# Patient Record
Sex: FEMALE | Race: WHITE | NOT HISPANIC OR LATINO | URBAN - NONMETROPOLITAN AREA
[De-identification: names, ages, dates, MRNs, and addresses within clinical notes are randomized per-mention and may not be internally consistent; named-entity substitution may affect disease eponyms.]

---
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
## 2019-01-07 ENCOUNTER — LAB REQUISITION (OUTPATIENT)
Dept: LAB | Facility: HOSPITAL | Age: 19
End: 2019-01-07

## 2019-01-07 DIAGNOSIS — G40.89 OTHER SEIZURES (HCC): ICD-10-CM

## 2019-01-07 LAB — PHENYTOIN SERPL-MCNC: 16.4 MCG/ML (ref 10–20)

## 2019-01-07 PROCEDURE — 80185 ASSAY OF PHENYTOIN TOTAL: CPT | Performed by: FAMILY MEDICINE

## 2019-02-26 ENCOUNTER — APPOINTMENT (OUTPATIENT)
Dept: CT IMAGING | Facility: HOSPITAL | Age: 19
End: 2019-02-26

## 2019-02-26 ENCOUNTER — APPOINTMENT (OUTPATIENT)
Dept: GENERAL RADIOLOGY | Facility: HOSPITAL | Age: 19
End: 2019-02-26

## 2019-02-26 ENCOUNTER — HOSPITAL ENCOUNTER (INPATIENT)
Facility: HOSPITAL | Age: 19
LOS: 9 days | Discharge: ANOTHER HOSPITAL | End: 2019-03-07
Attending: FAMILY MEDICINE | Admitting: INTERNAL MEDICINE

## 2019-02-26 DIAGNOSIS — J96.01 ACUTE RESPIRATORY FAILURE WITH HYPOXIA (HCC): Primary | ICD-10-CM

## 2019-02-26 DIAGNOSIS — N30.00 ACUTE CYSTITIS WITHOUT HEMATURIA: ICD-10-CM

## 2019-02-26 DIAGNOSIS — J18.9 PNEUMONIA OF BOTH LUNGS DUE TO INFECTIOUS ORGANISM, UNSPECIFIED PART OF LUNG: ICD-10-CM

## 2019-02-26 DIAGNOSIS — A41.9 SEPSIS, DUE TO UNSPECIFIED ORGANISM: ICD-10-CM

## 2019-02-26 DIAGNOSIS — I95.9 HYPOTENSION, UNSPECIFIED HYPOTENSION TYPE: ICD-10-CM

## 2019-02-26 LAB
A-A DO2: >300 MMHG (ref 0–300)
A-A DO2: >300 MMHG (ref 0–300)
ALBUMIN SERPL-MCNC: 3.5 G/DL (ref 3.5–5)
ALBUMIN/GLOB SERPL: 2.3 G/DL (ref 1.5–2.5)
ALP SERPL-CCNC: 196 U/L (ref 35–104)
ALT SERPL W P-5'-P-CCNC: 4 U/L (ref 10–36)
ANION GAP SERPL CALCULATED.3IONS-SCNC: 14.4 MMOL/L (ref 3.6–11.2)
ARTERIAL PATENCY WRIST A: ABNORMAL
ARTERIAL PATENCY WRIST A: ABNORMAL
AST SERPL-CCNC: 88 U/L (ref 10–30)
ATMOSPHERIC PRESS: 735 MMHG
ATMOSPHERIC PRESS: 737 MMHG
BACTERIA UR QL AUTO: ABNORMAL /HPF
BASE EXCESS BLDA CALC-SCNC: -2.2 MMOL/L
BASE EXCESS BLDA CALC-SCNC: -6 MMOL/L
BASOPHILS # BLD AUTO: 0.04 10*3/MM3 (ref 0–0.3)
BASOPHILS NFR BLD AUTO: 0.3 % (ref 0–2)
BDY SITE: ABNORMAL
BDY SITE: ABNORMAL
BILIRUB SERPL-MCNC: 0.1 MG/DL (ref 0.2–1.8)
BILIRUB UR QL STRIP: ABNORMAL
BNP SERPL-MCNC: 5 PG/ML (ref 0–100)
BODY TEMPERATURE: 98.6 C
BODY TEMPERATURE: 98.6 C
BUN BLD-MCNC: 32 MG/DL (ref 7–21)
BUN/CREAT SERPL: 34 (ref 7–25)
CALCIUM SPEC-SCNC: 8.3 MG/DL (ref 7.7–10)
CHLORIDE SERPL-SCNC: 92 MMOL/L (ref 99–112)
CK SERPL-CCNC: 37 U/L (ref 24–173)
CLARITY UR: ABNORMAL
CO2 SERPL-SCNC: 22.6 MMOL/L (ref 24.3–31.9)
COHGB MFR BLD: 0.6 % (ref 0–5)
COHGB MFR BLD: 1 % (ref 0–5)
COLOR UR: ABNORMAL
CREAT BLD-MCNC: 0.94 MG/DL (ref 0.43–1.29)
CREAT UR-MCNC: 83.6 MG/DL
CRP SERPL-MCNC: 24.9 MG/DL (ref 0–0.99)
D-LACTATE SERPL-SCNC: 0.6 MMOL/L (ref 0.5–2)
DACRYOCYTES BLD QL SMEAR: NORMAL
DEPRECATED RDW RBC AUTO: 44.1 FL (ref 37–54)
EOSINOPHIL # BLD AUTO: 0.01 10*3/MM3 (ref 0–0.7)
EOSINOPHIL NFR BLD AUTO: 0.1 % (ref 0–5)
ERYTHROCYTE [DISTWIDTH] IN BLOOD BY AUTOMATED COUNT: 12.7 % (ref 11.5–14.5)
FLUAV AG NPH QL: POSITIVE
FLUBV AG NPH QL IA: NEGATIVE
GFR SERPL CREATININE-BSD FRML MDRD: 76 ML/MIN/1.73
GLOBULIN UR ELPH-MCNC: 1.5 GM/DL
GLUCOSE BLD-MCNC: 152 MG/DL (ref 70–110)
GLUCOSE UR STRIP-MCNC: NEGATIVE MG/DL
HCO3 BLDA-SCNC: 19.9 MMOL/L (ref 22–26)
HCO3 BLDA-SCNC: 22.4 MMOL/L (ref 22–26)
HCT VFR BLD AUTO: 43.7 % (ref 37–47)
HCT VFR BLD CALC: 41 % (ref 37–47)
HCT VFR BLD CALC: 43 % (ref 37–47)
HGB BLD-MCNC: 15.4 G/DL (ref 12–16)
HGB BLDA-MCNC: 14 G/DL (ref 12–16)
HGB BLDA-MCNC: 14.6 G/DL (ref 12–16)
HGB UR QL STRIP.AUTO: ABNORMAL
HOROWITZ INDEX BLD+IHG-RTO: 100 %
HOROWITZ INDEX BLD+IHG-RTO: 100 %
HYALINE CASTS UR QL AUTO: ABNORMAL /LPF
IMM GRANULOCYTES # BLD AUTO: 0.2 10*3/MM3 (ref 0–0.03)
IMM GRANULOCYTES NFR BLD AUTO: 1.3 % (ref 0–0.5)
KETONES UR QL STRIP: ABNORMAL
LARGE PLATELETS: NORMAL
LEUKOCYTE ESTERASE UR QL STRIP.AUTO: ABNORMAL
LYMPHOCYTES # BLD AUTO: 1.66 10*3/MM3 (ref 1–3)
LYMPHOCYTES NFR BLD AUTO: 10.6 % (ref 21–51)
MACROCYTES BLD QL SMEAR: NORMAL
MAGNESIUM SERPL-MCNC: 2.5 MG/DL (ref 1.6–2.2)
MCH RBC QN AUTO: 34.1 PG (ref 27–33)
MCHC RBC AUTO-ENTMCNC: 35.2 G/DL (ref 33–37)
MCV RBC AUTO: 96.7 FL (ref 80–94)
METHGB BLD QL: 0.4 % (ref 0–3)
METHGB BLD QL: 0.5 % (ref 0–3)
MODALITY: ABNORMAL
MODALITY: ABNORMAL
MONOCYTES # BLD AUTO: 3.88 10*3/MM3 (ref 0.1–0.9)
MONOCYTES NFR BLD AUTO: 24.8 % (ref 0–10)
MRSA DNA SPEC QL NAA+PROBE: NEGATIVE
NEUTROPHILS # BLD AUTO: 9.86 10*3/MM3 (ref 1.4–6.5)
NEUTROPHILS NFR BLD AUTO: 62.9 % (ref 30–70)
NITRITE UR QL STRIP: NEGATIVE
NRBC BLD AUTO-RTO: 0 /100 WBC (ref 0–0)
OSMOLALITY SERPL CALC.SUM OF ELEC: 268.8 MOSM/KG (ref 273–305)
OXYHGB MFR BLDV: 92.8 % (ref 85–100)
OXYHGB MFR BLDV: 97.3 % (ref 85–100)
PCO2 BLDA: 38.2 MM HG (ref 35–45)
PCO2 BLDA: 40.9 MM HG (ref 35–45)
PEEP RESPIRATORY: 3 CM[H2O]
PEEP RESPIRATORY: 3 CM[H2O]
PH BLDA: 7.31 PH UNITS (ref 7.35–7.45)
PH BLDA: 7.39 PH UNITS (ref 7.35–7.45)
PH UR STRIP.AUTO: <=5 [PH] (ref 5–8)
PHENYTOIN SERPL-MCNC: 27.4 MCG/ML (ref 10–20)
PLATELET # BLD AUTO: 192 10*3/MM3 (ref 130–400)
PMV BLD AUTO: 13.1 FL (ref 6–10)
PO2 BLDA: 128.4 MM HG (ref 80–100)
PO2 BLDA: 74.2 MM HG (ref 80–100)
POTASSIUM BLD-SCNC: 4.5 MMOL/L (ref 3.5–5.3)
PROT SERPL-MCNC: 5 G/DL (ref 6–8)
PROT UR QL STRIP: ABNORMAL
RBC # BLD AUTO: 4.52 10*6/MM3 (ref 4.2–5.4)
RBC # UR: ABNORMAL /HPF
REF LAB TEST METHOD: ABNORMAL
S AUREUS DNA SPEC QL NAA+PROBE: NEGATIVE
SAO2 % BLDCOA: 94.1 % (ref 90–100)
SAO2 % BLDCOA: 98.4 % (ref 90–100)
SET MECH RESP RATE: 18
SET MECH RESP RATE: 20
SODIUM BLD-SCNC: 129 MMOL/L (ref 135–153)
SODIUM UR-SCNC: <10 MMOL/L
SP GR UR STRIP: >=1.03 (ref 1–1.03)
SQUAMOUS #/AREA URNS HPF: ABNORMAL /HPF
T3FREE SERPL-MCNC: 1.8 PG/ML (ref 2.3–4.2)
T4 FREE SERPL-MCNC: 0.66 NG/DL (ref 0.89–1.76)
TROPONIN I SERPL-MCNC: 0.01 NG/ML
TSH SERPL DL<=0.05 MIU/L-ACNC: 0.43 MIU/ML (ref 0.55–4.78)
UROBILINOGEN UR QL STRIP: ABNORMAL
VENTILATOR MODE: ABNORMAL
VENTILATOR MODE: ABNORMAL
VT ON VENT VENT: 350 ML
VT ON VENT VENT: <300 ML
WBC NRBC COR # BLD: 15.65 10*3/MM3 (ref 4.5–12.5)
WBC UR QL AUTO: ABNORMAL /HPF

## 2019-02-26 PROCEDURE — 87147 CULTURE TYPE IMMUNOLOGIC: CPT | Performed by: FAMILY MEDICINE

## 2019-02-26 PROCEDURE — 83605 ASSAY OF LACTIC ACID: CPT | Performed by: FAMILY MEDICINE

## 2019-02-26 PROCEDURE — 94799 UNLISTED PULMONARY SVC/PX: CPT

## 2019-02-26 PROCEDURE — 82805 BLOOD GASES W/O2 SATURATION: CPT | Performed by: NURSE PRACTITIONER

## 2019-02-26 PROCEDURE — 84439 ASSAY OF FREE THYROXINE: CPT | Performed by: NURSE PRACTITIONER

## 2019-02-26 PROCEDURE — 85007 BL SMEAR W/DIFF WBC COUNT: CPT | Performed by: FAMILY MEDICINE

## 2019-02-26 PROCEDURE — 25010000002 MEROPENEM: Performed by: FAMILY MEDICINE

## 2019-02-26 PROCEDURE — 31500 INSERT EMERGENCY AIRWAY: CPT

## 2019-02-26 PROCEDURE — 81001 URINALYSIS AUTO W/SCOPE: CPT | Performed by: FAMILY MEDICINE

## 2019-02-26 PROCEDURE — 36600 WITHDRAWAL OF ARTERIAL BLOOD: CPT | Performed by: FAMILY MEDICINE

## 2019-02-26 PROCEDURE — 82550 ASSAY OF CK (CPK): CPT | Performed by: FAMILY MEDICINE

## 2019-02-26 PROCEDURE — 84443 ASSAY THYROID STIM HORMONE: CPT | Performed by: FAMILY MEDICINE

## 2019-02-26 PROCEDURE — 84300 ASSAY OF URINE SODIUM: CPT | Performed by: NURSE PRACTITIONER

## 2019-02-26 PROCEDURE — 87640 STAPH A DNA AMP PROBE: CPT | Performed by: INTERNAL MEDICINE

## 2019-02-26 PROCEDURE — 80053 COMPREHEN METABOLIC PANEL: CPT | Performed by: FAMILY MEDICINE

## 2019-02-26 PROCEDURE — 83880 ASSAY OF NATRIURETIC PEPTIDE: CPT | Performed by: FAMILY MEDICINE

## 2019-02-26 PROCEDURE — 0BH17EZ INSERTION OF ENDOTRACHEAL AIRWAY INTO TRACHEA, VIA NATURAL OR ARTIFICIAL OPENING: ICD-10-PCS | Performed by: FAMILY MEDICINE

## 2019-02-26 PROCEDURE — 87186 SC STD MICRODIL/AGAR DIL: CPT | Performed by: FAMILY MEDICINE

## 2019-02-26 PROCEDURE — 5A1955Z RESPIRATORY VENTILATION, GREATER THAN 96 CONSECUTIVE HOURS: ICD-10-PCS | Performed by: HOSPITALIST

## 2019-02-26 PROCEDURE — 93010 ELECTROCARDIOGRAM REPORT: CPT | Performed by: INTERNAL MEDICINE

## 2019-02-26 PROCEDURE — 83050 HGB METHEMOGLOBIN QUAN: CPT | Performed by: FAMILY MEDICINE

## 2019-02-26 PROCEDURE — 99291 CRITICAL CARE FIRST HOUR: CPT | Performed by: INTERNAL MEDICINE

## 2019-02-26 PROCEDURE — 84481 FREE ASSAY (FT-3): CPT | Performed by: NURSE PRACTITIONER

## 2019-02-26 PROCEDURE — 25010000002 MIDAZOLAM PER 1 MG

## 2019-02-26 PROCEDURE — 82805 BLOOD GASES W/O2 SATURATION: CPT | Performed by: FAMILY MEDICINE

## 2019-02-26 PROCEDURE — 87150 DNA/RNA AMPLIFIED PROBE: CPT | Performed by: FAMILY MEDICINE

## 2019-02-26 PROCEDURE — 85025 COMPLETE CBC W/AUTO DIFF WBC: CPT | Performed by: FAMILY MEDICINE

## 2019-02-26 PROCEDURE — 83735 ASSAY OF MAGNESIUM: CPT | Performed by: FAMILY MEDICINE

## 2019-02-26 PROCEDURE — 71045 X-RAY EXAM CHEST 1 VIEW: CPT

## 2019-02-26 PROCEDURE — 36600 WITHDRAWAL OF ARTERIAL BLOOD: CPT | Performed by: NURSE PRACTITIONER

## 2019-02-26 PROCEDURE — 82375 ASSAY CARBOXYHB QUANT: CPT | Performed by: FAMILY MEDICINE

## 2019-02-26 PROCEDURE — 83050 HGB METHEMOGLOBIN QUAN: CPT | Performed by: NURSE PRACTITIONER

## 2019-02-26 PROCEDURE — 93005 ELECTROCARDIOGRAM TRACING: CPT | Performed by: FAMILY MEDICINE

## 2019-02-26 PROCEDURE — 82570 ASSAY OF URINE CREATININE: CPT | Performed by: NURSE PRACTITIONER

## 2019-02-26 PROCEDURE — 99285 EMERGENCY DEPT VISIT HI MDM: CPT

## 2019-02-26 PROCEDURE — 82375 ASSAY CARBOXYHB QUANT: CPT | Performed by: NURSE PRACTITIONER

## 2019-02-26 PROCEDURE — 87040 BLOOD CULTURE FOR BACTERIA: CPT | Performed by: FAMILY MEDICINE

## 2019-02-26 PROCEDURE — 87086 URINE CULTURE/COLONY COUNT: CPT | Performed by: FAMILY MEDICINE

## 2019-02-26 PROCEDURE — 25010000002 HEPARIN (PORCINE) PER 1000 UNITS: Performed by: INTERNAL MEDICINE

## 2019-02-26 PROCEDURE — 86140 C-REACTIVE PROTEIN: CPT | Performed by: FAMILY MEDICINE

## 2019-02-26 PROCEDURE — 80185 ASSAY OF PHENYTOIN TOTAL: CPT | Performed by: INTERNAL MEDICINE

## 2019-02-26 PROCEDURE — 87077 CULTURE AEROBIC IDENTIFY: CPT | Performed by: FAMILY MEDICINE

## 2019-02-26 PROCEDURE — 94002 VENT MGMT INPAT INIT DAY: CPT

## 2019-02-26 PROCEDURE — 84484 ASSAY OF TROPONIN QUANT: CPT | Performed by: FAMILY MEDICINE

## 2019-02-26 PROCEDURE — 87804 INFLUENZA ASSAY W/OPTIC: CPT | Performed by: FAMILY MEDICINE

## 2019-02-26 PROCEDURE — 25010000002 LINEZOLID 600 MG/300ML SOLUTION: Performed by: FAMILY MEDICINE

## 2019-02-26 PROCEDURE — 71045 X-RAY EXAM CHEST 1 VIEW: CPT | Performed by: RADIOLOGY

## 2019-02-26 PROCEDURE — 87641 MR-STAPH DNA AMP PROBE: CPT | Performed by: INTERNAL MEDICINE

## 2019-02-26 RX ORDER — MELATONIN
1000 4 TIMES DAILY
COMMUNITY
End: 2019-03-07 | Stop reason: HOSPADM

## 2019-02-26 RX ORDER — LINEZOLID 2 MG/ML
600 INJECTION, SOLUTION INTRAVENOUS ONCE
Status: COMPLETED | OUTPATIENT
Start: 2019-02-26 | End: 2019-02-26

## 2019-02-26 RX ORDER — SODIUM CHLORIDE 9 MG/ML
75 INJECTION, SOLUTION INTRAVENOUS CONTINUOUS
Status: DISCONTINUED | OUTPATIENT
Start: 2019-02-26 | End: 2019-02-27

## 2019-02-26 RX ORDER — BACLOFEN 20 MG/1
30 TABLET ORAL 3 TIMES DAILY
COMMUNITY
End: 2019-03-07 | Stop reason: HOSPADM

## 2019-02-26 RX ORDER — MIDAZOLAM IN 0.9 % SOD.CHLORID 1 MG/ML
PLASTIC BAG, INJECTION (ML) INTRAVENOUS
Status: DISPENSED
Start: 2019-02-26 | End: 2019-02-27

## 2019-02-26 RX ORDER — PANTOPRAZOLE SODIUM 40 MG/10ML
40 INJECTION, POWDER, LYOPHILIZED, FOR SOLUTION INTRAVENOUS
Status: DISCONTINUED | OUTPATIENT
Start: 2019-02-27 | End: 2019-02-27

## 2019-02-26 RX ORDER — PHENYTOIN 50 MG/1
125 TABLET, CHEWABLE ORAL 3 TIMES DAILY
Status: DISCONTINUED | OUTPATIENT
Start: 2019-02-26 | End: 2019-02-26

## 2019-02-26 RX ORDER — LACOSAMIDE 50 MG/1
200 TABLET ORAL 3 TIMES DAILY
Status: DISCONTINUED | OUTPATIENT
Start: 2019-02-26 | End: 2019-03-07 | Stop reason: HOSPADM

## 2019-02-26 RX ORDER — CETIRIZINE HYDROCHLORIDE 10 MG/1
10 TABLET ORAL NIGHTLY
COMMUNITY
End: 2019-03-07 | Stop reason: HOSPADM

## 2019-02-26 RX ORDER — OSELTAMIVIR PHOSPHATE 30 MG/1
30 CAPSULE ORAL EVERY 12 HOURS SCHEDULED
Status: DISCONTINUED | OUTPATIENT
Start: 2019-02-26 | End: 2019-02-27

## 2019-02-26 RX ORDER — VIGABATRIN 500 MG/1
1000 POWDER, FOR SOLUTION ORAL 3 TIMES DAILY
COMMUNITY

## 2019-02-26 RX ORDER — PANTOPRAZOLE SODIUM 40 MG/1
40 TABLET, DELAYED RELEASE ORAL NIGHTLY
Status: DISCONTINUED | OUTPATIENT
Start: 2019-02-26 | End: 2019-02-26

## 2019-02-26 RX ORDER — HEPARIN SODIUM 5000 [USP'U]/ML
5000 INJECTION, SOLUTION INTRAVENOUS; SUBCUTANEOUS EVERY 12 HOURS SCHEDULED
Status: DISCONTINUED | OUTPATIENT
Start: 2019-02-26 | End: 2019-02-27

## 2019-02-26 RX ORDER — CLONAZEPAM 2 MG/1
2 TABLET ORAL 2 TIMES DAILY PRN
COMMUNITY

## 2019-02-26 RX ORDER — TAURINE 500 MG
500 CAPSULE ORAL 2 TIMES DAILY
COMMUNITY
End: 2019-03-07 | Stop reason: HOSPADM

## 2019-02-26 RX ORDER — ACETAMINOPHEN 650 MG/1
SUPPOSITORY RECTAL
Status: COMPLETED
Start: 2019-02-26 | End: 2019-02-26

## 2019-02-26 RX ORDER — POTASSIUM CHLORIDE 20MEQ/15ML
7.5 LIQUID (ML) ORAL DAILY
COMMUNITY

## 2019-02-26 RX ORDER — ACETAMINOPHEN 650 MG/1
650 SUPPOSITORY RECTAL ONCE
Status: COMPLETED | OUTPATIENT
Start: 2019-02-26 | End: 2019-02-26

## 2019-02-26 RX ORDER — SODIUM CHLORIDE 0.9 % (FLUSH) 0.9 %
10 SYRINGE (ML) INJECTION AS NEEDED
Status: DISCONTINUED | OUTPATIENT
Start: 2019-02-26 | End: 2019-03-07 | Stop reason: HOSPADM

## 2019-02-26 RX ORDER — PANTOPRAZOLE SODIUM 40 MG/1
40 TABLET, DELAYED RELEASE ORAL NIGHTLY
COMMUNITY
End: 2019-03-07 | Stop reason: HOSPADM

## 2019-02-26 RX ORDER — SODIUM CHLORIDE 0.9 % (FLUSH) 0.9 %
3-10 SYRINGE (ML) INJECTION AS NEEDED
Status: DISCONTINUED | OUTPATIENT
Start: 2019-02-26 | End: 2019-03-07 | Stop reason: HOSPADM

## 2019-02-26 RX ORDER — ACETAMINOPHEN 160 MG/5ML
650 SOLUTION ORAL EVERY 6 HOURS PRN
Status: DISCONTINUED | OUTPATIENT
Start: 2019-02-26 | End: 2019-03-07 | Stop reason: HOSPADM

## 2019-02-26 RX ORDER — FUROSEMIDE 20 MG/1
20 TABLET ORAL DAILY
COMMUNITY
End: 2019-03-07 | Stop reason: HOSPADM

## 2019-02-26 RX ORDER — ETOMIDATE 2 MG/ML
20 INJECTION INTRAVENOUS ONCE
Status: COMPLETED | OUTPATIENT
Start: 2019-02-26 | End: 2019-02-26

## 2019-02-26 RX ORDER — MIDAZOLAM HYDROCHLORIDE 1 MG/ML
INJECTION INTRAMUSCULAR; INTRAVENOUS
Status: DISPENSED
Start: 2019-02-26 | End: 2019-02-27

## 2019-02-26 RX ORDER — LINEZOLID 2 MG/ML
600 INJECTION, SOLUTION INTRAVENOUS EVERY 12 HOURS
Status: DISCONTINUED | OUTPATIENT
Start: 2019-02-27 | End: 2019-02-28

## 2019-02-26 RX ORDER — MONTELUKAST SODIUM 5 MG/1
5 TABLET, CHEWABLE ORAL NIGHTLY
COMMUNITY
End: 2019-03-07 | Stop reason: HOSPADM

## 2019-02-26 RX ORDER — DIPHENHYDRAMINE HYDROCHLORIDE 50 MG/ML
25 INJECTION INTRAMUSCULAR; INTRAVENOUS ONCE
Status: DISCONTINUED | OUTPATIENT
Start: 2019-02-26 | End: 2019-02-26

## 2019-02-26 RX ORDER — SODIUM CHLORIDE 0.9 % (FLUSH) 0.9 %
3 SYRINGE (ML) INJECTION EVERY 12 HOURS SCHEDULED
Status: DISCONTINUED | OUTPATIENT
Start: 2019-02-26 | End: 2019-03-07 | Stop reason: HOSPADM

## 2019-02-26 RX ORDER — LACOSAMIDE 100 MG/1
200 TABLET ORAL 3 TIMES DAILY
COMMUNITY

## 2019-02-26 RX ORDER — MIDAZOLAM HYDROCHLORIDE 1 MG/ML
INJECTION INTRAMUSCULAR; INTRAVENOUS
Status: COMPLETED
Start: 2019-02-26 | End: 2019-02-26

## 2019-02-26 RX ORDER — PHENYTOIN 50 MG/1
125 TABLET, CHEWABLE ORAL 3 TIMES DAILY
COMMUNITY

## 2019-02-26 RX ADMIN — NOREPINEPHRINE BITARTRATE 0.02 MCG/KG/MIN: 1 INJECTION INTRAVENOUS at 15:29

## 2019-02-26 RX ADMIN — OSELTAMIVIR PHOSPHATE 30 MG: 30 CAPSULE ORAL at 22:23

## 2019-02-26 RX ADMIN — MEROPENEM 1 G: 1 INJECTION, POWDER, FOR SOLUTION INTRAVENOUS at 14:50

## 2019-02-26 RX ADMIN — SODIUM CHLORIDE 1000 ML: 9 INJECTION, SOLUTION INTRAVENOUS at 14:11

## 2019-02-26 RX ADMIN — MIDAZOLAM 2 MG: 1 INJECTION INTRAMUSCULAR; INTRAVENOUS at 13:34

## 2019-02-26 RX ADMIN — LACOSAMIDE 200 MG: 50 TABLET, FILM COATED ORAL at 22:24

## 2019-02-26 RX ADMIN — ACETAMINOPHEN 650 MG: 650 SUPPOSITORY RECTAL at 14:09

## 2019-02-26 RX ADMIN — ETOMIDATE 20 MG: 2 INJECTION, SOLUTION INTRAVENOUS at 13:56

## 2019-02-26 RX ADMIN — ACETAMINOPHEN 649.6 MG: 160 SOLUTION ORAL at 22:24

## 2019-02-26 RX ADMIN — Medication: at 23:14

## 2019-02-26 RX ADMIN — SODIUM CHLORIDE, PRESERVATIVE FREE 3 ML: 5 INJECTION INTRAVENOUS at 22:22

## 2019-02-26 RX ADMIN — HEPARIN SODIUM 5000 UNITS: 5000 INJECTION INTRAVENOUS; SUBCUTANEOUS at 22:23

## 2019-02-26 RX ADMIN — SODIUM CHLORIDE 1000 ML: 9 INJECTION, SOLUTION INTRAVENOUS at 23:59

## 2019-02-26 RX ADMIN — LINEZOLID 600 MG: 600 INJECTION, SOLUTION INTRAVENOUS at 16:08

## 2019-02-26 RX ADMIN — SODIUM CHLORIDE 1000 ML: 9 INJECTION, SOLUTION INTRAVENOUS at 16:00

## 2019-02-26 RX ADMIN — SODIUM CHLORIDE 75 ML/HR: 9 INJECTION, SOLUTION INTRAVENOUS at 17:37

## 2019-02-27 ENCOUNTER — APPOINTMENT (OUTPATIENT)
Dept: GENERAL RADIOLOGY | Facility: HOSPITAL | Age: 19
End: 2019-02-27

## 2019-02-27 ENCOUNTER — APPOINTMENT (OUTPATIENT)
Dept: INFUSION THERAPY | Facility: HOSPITAL | Age: 19
End: 2019-02-27

## 2019-02-27 ENCOUNTER — APPOINTMENT (OUTPATIENT)
Dept: CT IMAGING | Facility: HOSPITAL | Age: 19
End: 2019-02-27

## 2019-02-27 LAB
A-A DO2: >300 MMHG (ref 0–300)
ACETONE BLD QL: ABNORMAL
ANION GAP SERPL CALCULATED.3IONS-SCNC: 11.1 MMOL/L (ref 3.6–11.2)
ARTERIAL PATENCY WRIST A: POSITIVE
ATMOSPHERIC PRESS: 731 MMHG
BASE EXCESS BLDA CALC-SCNC: -1.7 MMOL/L
BDY SITE: ABNORMAL
BODY TEMPERATURE: 98.9 C
BUN BLD-MCNC: <5 MG/DL (ref 7–21)
BUN/CREAT SERPL: ABNORMAL (ref 7–25)
CALCIUM SPEC-SCNC: 6.6 MG/DL (ref 7.7–10)
CHLORIDE SERPL-SCNC: 112 MMOL/L (ref 99–112)
CO2 SERPL-SCNC: 23.9 MMOL/L (ref 24.3–31.9)
COHGB MFR BLD: 0.6 % (ref 0–5)
CREAT BLD-MCNC: 0.23 MG/DL (ref 0.43–1.29)
DEPRECATED RDW RBC AUTO: 42.9 FL (ref 37–54)
ERYTHROCYTE [DISTWIDTH] IN BLOOD BY AUTOMATED COUNT: 12.6 % (ref 11.5–14.5)
GFR SERPL CREATININE-BSD FRML MDRD: >150 ML/MIN/1.73
GLUCOSE BLD-MCNC: 201 MG/DL (ref 70–110)
HCO3 BLDA-SCNC: 21.8 MMOL/L (ref 22–26)
HCT VFR BLD AUTO: 42.2 % (ref 37–47)
HCT VFR BLD CALC: 41 % (ref 37–47)
HGB BLD-MCNC: 14.2 G/DL (ref 12–16)
HGB BLDA-MCNC: 13.8 G/DL (ref 12–16)
HOROWITZ INDEX BLD+IHG-RTO: 80 %
MCH RBC QN AUTO: 32.4 PG (ref 27–33)
MCHC RBC AUTO-ENTMCNC: 33.6 G/DL (ref 33–37)
MCV RBC AUTO: 96.3 FL (ref 80–94)
METHGB BLD QL: 0.2 % (ref 0–3)
MODALITY: ABNORMAL
OSMOLALITY SERPL CALC.SUM OF ELEC: NORMAL MOSM/KG (ref 273–305)
OXYHGB MFR BLDV: 96.8 % (ref 85–100)
PCO2 BLDA: 33.4 MM HG (ref 35–45)
PEEP RESPIRATORY: 8 CM[H2O]
PH BLDA: 7.43 PH UNITS (ref 7.35–7.45)
PHENYTOIN SERPL-MCNC: 24.1 MCG/ML (ref 10–20)
PLATELET # BLD AUTO: 145 10*3/MM3 (ref 130–400)
PMV BLD AUTO: 12 FL (ref 6–10)
PO2 BLDA: 96.7 MM HG (ref 80–100)
POTASSIUM BLD-SCNC: 3.3 MMOL/L (ref 3.5–5.3)
RBC # BLD AUTO: 4.38 10*6/MM3 (ref 4.2–5.4)
SAO2 % BLDCOA: 97.6 % (ref 90–100)
SET MECH RESP RATE: 18
SODIUM BLD-SCNC: 147 MMOL/L (ref 135–153)
VENTILATOR MODE: ABNORMAL
VT ON VENT VENT: 330 ML
WBC NRBC COR # BLD: 12.79 10*3/MM3 (ref 4.5–12.5)

## 2019-02-27 PROCEDURE — 74022 RADEX COMPL AQT ABD SERIES: CPT | Performed by: RADIOLOGY

## 2019-02-27 PROCEDURE — 94003 VENT MGMT INPAT SUBQ DAY: CPT

## 2019-02-27 PROCEDURE — 25010000002 MEROPENEM

## 2019-02-27 PROCEDURE — 94799 UNLISTED PULMONARY SVC/PX: CPT

## 2019-02-27 PROCEDURE — 25010000002 HEPARIN (PORCINE) PER 1000 UNITS: Performed by: INTERNAL MEDICINE

## 2019-02-27 PROCEDURE — 25010000002 MEROPENEM: Performed by: INTERNAL MEDICINE

## 2019-02-27 PROCEDURE — 36600 WITHDRAWAL OF ARTERIAL BLOOD: CPT | Performed by: PHYSICIAN ASSISTANT

## 2019-02-27 PROCEDURE — 82805 BLOOD GASES W/O2 SATURATION: CPT | Performed by: PHYSICIAN ASSISTANT

## 2019-02-27 PROCEDURE — 82375 ASSAY CARBOXYHB QUANT: CPT | Performed by: PHYSICIAN ASSISTANT

## 2019-02-27 PROCEDURE — 74176 CT ABD & PELVIS W/O CONTRAST: CPT | Performed by: RADIOLOGY

## 2019-02-27 PROCEDURE — 80048 BASIC METABOLIC PNL TOTAL CA: CPT | Performed by: INTERNAL MEDICINE

## 2019-02-27 PROCEDURE — 74176 CT ABD & PELVIS W/O CONTRAST: CPT

## 2019-02-27 PROCEDURE — 25010000002 LINEZOLID 600 MG/300ML SOLUTION: Performed by: INTERNAL MEDICINE

## 2019-02-27 PROCEDURE — 85027 COMPLETE CBC AUTOMATED: CPT | Performed by: INTERNAL MEDICINE

## 2019-02-27 PROCEDURE — 82009 KETONE BODYS QUAL: CPT | Performed by: PHYSICIAN ASSISTANT

## 2019-02-27 PROCEDURE — 70450 CT HEAD/BRAIN W/O DYE: CPT

## 2019-02-27 PROCEDURE — 25010000002 LORAZEPAM PER 2 MG

## 2019-02-27 PROCEDURE — 70450 CT HEAD/BRAIN W/O DYE: CPT | Performed by: RADIOLOGY

## 2019-02-27 PROCEDURE — 80185 ASSAY OF PHENYTOIN TOTAL: CPT | Performed by: INTERNAL MEDICINE

## 2019-02-27 PROCEDURE — 99291 CRITICAL CARE FIRST HOUR: CPT | Performed by: INTERNAL MEDICINE

## 2019-02-27 PROCEDURE — 99232 SBSQ HOSP IP/OBS MODERATE 35: CPT | Performed by: INTERNAL MEDICINE

## 2019-02-27 PROCEDURE — 94002 VENT MGMT INPAT INIT DAY: CPT

## 2019-02-27 PROCEDURE — 25010000002 SUCCINYLCHOLINE PER 20 MG

## 2019-02-27 PROCEDURE — 83050 HGB METHEMOGLOBIN QUAN: CPT | Performed by: PHYSICIAN ASSISTANT

## 2019-02-27 PROCEDURE — 25010000002 HYDROCORTISONE SODIUM SUCCINATE 100 MG RECONSTITUTED SOLUTION: Performed by: PHYSICIAN ASSISTANT

## 2019-02-27 PROCEDURE — 95822 EEG COMA OR SLEEP ONLY: CPT

## 2019-02-27 PROCEDURE — C1751 CATH, INF, PER/CENT/MIDLINE: HCPCS

## 2019-02-27 PROCEDURE — 25010000002 PROPOFOL 10 MG/ML EMULSION

## 2019-02-27 PROCEDURE — 74022 RADEX COMPL AQT ABD SERIES: CPT

## 2019-02-27 RX ORDER — IPRATROPIUM BROMIDE AND ALBUTEROL SULFATE 2.5; .5 MG/3ML; MG/3ML
3 SOLUTION RESPIRATORY (INHALATION)
Status: DISCONTINUED | OUTPATIENT
Start: 2019-02-27 | End: 2019-03-07 | Stop reason: HOSPADM

## 2019-02-27 RX ORDER — SODIUM CHLORIDE 0.9 % (FLUSH) 0.9 %
10 SYRINGE (ML) INJECTION AS NEEDED
Status: DISCONTINUED | OUTPATIENT
Start: 2019-02-27 | End: 2019-03-07 | Stop reason: HOSPADM

## 2019-02-27 RX ORDER — LEVOTHYROXINE SODIUM ANHYDROUS 100 UG/5ML
50 INJECTION, POWDER, LYOPHILIZED, FOR SOLUTION INTRAVENOUS
Status: DISCONTINUED | OUTPATIENT
Start: 2019-02-27 | End: 2019-03-07 | Stop reason: HOSPADM

## 2019-02-27 RX ORDER — POTASSIUM CHLORIDE 1.5 G/1.77G
40 POWDER, FOR SOLUTION ORAL ONCE
Status: COMPLETED | OUTPATIENT
Start: 2019-02-27 | End: 2019-02-27

## 2019-02-27 RX ORDER — PANTOPRAZOLE SODIUM 40 MG/10ML
40 INJECTION, POWDER, LYOPHILIZED, FOR SOLUTION INTRAVENOUS EVERY 12 HOURS SCHEDULED
Status: DISCONTINUED | OUTPATIENT
Start: 2019-02-27 | End: 2019-03-04 | Stop reason: CLARIF

## 2019-02-27 RX ORDER — OSELTAMIVIR PHOSPHATE 75 MG/1
75 CAPSULE ORAL EVERY 12 HOURS SCHEDULED
Status: COMPLETED | OUTPATIENT
Start: 2019-02-27 | End: 2019-03-03

## 2019-02-27 RX ORDER — PROPOFOL 10 MG/ML
VIAL (ML) INTRAVENOUS
Status: COMPLETED
Start: 2019-02-27 | End: 2019-02-27

## 2019-02-27 RX ORDER — LORAZEPAM 2 MG/ML
1 INJECTION INTRAMUSCULAR ONCE
Status: COMPLETED | OUTPATIENT
Start: 2019-02-27 | End: 2019-02-27

## 2019-02-27 RX ORDER — SODIUM CHLORIDE 0.9 % (FLUSH) 0.9 %
10 SYRINGE (ML) INJECTION EVERY 12 HOURS SCHEDULED
Status: DISCONTINUED | OUTPATIENT
Start: 2019-02-27 | End: 2019-03-07 | Stop reason: HOSPADM

## 2019-02-27 RX ORDER — LORAZEPAM 2 MG/ML
INJECTION INTRAMUSCULAR
Status: COMPLETED
Start: 2019-02-27 | End: 2019-02-27

## 2019-02-27 RX ORDER — HEPARIN SODIUM 5000 [USP'U]/ML
5000 INJECTION, SOLUTION INTRAVENOUS; SUBCUTANEOUS EVERY 8 HOURS SCHEDULED
Status: DISCONTINUED | OUTPATIENT
Start: 2019-02-27 | End: 2019-03-07 | Stop reason: HOSPADM

## 2019-02-27 RX ORDER — SODIUM CHLORIDE 0.9 % (FLUSH) 0.9 %
20 SYRINGE (ML) INJECTION AS NEEDED
Status: DISCONTINUED | OUTPATIENT
Start: 2019-02-27 | End: 2019-03-07 | Stop reason: HOSPADM

## 2019-02-27 RX ADMIN — HYDROCORTISONE SODIUM SUCCINATE 50 MG: 100 INJECTION, POWDER, FOR SOLUTION INTRAMUSCULAR; INTRAVENOUS at 16:39

## 2019-02-27 RX ADMIN — LINEZOLID 600 MG: 600 INJECTION, SOLUTION INTRAVENOUS at 16:38

## 2019-02-27 RX ADMIN — SODIUM CHLORIDE, PRESERVATIVE FREE 10 ML: 5 INJECTION INTRAVENOUS at 09:02

## 2019-02-27 RX ADMIN — HYDROCORTISONE SODIUM SUCCINATE 50 MG: 100 INJECTION, POWDER, FOR SOLUTION INTRAMUSCULAR; INTRAVENOUS at 11:00

## 2019-02-27 RX ADMIN — MEROPENEM 1 G: 1 INJECTION, POWDER, FOR SOLUTION INTRAVENOUS at 11:09

## 2019-02-27 RX ADMIN — LORAZEPAM 1 MG: 2 INJECTION INTRAMUSCULAR; INTRAVENOUS at 09:40

## 2019-02-27 RX ADMIN — LORAZEPAM 1 MG: 2 INJECTION INTRAMUSCULAR at 09:40

## 2019-02-27 RX ADMIN — HEPARIN SODIUM 5000 UNITS: 5000 INJECTION INTRAVENOUS; SUBCUTANEOUS at 09:01

## 2019-02-27 RX ADMIN — PROPOFOL 5 MCG/KG/MIN: 10 INJECTION, EMULSION INTRAVENOUS at 09:24

## 2019-02-27 RX ADMIN — MEROPENEM 1 G: 1 INJECTION, POWDER, FOR SOLUTION INTRAVENOUS at 21:20

## 2019-02-27 RX ADMIN — IPRATROPIUM BROMIDE AND ALBUTEROL SULFATE 3 ML: .5; 3 SOLUTION RESPIRATORY (INHALATION) at 19:20

## 2019-02-27 RX ADMIN — SODIUM CHLORIDE, PRESERVATIVE FREE 3 ML: 5 INJECTION INTRAVENOUS at 21:39

## 2019-02-27 RX ADMIN — IPRATROPIUM BROMIDE AND ALBUTEROL SULFATE 3 ML: .5; 3 SOLUTION RESPIRATORY (INHALATION) at 06:52

## 2019-02-27 RX ADMIN — HYDROCORTISONE SODIUM SUCCINATE 50 MG: 100 INJECTION, POWDER, FOR SOLUTION INTRAMUSCULAR; INTRAVENOUS at 21:21

## 2019-02-27 RX ADMIN — SODIUM CHLORIDE, PRESERVATIVE FREE 10 ML: 5 INJECTION INTRAVENOUS at 21:39

## 2019-02-27 RX ADMIN — OSELTAMIVIR PHOSPHATE 75 MG: 75 CAPSULE ORAL at 09:25

## 2019-02-27 RX ADMIN — MEROPENEM 1 G: 1 INJECTION, POWDER, FOR SOLUTION INTRAVENOUS at 03:03

## 2019-02-27 RX ADMIN — SODIUM CHLORIDE 75 ML/HR: 9 INJECTION, SOLUTION INTRAVENOUS at 00:34

## 2019-02-27 RX ADMIN — SODIUM CHLORIDE, PRESERVATIVE FREE 3 ML: 5 INJECTION INTRAVENOUS at 09:02

## 2019-02-27 RX ADMIN — LEVOTHYROXINE SODIUM ANHYDROUS 50 MCG: 100 INJECTION, POWDER, LYOPHILIZED, FOR SOLUTION INTRAVENOUS at 11:00

## 2019-02-27 RX ADMIN — SODIUM CHLORIDE 75 ML/HR: 9 INJECTION, SOLUTION INTRAVENOUS at 09:24

## 2019-02-27 RX ADMIN — OSELTAMIVIR PHOSPHATE 75 MG: 75 CAPSULE ORAL at 21:22

## 2019-02-27 RX ADMIN — LINEZOLID 600 MG: 600 INJECTION, SOLUTION INTRAVENOUS at 04:45

## 2019-02-27 RX ADMIN — LACOSAMIDE 200 MG: 50 TABLET, FILM COATED ORAL at 21:22

## 2019-02-27 RX ADMIN — NOREPINEPHRINE BITARTRATE 0.26 MCG/KG/MIN: 1 INJECTION INTRAVENOUS at 19:05

## 2019-02-27 RX ADMIN — POTASSIUM CHLORIDE 40 MEQ: 1.5 POWDER, FOR SOLUTION ORAL at 21:45

## 2019-02-27 RX ADMIN — Medication: at 21:45

## 2019-02-27 RX ADMIN — LACOSAMIDE 200 MG: 50 TABLET, FILM COATED ORAL at 16:39

## 2019-02-27 RX ADMIN — PANTOPRAZOLE SODIUM 40 MG: 40 INJECTION, POWDER, FOR SOLUTION INTRAVENOUS at 05:00

## 2019-02-27 RX ADMIN — HEPARIN SODIUM 5000 UNITS: 5000 INJECTION INTRAVENOUS; SUBCUTANEOUS at 21:45

## 2019-02-27 RX ADMIN — NOREPINEPHRINE BITARTRATE 0.26 MCG/KG/MIN: 1 INJECTION INTRAVENOUS at 04:45

## 2019-02-27 RX ADMIN — LACOSAMIDE 200 MG: 50 TABLET, FILM COATED ORAL at 09:03

## 2019-02-27 RX ADMIN — IPRATROPIUM BROMIDE AND ALBUTEROL SULFATE 3 ML: .5; 3 SOLUTION RESPIRATORY (INHALATION) at 12:34

## 2019-02-27 RX ADMIN — PANTOPRAZOLE SODIUM 40 MG: 40 INJECTION, POWDER, FOR SOLUTION INTRAVENOUS at 21:22

## 2019-02-28 ENCOUNTER — APPOINTMENT (OUTPATIENT)
Dept: GENERAL RADIOLOGY | Facility: HOSPITAL | Age: 19
End: 2019-02-28

## 2019-02-28 LAB
A-A DO2: >300 MMHG (ref 0–300)
ARTERIAL PATENCY WRIST A: POSITIVE
ATMOSPHERIC PRESS: 731 MMHG
BACTERIA BLD CULT: ABNORMAL
BACTERIA SPEC AEROBE CULT: NO GROWTH
BASE EXCESS BLDA CALC-SCNC: -4.9 MMOL/L
BDY SITE: ABNORMAL
BODY TEMPERATURE: 98.6 C
COHGB MFR BLD: 0.5 % (ref 0–5)
HCO3 BLDA-SCNC: 20.8 MMOL/L (ref 22–26)
HCT VFR BLD CALC: 43 % (ref 37–47)
HGB BLDA-MCNC: 14.6 G/DL (ref 12–16)
HOROWITZ INDEX BLD+IHG-RTO: 70 %
MAGNESIUM SERPL-MCNC: 1.6 MG/DL (ref 1.6–2.2)
METHGB BLD QL: 0.3 % (ref 0–3)
MODALITY: ABNORMAL
OXYHGB MFR BLDV: 97.3 % (ref 85–100)
PCO2 BLDA: 41 MM HG (ref 35–45)
PEEP RESPIRATORY: 10 CM[H2O]
PH BLDA: 7.32 PH UNITS (ref 7.35–7.45)
PO2 BLDA: 113.4 MM HG (ref 80–100)
POTASSIUM BLD-SCNC: 2.6 MMOL/L (ref 3.5–5.3)
POTASSIUM BLD-SCNC: 4.5 MMOL/L (ref 3.5–5.3)
SAO2 % BLDCOA: 98.1 % (ref 90–100)
SET MECH RESP RATE: 18
VENTILATOR MODE: ABNORMAL
VT ON VENT VENT: 330 ML

## 2019-02-28 PROCEDURE — 87040 BLOOD CULTURE FOR BACTERIA: CPT | Performed by: INTERNAL MEDICINE

## 2019-02-28 PROCEDURE — 25010000002 HYDROCORTISONE SODIUM SUCCINATE 100 MG RECONSTITUTED SOLUTION: Performed by: PHYSICIAN ASSISTANT

## 2019-02-28 PROCEDURE — 83735 ASSAY OF MAGNESIUM: CPT | Performed by: INTERNAL MEDICINE

## 2019-02-28 PROCEDURE — 99291 CRITICAL CARE FIRST HOUR: CPT | Performed by: INTERNAL MEDICINE

## 2019-02-28 PROCEDURE — 94799 UNLISTED PULMONARY SVC/PX: CPT

## 2019-02-28 PROCEDURE — 36600 WITHDRAWAL OF ARTERIAL BLOOD: CPT | Performed by: PHYSICIAN ASSISTANT

## 2019-02-28 PROCEDURE — 25010000002 HEPARIN (PORCINE) PER 1000 UNITS: Performed by: INTERNAL MEDICINE

## 2019-02-28 PROCEDURE — 25010000002 LINEZOLID 600 MG/300ML SOLUTION: Performed by: INTERNAL MEDICINE

## 2019-02-28 PROCEDURE — 25010000002 MEROPENEM

## 2019-02-28 PROCEDURE — 25010000002 PROPOFOL 1000 MG/ML EMULSION: Performed by: PHYSICIAN ASSISTANT

## 2019-02-28 PROCEDURE — 82805 BLOOD GASES W/O2 SATURATION: CPT | Performed by: PHYSICIAN ASSISTANT

## 2019-02-28 PROCEDURE — 82375 ASSAY CARBOXYHB QUANT: CPT | Performed by: PHYSICIAN ASSISTANT

## 2019-02-28 PROCEDURE — 84132 ASSAY OF SERUM POTASSIUM: CPT | Performed by: INTERNAL MEDICINE

## 2019-02-28 PROCEDURE — 83050 HGB METHEMOGLOBIN QUAN: CPT | Performed by: PHYSICIAN ASSISTANT

## 2019-02-28 PROCEDURE — 94003 VENT MGMT INPAT SUBQ DAY: CPT

## 2019-02-28 PROCEDURE — 99233 SBSQ HOSP IP/OBS HIGH 50: CPT | Performed by: INTERNAL MEDICINE

## 2019-02-28 PROCEDURE — 71045 X-RAY EXAM CHEST 1 VIEW: CPT | Performed by: RADIOLOGY

## 2019-02-28 PROCEDURE — 25010000002 MAGNESIUM SULFATE 2 GM/50ML SOLUTION: Performed by: INTERNAL MEDICINE

## 2019-02-28 PROCEDURE — 25010000002 LORAZEPAM PER 2 MG

## 2019-02-28 PROCEDURE — 25010000002 AZITHROMYCIN: Performed by: INTERNAL MEDICINE

## 2019-02-28 PROCEDURE — 71045 X-RAY EXAM CHEST 1 VIEW: CPT

## 2019-02-28 RX ORDER — VIGABATRIN 500 MG/1
1000 POWDER, FOR SOLUTION ORAL 3 TIMES DAILY
Status: DISCONTINUED | OUTPATIENT
Start: 2019-02-28 | End: 2019-03-07 | Stop reason: HOSPADM

## 2019-02-28 RX ORDER — MAGNESIUM SULFATE HEPTAHYDRATE 40 MG/ML
2 INJECTION, SOLUTION INTRAVENOUS ONCE
Status: COMPLETED | OUTPATIENT
Start: 2019-02-28 | End: 2019-02-28

## 2019-02-28 RX ORDER — POTASSIUM CHLORIDE 20MEQ/15ML
40 LIQUID (ML) ORAL
Status: COMPLETED | OUTPATIENT
Start: 2019-02-28 | End: 2019-02-28

## 2019-02-28 RX ORDER — LORAZEPAM 2 MG/ML
INJECTION INTRAMUSCULAR
Status: COMPLETED
Start: 2019-02-28 | End: 2019-02-28

## 2019-02-28 RX ORDER — CLONAZEPAM 1 MG/1
2 TABLET ORAL 3 TIMES DAILY
Status: DISCONTINUED | OUTPATIENT
Start: 2019-02-28 | End: 2019-03-07 | Stop reason: HOSPADM

## 2019-02-28 RX ADMIN — PROPOFOL 30 MCG/KG/MIN: 10 INJECTION, EMULSION INTRAVENOUS at 01:11

## 2019-02-28 RX ADMIN — POTASSIUM CHLORIDE 40 MEQ: 20 SOLUTION ORAL at 10:26

## 2019-02-28 RX ADMIN — HEPARIN SODIUM 5000 UNITS: 5000 INJECTION INTRAVENOUS; SUBCUTANEOUS at 13:14

## 2019-02-28 RX ADMIN — HYDROCORTISONE SODIUM SUCCINATE 50 MG: 100 INJECTION, POWDER, FOR SOLUTION INTRAMUSCULAR; INTRAVENOUS at 05:07

## 2019-02-28 RX ADMIN — IPRATROPIUM BROMIDE AND ALBUTEROL SULFATE 3 ML: .5; 3 SOLUTION RESPIRATORY (INHALATION) at 01:15

## 2019-02-28 RX ADMIN — DOXYCYCLINE 100 MG: 100 INJECTION, POWDER, LYOPHILIZED, FOR SOLUTION INTRAVENOUS at 22:10

## 2019-02-28 RX ADMIN — IPRATROPIUM BROMIDE AND ALBUTEROL SULFATE 3 ML: .5; 3 SOLUTION RESPIRATORY (INHALATION) at 07:05

## 2019-02-28 RX ADMIN — IPRATROPIUM BROMIDE AND ALBUTEROL SULFATE 3 ML: .5; 3 SOLUTION RESPIRATORY (INHALATION) at 12:25

## 2019-02-28 RX ADMIN — PROPOFOL 35 MCG/KG/MIN: 10 INJECTION, EMULSION INTRAVENOUS at 12:26

## 2019-02-28 RX ADMIN — SODIUM CHLORIDE, PRESERVATIVE FREE 3 ML: 5 INJECTION INTRAVENOUS at 21:44

## 2019-02-28 RX ADMIN — LEVOTHYROXINE SODIUM ANHYDROUS 50 MCG: 100 INJECTION, POWDER, LYOPHILIZED, FOR SOLUTION INTRAVENOUS at 10:25

## 2019-02-28 RX ADMIN — VIGABATRIN 1000 MG: 500 POWDER, FOR SOLUTION ORAL at 18:54

## 2019-02-28 RX ADMIN — HYDROCORTISONE SODIUM SUCCINATE 50 MG: 100 INJECTION, POWDER, FOR SOLUTION INTRAMUSCULAR; INTRAVENOUS at 16:43

## 2019-02-28 RX ADMIN — CLONAZEPAM 2 MG: 1 TABLET ORAL at 21:44

## 2019-02-28 RX ADMIN — POTASSIUM CHLORIDE 40 MEQ: 20 SOLUTION ORAL at 13:00

## 2019-02-28 RX ADMIN — LACOSAMIDE 200 MG: 50 TABLET, FILM COATED ORAL at 14:42

## 2019-02-28 RX ADMIN — AZITHROMYCIN MONOHYDRATE 500 MG: 500 INJECTION, POWDER, LYOPHILIZED, FOR SOLUTION INTRAVENOUS at 09:20

## 2019-02-28 RX ADMIN — OSELTAMIVIR PHOSPHATE 75 MG: 75 CAPSULE ORAL at 21:43

## 2019-02-28 RX ADMIN — VIGABATRIN 1000 MG: 500 POWDER, FOR SOLUTION ORAL at 21:42

## 2019-02-28 RX ADMIN — LORAZEPAM 2 MG: 2 INJECTION INTRAMUSCULAR; INTRAVENOUS at 14:39

## 2019-02-28 RX ADMIN — HEPARIN SODIUM 5000 UNITS: 5000 INJECTION INTRAVENOUS; SUBCUTANEOUS at 05:08

## 2019-02-28 RX ADMIN — CLONAZEPAM 2 MG: 1 TABLET ORAL at 16:43

## 2019-02-28 RX ADMIN — DOXYCYCLINE 100 MG: 100 INJECTION, POWDER, LYOPHILIZED, FOR SOLUTION INTRAVENOUS at 10:25

## 2019-02-28 RX ADMIN — IPRATROPIUM BROMIDE AND ALBUTEROL SULFATE 3 ML: .5; 3 SOLUTION RESPIRATORY (INHALATION) at 19:00

## 2019-02-28 RX ADMIN — HYDROCORTISONE SODIUM SUCCINATE 50 MG: 100 INJECTION, POWDER, FOR SOLUTION INTRAMUSCULAR; INTRAVENOUS at 09:05

## 2019-02-28 RX ADMIN — LACOSAMIDE 200 MG: 50 TABLET, FILM COATED ORAL at 08:52

## 2019-02-28 RX ADMIN — SODIUM CHLORIDE, PRESERVATIVE FREE 10 ML: 5 INJECTION INTRAVENOUS at 08:52

## 2019-02-28 RX ADMIN — SODIUM CHLORIDE, PRESERVATIVE FREE 3 ML: 5 INJECTION INTRAVENOUS at 08:53

## 2019-02-28 RX ADMIN — MEROPENEM 1 G: 1 INJECTION, POWDER, FOR SOLUTION INTRAVENOUS at 05:07

## 2019-02-28 RX ADMIN — MAGNESIUM SULFATE IN WATER 2 G: 40 INJECTION, SOLUTION INTRAVENOUS at 16:44

## 2019-02-28 RX ADMIN — OSELTAMIVIR PHOSPHATE 75 MG: 75 CAPSULE ORAL at 08:53

## 2019-02-28 RX ADMIN — Medication: at 17:12

## 2019-02-28 RX ADMIN — SODIUM CHLORIDE, PRESERVATIVE FREE 10 ML: 5 INJECTION INTRAVENOUS at 21:44

## 2019-02-28 RX ADMIN — HYDROCORTISONE SODIUM SUCCINATE 50 MG: 100 INJECTION, POWDER, FOR SOLUTION INTRAMUSCULAR; INTRAVENOUS at 21:43

## 2019-02-28 RX ADMIN — LINEZOLID 600 MG: 600 INJECTION, SOLUTION INTRAVENOUS at 05:08

## 2019-02-28 RX ADMIN — PANTOPRAZOLE SODIUM 40 MG: 40 INJECTION, POWDER, FOR SOLUTION INTRAVENOUS at 08:52

## 2019-02-28 RX ADMIN — LACOSAMIDE 200 MG: 50 TABLET, FILM COATED ORAL at 21:43

## 2019-02-28 RX ADMIN — PANTOPRAZOLE SODIUM 40 MG: 40 INJECTION, POWDER, FOR SOLUTION INTRAVENOUS at 21:43

## 2019-02-28 RX ADMIN — HEPARIN SODIUM 5000 UNITS: 5000 INJECTION INTRAVENOUS; SUBCUTANEOUS at 21:42

## 2019-03-01 ENCOUNTER — APPOINTMENT (OUTPATIENT)
Dept: GENERAL RADIOLOGY | Facility: HOSPITAL | Age: 19
End: 2019-03-01

## 2019-03-01 LAB
A-A DO2: 246.9 MMHG (ref 0–300)
A-A DO2: 250.6 MMHG (ref 0–300)
ANION GAP SERPL CALCULATED.3IONS-SCNC: 13.8 MMOL/L (ref 3.6–11.2)
ARTERIAL PATENCY WRIST A: POSITIVE
ARTERIAL PATENCY WRIST A: POSITIVE
ATMOSPHERIC PRESS: 727 MMHG
ATMOSPHERIC PRESS: 729 MMHG
BASE EXCESS BLDA CALC-SCNC: -2.9 MMOL/L
BASE EXCESS BLDA CALC-SCNC: 0.8 MMOL/L
BASOPHILS # BLD AUTO: 0.01 10*3/MM3 (ref 0–0.3)
BASOPHILS NFR BLD AUTO: 0.1 % (ref 0–2)
BDY SITE: ABNORMAL
BDY SITE: NORMAL
BODY TEMPERATURE: 98.6 C
BODY TEMPERATURE: 98.6 C
BUN BLD-MCNC: 7 MG/DL (ref 7–21)
BUN/CREAT SERPL: 18.9 (ref 7–25)
CALCIUM SPEC-SCNC: 7.7 MG/DL (ref 7.7–10)
CHLORIDE SERPL-SCNC: 111 MMOL/L (ref 99–112)
CO2 SERPL-SCNC: 18.2 MMOL/L (ref 24.3–31.9)
COHGB MFR BLD: 0.7 % (ref 0–5)
COHGB MFR BLD: 0.7 % (ref 0–5)
CREAT BLD-MCNC: 0.37 MG/DL (ref 0.43–1.29)
DEPRECATED RDW RBC AUTO: 44.5 FL (ref 37–54)
EOSINOPHIL # BLD AUTO: 0 10*3/MM3 (ref 0–0.7)
EOSINOPHIL NFR BLD AUTO: 0 % (ref 0–5)
ERYTHROCYTE [DISTWIDTH] IN BLOOD BY AUTOMATED COUNT: 13.3 % (ref 11.5–14.5)
GFR SERPL CREATININE-BSD FRML MDRD: >150 ML/MIN/1.73
GLUCOSE BLD-MCNC: 299 MG/DL (ref 70–110)
GLUCOSE BLDC GLUCOMTR-MCNC: 221 MG/DL (ref 70–130)
GLUCOSE BLDC GLUCOMTR-MCNC: 287 MG/DL (ref 70–130)
HCO3 BLDA-SCNC: 22.1 MMOL/L (ref 22–26)
HCO3 BLDA-SCNC: 24.7 MMOL/L (ref 22–26)
HCT VFR BLD AUTO: 36.8 % (ref 37–47)
HCT VFR BLD CALC: 38 % (ref 37–47)
HCT VFR BLD CALC: 41 % (ref 37–47)
HGB BLD-MCNC: 12.6 G/DL (ref 12–16)
HGB BLDA-MCNC: 13 G/DL (ref 12–16)
HGB BLDA-MCNC: 14.1 G/DL (ref 12–16)
HOROWITZ INDEX BLD+IHG-RTO: 55 %
HOROWITZ INDEX BLD+IHG-RTO: 60 %
IMM GRANULOCYTES # BLD AUTO: 0.08 10*3/MM3 (ref 0–0.03)
IMM GRANULOCYTES NFR BLD AUTO: 0.7 % (ref 0–0.5)
LYMPHOCYTES # BLD AUTO: 2.06 10*3/MM3 (ref 1–3)
LYMPHOCYTES NFR BLD AUTO: 16.8 % (ref 21–51)
MCH RBC QN AUTO: 33.1 PG (ref 27–33)
MCHC RBC AUTO-ENTMCNC: 34.2 G/DL (ref 33–37)
MCV RBC AUTO: 96.6 FL (ref 80–94)
METHGB BLD QL: 0.1 % (ref 0–3)
METHGB BLD QL: 0.2 % (ref 0–3)
MODALITY: ABNORMAL
MODALITY: NORMAL
MONOCYTES # BLD AUTO: 2.3 10*3/MM3 (ref 0.1–0.9)
MONOCYTES NFR BLD AUTO: 18.8 % (ref 0–10)
NEUTROPHILS # BLD AUTO: 7.78 10*3/MM3 (ref 1.4–6.5)
NEUTROPHILS NFR BLD AUTO: 63.6 % (ref 30–70)
OSMOLALITY SERPL CALC.SUM OF ELEC: 294.1 MOSM/KG (ref 273–305)
OXYHGB MFR BLDV: 95.4 % (ref 85–100)
OXYHGB MFR BLDV: 97.4 % (ref 85–100)
PCO2 BLDA: 37.2 MM HG (ref 35–45)
PCO2 BLDA: 39.2 MM HG (ref 35–45)
PEEP RESPIRATORY: 10 CM[H2O]
PEEP RESPIRATORY: 8 CM[H2O]
PH BLDA: 7.37 PH UNITS (ref 7.35–7.45)
PH BLDA: 7.44 PH UNITS (ref 7.35–7.45)
PLATELET # BLD AUTO: 175 10*3/MM3 (ref 130–400)
PMV BLD AUTO: 12.8 FL (ref 6–10)
PO2 BLDA: 119.2 MM HG (ref 80–100)
PO2 BLDA: 82 MM HG (ref 80–100)
POTASSIUM BLD-SCNC: 3.7 MMOL/L (ref 3.5–5.3)
RBC # BLD AUTO: 3.81 10*6/MM3 (ref 4.2–5.4)
SAO2 % BLDCOA: 96.2 % (ref 90–100)
SAO2 % BLDCOA: 98.3 % (ref 90–100)
SET MECH RESP RATE: 18
SET MECH RESP RATE: 18
SODIUM BLD-SCNC: 143 MMOL/L (ref 135–153)
VENTILATOR MODE: ABNORMAL
VENTILATOR MODE: AC
VT ON VENT VENT: 330 ML
VT ON VENT VENT: 340 ML
WBC NRBC COR # BLD: 12.23 10*3/MM3 (ref 4.5–12.5)

## 2019-03-01 PROCEDURE — 99291 CRITICAL CARE FIRST HOUR: CPT | Performed by: INTERNAL MEDICINE

## 2019-03-01 PROCEDURE — 36600 WITHDRAWAL OF ARTERIAL BLOOD: CPT | Performed by: PHYSICIAN ASSISTANT

## 2019-03-01 PROCEDURE — 83050 HGB METHEMOGLOBIN QUAN: CPT | Performed by: PHYSICIAN ASSISTANT

## 2019-03-01 PROCEDURE — 82375 ASSAY CARBOXYHB QUANT: CPT | Performed by: PHYSICIAN ASSISTANT

## 2019-03-01 PROCEDURE — 94799 UNLISTED PULMONARY SVC/PX: CPT

## 2019-03-01 PROCEDURE — 82805 BLOOD GASES W/O2 SATURATION: CPT | Performed by: PHYSICIAN ASSISTANT

## 2019-03-01 PROCEDURE — 99232 SBSQ HOSP IP/OBS MODERATE 35: CPT | Performed by: INTERNAL MEDICINE

## 2019-03-01 PROCEDURE — 25010000002 HEPARIN (PORCINE) PER 1000 UNITS: Performed by: INTERNAL MEDICINE

## 2019-03-01 PROCEDURE — 71045 X-RAY EXAM CHEST 1 VIEW: CPT

## 2019-03-01 PROCEDURE — 63710000001 INSULIN ASPART PER 5 UNITS: Performed by: INTERNAL MEDICINE

## 2019-03-01 PROCEDURE — 25010000002 HYDROCORTISONE SODIUM SUCCINATE 100 MG RECONSTITUTED SOLUTION: Performed by: PHYSICIAN ASSISTANT

## 2019-03-01 PROCEDURE — 71045 X-RAY EXAM CHEST 1 VIEW: CPT | Performed by: RADIOLOGY

## 2019-03-01 PROCEDURE — 82962 GLUCOSE BLOOD TEST: CPT

## 2019-03-01 PROCEDURE — 80048 BASIC METABOLIC PNL TOTAL CA: CPT | Performed by: INTERNAL MEDICINE

## 2019-03-01 PROCEDURE — 25010000002 AZITHROMYCIN: Performed by: INTERNAL MEDICINE

## 2019-03-01 PROCEDURE — 94003 VENT MGMT INPAT SUBQ DAY: CPT

## 2019-03-01 PROCEDURE — 85025 COMPLETE CBC W/AUTO DIFF WBC: CPT | Performed by: INTERNAL MEDICINE

## 2019-03-01 PROCEDURE — 25010000002 PROPOFOL 1000 MG/ML EMULSION: Performed by: PHYSICIAN ASSISTANT

## 2019-03-01 RX ORDER — L.ACID,PARA/B.BIFIDUM/S.THERM 8B CELL
1 CAPSULE ORAL DAILY
Status: DISCONTINUED | OUTPATIENT
Start: 2019-03-01 | End: 2019-03-07 | Stop reason: HOSPADM

## 2019-03-01 RX ORDER — CHLORHEXIDINE GLUCONATE 0.12 MG/ML
15 RINSE ORAL EVERY 12 HOURS SCHEDULED
Status: DISCONTINUED | OUTPATIENT
Start: 2019-03-01 | End: 2019-03-07 | Stop reason: HOSPADM

## 2019-03-01 RX ORDER — DEXTROSE MONOHYDRATE 25 G/50ML
25 INJECTION, SOLUTION INTRAVENOUS
Status: DISCONTINUED | OUTPATIENT
Start: 2019-03-01 | End: 2019-03-07 | Stop reason: HOSPADM

## 2019-03-01 RX ORDER — BUMETANIDE 0.25 MG/ML
1 INJECTION INTRAMUSCULAR; INTRAVENOUS ONCE
Status: COMPLETED | OUTPATIENT
Start: 2019-03-01 | End: 2019-03-01

## 2019-03-01 RX ORDER — NICOTINE POLACRILEX 4 MG
15 LOZENGE BUCCAL
Status: DISCONTINUED | OUTPATIENT
Start: 2019-03-01 | End: 2019-03-07 | Stop reason: HOSPADM

## 2019-03-01 RX ADMIN — HEPARIN SODIUM 5000 UNITS: 5000 INJECTION INTRAVENOUS; SUBCUTANEOUS at 22:05

## 2019-03-01 RX ADMIN — INSULIN ASPART 4 UNITS: 100 INJECTION, SOLUTION INTRAVENOUS; SUBCUTANEOUS at 22:09

## 2019-03-01 RX ADMIN — IPRATROPIUM BROMIDE AND ALBUTEROL SULFATE 3 ML: .5; 3 SOLUTION RESPIRATORY (INHALATION) at 18:52

## 2019-03-01 RX ADMIN — LACOSAMIDE 200 MG: 50 TABLET, FILM COATED ORAL at 16:57

## 2019-03-01 RX ADMIN — IPRATROPIUM BROMIDE AND ALBUTEROL SULFATE 3 ML: .5; 3 SOLUTION RESPIRATORY (INHALATION) at 00:53

## 2019-03-01 RX ADMIN — OSELTAMIVIR PHOSPHATE 75 MG: 75 CAPSULE ORAL at 09:24

## 2019-03-01 RX ADMIN — VIGABATRIN 1000 MG: 500 POWDER, FOR SOLUTION ORAL at 09:25

## 2019-03-01 RX ADMIN — SODIUM CHLORIDE, PRESERVATIVE FREE 10 ML: 5 INJECTION INTRAVENOUS at 09:26

## 2019-03-01 RX ADMIN — IPRATROPIUM BROMIDE AND ALBUTEROL SULFATE 3 ML: .5; 3 SOLUTION RESPIRATORY (INHALATION) at 13:15

## 2019-03-01 RX ADMIN — PROPOFOL 20 MCG/KG/MIN: 10 INJECTION, EMULSION INTRAVENOUS at 02:38

## 2019-03-01 RX ADMIN — CLONAZEPAM 2 MG: 1 TABLET ORAL at 16:57

## 2019-03-01 RX ADMIN — LEVOTHYROXINE SODIUM ANHYDROUS 50 MCG: 100 INJECTION, POWDER, LYOPHILIZED, FOR SOLUTION INTRAVENOUS at 11:32

## 2019-03-01 RX ADMIN — CHLORHEXIDINE GLUCONATE 15 ML: 1.2 RINSE ORAL at 09:25

## 2019-03-01 RX ADMIN — PANTOPRAZOLE SODIUM 40 MG: 40 INJECTION, POWDER, FOR SOLUTION INTRAVENOUS at 22:04

## 2019-03-01 RX ADMIN — SODIUM CHLORIDE, POTASSIUM CHLORIDE, SODIUM LACTATE AND CALCIUM CHLORIDE 1500 ML: 600; 310; 30; 20 INJECTION, SOLUTION INTRAVENOUS at 12:36

## 2019-03-01 RX ADMIN — VIGABATRIN 1000 MG: 500 POWDER, FOR SOLUTION ORAL at 22:03

## 2019-03-01 RX ADMIN — OSELTAMIVIR PHOSPHATE 75 MG: 75 CAPSULE ORAL at 22:06

## 2019-03-01 RX ADMIN — PANTOPRAZOLE SODIUM 40 MG: 40 INJECTION, POWDER, FOR SOLUTION INTRAVENOUS at 09:24

## 2019-03-01 RX ADMIN — HEPARIN SODIUM 5000 UNITS: 5000 INJECTION INTRAVENOUS; SUBCUTANEOUS at 06:57

## 2019-03-01 RX ADMIN — CLONAZEPAM 2 MG: 1 TABLET ORAL at 09:24

## 2019-03-01 RX ADMIN — NOREPINEPHRINE BITARTRATE 0.22 MCG/KG/MIN: 1 INJECTION INTRAVENOUS at 02:36

## 2019-03-01 RX ADMIN — CLONAZEPAM 2 MG: 1 TABLET ORAL at 22:02

## 2019-03-01 RX ADMIN — BUMETANIDE 1 MG: 0.25 INJECTION INTRAMUSCULAR; INTRAVENOUS at 13:53

## 2019-03-01 RX ADMIN — Medication: at 12:43

## 2019-03-01 RX ADMIN — LACOSAMIDE 200 MG: 50 TABLET, FILM COATED ORAL at 22:05

## 2019-03-01 RX ADMIN — HYDROCORTISONE SODIUM SUCCINATE 50 MG: 100 INJECTION, POWDER, FOR SOLUTION INTRAMUSCULAR; INTRAVENOUS at 11:31

## 2019-03-01 RX ADMIN — PROPOFOL 20 MCG/KG/MIN: 10 INJECTION, EMULSION INTRAVENOUS at 22:05

## 2019-03-01 RX ADMIN — SODIUM CHLORIDE, PRESERVATIVE FREE 3 ML: 5 INJECTION INTRAVENOUS at 22:40

## 2019-03-01 RX ADMIN — SODIUM CHLORIDE, PRESERVATIVE FREE 10 ML: 5 INJECTION INTRAVENOUS at 22:39

## 2019-03-01 RX ADMIN — AZITHROMYCIN MONOHYDRATE 500 MG: 500 INJECTION, POWDER, LYOPHILIZED, FOR SOLUTION INTRAVENOUS at 11:32

## 2019-03-01 RX ADMIN — HYDROCORTISONE SODIUM SUCCINATE 50 MG: 100 INJECTION, POWDER, FOR SOLUTION INTRAMUSCULAR; INTRAVENOUS at 16:58

## 2019-03-01 RX ADMIN — LACOSAMIDE 200 MG: 50 TABLET, FILM COATED ORAL at 09:25

## 2019-03-01 RX ADMIN — HEPARIN SODIUM 5000 UNITS: 5000 INJECTION INTRAVENOUS; SUBCUTANEOUS at 13:54

## 2019-03-01 RX ADMIN — INSULIN ASPART 8 UNITS: 100 INJECTION, SOLUTION INTRAVENOUS; SUBCUTANEOUS at 17:01

## 2019-03-01 RX ADMIN — NOREPINEPHRINE BITARTRATE 0.1 MCG/KG/MIN: 1 INJECTION INTRAVENOUS at 22:13

## 2019-03-01 RX ADMIN — SODIUM CHLORIDE, PRESERVATIVE FREE 3 ML: 5 INJECTION INTRAVENOUS at 09:26

## 2019-03-01 RX ADMIN — IPRATROPIUM BROMIDE AND ALBUTEROL SULFATE 3 ML: .5; 3 SOLUTION RESPIRATORY (INHALATION) at 07:18

## 2019-03-01 RX ADMIN — DOXYCYCLINE 100 MG: 100 INJECTION, POWDER, LYOPHILIZED, FOR SOLUTION INTRAVENOUS at 11:32

## 2019-03-01 RX ADMIN — HYDROCORTISONE SODIUM SUCCINATE 50 MG: 100 INJECTION, POWDER, FOR SOLUTION INTRAMUSCULAR; INTRAVENOUS at 22:05

## 2019-03-01 RX ADMIN — CHLORHEXIDINE GLUCONATE 15 ML: 1.2 RINSE ORAL at 22:02

## 2019-03-01 RX ADMIN — HYDROCORTISONE SODIUM SUCCINATE 50 MG: 100 INJECTION, POWDER, FOR SOLUTION INTRAMUSCULAR; INTRAVENOUS at 05:09

## 2019-03-01 RX ADMIN — DOXYCYCLINE 100 MG: 100 INJECTION, POWDER, LYOPHILIZED, FOR SOLUTION INTRAVENOUS at 22:04

## 2019-03-01 RX ADMIN — Medication 1 CAPSULE: at 12:43

## 2019-03-01 RX ADMIN — VIGABATRIN 1000 MG: 500 POWDER, FOR SOLUTION ORAL at 16:57

## 2019-03-01 NOTE — PLAN OF CARE
Problem: Patient Care Overview  Goal: Plan of Care Review  Outcome: Ongoing (interventions implemented as appropriate)    Goal: Individualization and Mutuality  Outcome: Ongoing (interventions implemented as appropriate)    Goal: Discharge Needs Assessment  Outcome: Ongoing (interventions implemented as appropriate)    Goal: Interprofessional Rounds/Family Conf  Outcome: Ongoing (interventions implemented as appropriate)      Problem: Skin Injury Risk (Adult)  Goal: Identify Related Risk Factors and Signs and Symptoms  Outcome: Ongoing (interventions implemented as appropriate)    Goal: Skin Health and Integrity  Outcome: Ongoing (interventions implemented as appropriate)      Problem: Ventilation, Mechanical Invasive (Adult)  Goal: Signs and Symptoms of Listed Potential Problems Will be Absent, Minimized or Managed (Ventilation, Mechanical Invasive)  Outcome: Ongoing (interventions implemented as appropriate)      Problem: Fall Risk (Adult)  Goal: Identify Related Risk Factors and Signs and Symptoms  Outcome: Ongoing (interventions implemented as appropriate)    Goal: Absence of Fall  Outcome: Ongoing (interventions implemented as appropriate)      Problem: Pneumonia (Adult)  Goal: Signs and Symptoms of Listed Potential Problems Will be Absent, Minimized or Managed (Pneumonia)  Outcome: Ongoing (interventions implemented as appropriate)

## 2019-03-01 NOTE — PROGRESS NOTES
[facility] HOSPITALIST PROGRESS NOTE     Patient Identification:  Name:  Alyson Mercado  Age:  19 y.o.  Sex:  female  :  2000  MRN:  [id number]  Visit Number:  [id number]  ROOM: [address]     Primary Care Provider:  William Zaldivar MD    Length of stay:  3    Subjective     Chief Complaint   Patient presents with   • Loss of Consciousness       -patient seen and examined reviewed her condition discussed the case with the healthcare team and with the family at the bedside who is her grandma.  After discussing the case in details the grandma wishes the patient not to be resuscitated with chest compressions ok to use the ventilator.  Patient reported to have shaking episode propofol has been added by pulmonary.      -patient seen and examined reviewed her condition discussed the case with her grandma at the bedside, patient had EEG that showed a partial complex seizure    3/1  -pt seen and examined, is been on minimal pressor, no new events noted, no reported szr activities      Objective     Current Hospital Meds:    azithromycin 500 mg Intravenous Q24H   bumetanide 1 mg Intravenous Once   chlorhexidine 15 mL Mouth/Throat Q12H   clonazePAM 2 mg Per J Tube TID   doxycycline 100 mg Intravenous Q12H   heparin (porcine) 5,000 Units Subcutaneous Q8H   hydrocortisone sodium succinate 50 mg Intravenous Q6H   ipratropium-albuterol 3 mL Nebulization Q6H - RT   lacosamide 200 mg Per J Tube TID   lactated ringers 1,500 mL Intravenous Once   lactobacillus acidophilus 1 capsule Oral Daily   levothyroxine sodium 50 mcg Intravenous Daily   oseltamivir 75 mg Per G Tube Q12H   pantoprazole 40 mg Intravenous Q12H   sodium chloride 10 mL Intravenous Q12H   sodium chloride 3 mL Intravenous Q12H   Vigabatrin 1,000 mg Per PEG Tube TID       fentaNYL (SUBLIMAZE) PCA 1500 mcg/30 mL syringe     norepinephrine 0.02-0.3 mcg/kg/min Last Rate: 0.18 mcg/kg/min (19 1150)   propofol 5-50 mcg/kg/min Last  Rate: 20 mcg/kg/min (03/01/19 0238)     ----------------------------------------------------------------------------------------------------------------------  Vital Signs:  Temp:  [98 °F (36.7 °C)-99.3 °F (37.4 °C)] 99.3 °F (37.4 °C)  Heart Rate:  [] 98  Resp:  [18] 18  BP: ()/(54-98) 108/62  FiO2 (%):  [55 %-60 %] 55 %  SpO2:  [94 %-100 %] 95 %  on   ;   Device (Oxygen Therapy): ventilator  Body mass index is 30.18 kg/m².    Wt Readings from Last 3 Encounters:   02/27/19 44.9 kg (98 lb 14.4 oz)       Intake/Output Summary (Last 24 hours) at 3/1/2019 1256  Last data filed at 3/1/2019 1236  Gross per 24 hour   Intake 2438.36 ml   Output 1325 ml   Net 1113.36 ml     NPO Diet  ----------------------------------------------------------------------------------------------------------------------  Physical exam:  Constitutional: Debilitated, cerebral palsy, muscular wasting muscular wasting, no reported szr activities       HENT:  Head: Normocephalic and atraumatic.  Mouth:  Moist mucous membranes.    Eyes:  Conjunctivae and EOM are normal.  Pupils are equal, round, and reactive to light.  No scleral icterus.  Neck:  Neck supple.  No JVD present.    Cardiovascular:  Normal rate, regular rhythm and normal heart sounds with mid diastolic murmur at the mitral area  Pulmonary/Chest:  No respiratory distress, no wheezes, bilateral diffuse crackles, with normal breath sounds and good air movement.  Abdominal:  Soft.  Bowel sounds are normal. No tenderness.  PEG tube insertion site is clean mildly distended abdomen  Musculoskeletal:  No edema, no tenderness, and no deformity.  No red or swollen joints anywhere.    Neurological: Diffuse muscular wasting and deformed face, consistent with a cerebral palsy, cerebromegaly  Skin:  Skin is warm and dry.  No rash noted.  No pallor.          ----------------------------------------------------------------------------------------------------------------------  Results from  last 7 days   Lab Units 02/27/19  0444 02/26/19  1408   CRP mg/dL  --  24.90*   LACTATE mmol/L  --  0.6   WBC 10*3/mm3 12.79* 15.65*   HEMOGLOBIN g/dL 14.2 15.4   HEMATOCRIT % 42.2 43.7   MCV fL 96.3* 96.7*   MCHC g/dL 33.6 35.2   PLATELETS 10*3/mm3 145 192     Results from last 7 days   Lab Units 03/01/19  0350 02/28/19  1619 02/28/19  0738 02/27/19  0444 02/26/19  1408   SODIUM mmol/L 143  --   --  147 129*   POTASSIUM mmol/L 3.7 4.5 2.6* 3.3* 4.5   MAGNESIUM mg/dL  --   --  1.6  --  2.5*   CHLORIDE mmol/L 111  --   --  112 92*   CO2 mmol/L 18.2*  --   --  23.9* 22.6*   BUN mg/dL 7  --   --  <5* 32*   CREATININE mg/dL 0.37*  --   --  0.23* 0.94   EGFR IF NONAFRICN AM mL/min/1.73 >150  --   --  >150 76   CALCIUM mg/dL 7.7  --   --  6.6* 8.3   GLUCOSE mg/dL 299*  --   --  201* 152*   ALBUMIN g/dL  --   --   --   --  3.50   BILIRUBIN mg/dL  --   --   --   --  0.1*   ALK PHOS U/L  --   --   --   --  196*   AST (SGOT) U/L  --   --   --   --  88*   ALT (SGPT) U/L  --   --   --   --  4*   Estimated Creatinine Clearance: 142.8 mL/min (A) (by C-G formula based on SCr of 0.37 mg/dL (L)).  Results from last 7 days   Lab Units 02/26/19  1408   CK TOTAL U/L 37   TROPONIN I ng/mL 0.008     No results found for: HGBA1C, POCGLU  No results found for: AMMONIA    Results from last 7 days   Lab Units 02/26/19  1408   NITRITE UA  Negative   WBC UA /HPF 6-12*   BACTERIA UA /HPF 3+*   SQUAM EPITHEL UA /HPF 21-30*   URINECX  No growth     Blood Culture   Date Value Ref Range Status   02/26/2019 No growth at 24 hours  Preliminary   02/26/2019 No growth at 24 hours  Preliminary      Urine Culture   Date Value Ref Range Status   02/26/2019 No growth at 24 hours  Preliminary        I have personally looked at the labs and they are summarized above.  ----------------------------------------------------------------------------------------------------------------------  Imaging Results (last 24 hours)     Procedure Component Value Units  Date/Time    XR Chest 1 View [[id number]] Collected:  03/01/19 0733     Updated:  03/01/19 0736    Narrative:       XR CHEST 1 VW-     CLINICAL INDICATION: sob; J96.01-Acute respiratory failure with hypoxia;  I95.9-Hypotension, unspecified; A41.9-Sepsis, unspecified organism;  J18.9-Pneumonia, unspecified organism; N30.00-Acute cystitis without  hematuria          COMPARISON: 2/28/2019      TECHNIQUE: Single frontal view of the chest.     FINDINGS:     Endotracheal tube tip is at the thoracic inlet.  Right suprahilar consolidation  There is no evidence of an acute osseous abnormality.   There are no suspicious-appearing parenchymal soft tissue nodules.            Impression:       Right suprahilar consolidation  Endotracheal tube as above         This report was finalized on 3/1/2019 7:33 AM by Dr. Andrea Balbuena MD.           I have personally reviewed the radiology images and read the final radiology report.        Assessment & Plan       *Septic shock due to community-acquired pneumonia and UTI  *Of staff culture 1 out of 2, contamination suspected, repeat blood cultures today  *Cerebral palsy with history of seizure disorder, currently in partial complex seizures that has been treated, on propofol  *Influenza A  *Dilantin toxicity  *Dysphagia status post PEG tube, questionable aspiration  *Metabolic encephalopathy due to above  *Acute hypoxemic respiratory failure due to above status post intubation  *Thalamic calcification       --Mechanical ventilation, managed by pulmonary, discussed, appreciated  --Continue azithromycin/doxycycline/oseltamivir  --Dilantin level in a.m., may restart in a.m.  --IV Bumex 1 mg x1  --Continue to titrate levo fed, aim for systolic blood pressure in the 90s  --On propofol  --Continue Vimpat, Clonazepam, Sibril   --DVT proph         Mhd Jose Anderson MD  03/01/19  12:56 PM

## 2019-03-01 NOTE — PROGRESS NOTES
LOS: 2 days     Chief Complaint:  Pulmonology is following for septic shock related to community acquired pneumonia, influenza A positive, acute hypoxic respiratory failure requiring mechanical ventilation.     Subjective     Interval History:     Patient remains intubated and on sedation today.  On settings of AC VC mode of 18, 330, 60%, 10.  Peak pressure noted at 29.  Minute ventilation at 10.4.  Sedation includes propofol and fentanyl.  Requiring vasopressin to maintain mean arterial pressure, which is noted at 84 today with a blood pressure 112/72.  Saturation was noted at 99%.  She continues to require the warmer for temperature maintenance.  EEG revealed evidence of partial seizure.  No occurrences have been reported overnight.  No fever reported overnight. Input/Output was net -725 mL over the past 24 hours, with 2 L output.     History taken from: chart family RN    Review of Systems:     Unable to obtain review of systems due to intubation and sedation.                   Objective     Vital Signs  Temp:  [98.5 °F (36.9 °C)-99.1 °F (37.3 °C)] 98.5 °F (36.9 °C)  Heart Rate:  [] 82  Resp:  [18] 18  BP: ()/(54-93) 97/64  FiO2 (%):  [60 %-70 %] 60 %  Body mass index is 30.18 kg/m².    Intake/Output Summary (Last 24 hours) at 2/28/2019 2210  Last data filed at 2/28/2019 2210  Gross per 24 hour   Intake 1099.81 ml   Output 1850 ml   Net -750.19 ml     I/O this shift:  In: 100 [IV Piggyback:100]  Out: -     Physical Exam:    GENERAL APPEARANCE: Intubated and sedated.  Appears to be resting comfortably in bed. Episodic myoclonic jerking noted.     HEAD: normocephalic. Atraumatic.     EYES: PERRLA. No scleral icterus.    THROAT: ET tube in place. Oral cavity and pharynx normal. No inflammation, swelling, exudate, or lesions.     NECK: Neck supple. No thyromegaly     CARDIAC: Normal S1 and S2. No S3, S4 or murmurs. Rhythm is regular. There is no peripheral edema, cyanosis or pallor. Extremities are warm  and well perfused. Capillary refill is less than 2 seconds. No carotid bruits.    RESPIRATORY: Bilateral air entry positive. Diminished breath sounds with bibasilar crackles noted at the lung bases. Diffuse rhonchi noted of the lungs bilaterally.   No wheezing upon auscultation.     GI: Positive bowel sounds. Soft, nondistended, nontender.     MUSCULOSKELETAL: No significant deformity or joint abnormality. No edema. Peripheral pulses intact.    NEUROLOGICAL: Unable to assess due to sedation status.     PSYCHIATRIC: Unable to assess due to sedation status.                 Results Review:                I reviewed the patient's new clinical results.  I reviewed the patient's new imaging results and agree with the interpretation.  Results from last 7 days   Lab Units 02/27/19  0444 02/26/19  1408   WBC 10*3/mm3 12.79* 15.65*   HEMOGLOBIN g/dL 14.2 15.4   PLATELETS 10*3/mm3 145 192     Results from last 7 days   Lab Units 02/28/19  1619 02/28/19  0738 02/27/19  0444 02/26/19  1408   SODIUM mmol/L  --   --  147 129*   POTASSIUM mmol/L 4.5 2.6* 3.3* 4.5   CHLORIDE mmol/L  --   --  112 92*   CO2 mmol/L  --   --  23.9* 22.6*   BUN mg/dL  --   --  <5* 32*   CREATININE mg/dL  --   --  0.23* 0.94   CALCIUM mg/dL  --   --  6.6* 8.3   GLUCOSE mg/dL  --   --  201* 152*   MAGNESIUM mg/dL  --  1.6  --  2.5*     No results found for: INR, PROTIME  Results from last 7 days   Lab Units 02/26/19  1408   ALK PHOS U/L 196*   BILIRUBIN mg/dL 0.1*   ALT (SGPT) U/L 4*   AST (SGOT) U/L 88*     Results from last 7 days   Lab Units 02/28/19  0458   PH, ARTERIAL pH units 7.323*   PO2 ART mm Hg 113.4*   PCO2, ARTERIAL mm Hg 41.0   HCO3 ART mmol/L 20.8*     Imaging Results (last 24 hours)     Procedure Component Value Units Date/Time    XR Chest 1 View [[id number]] Collected:  02/28/19 0722     Updated:  02/28/19 0725    Narrative:       XR CHEST 1 VW-     CLINICAL INDICATION: sob; J96.01-Acute respiratory failure with hypoxia;  I95.9-Hypotension,  unspecified; A41.9-Sepsis, unspecified organism;  J18.9-Pneumonia, unspecified organism; N30.00-Acute cystitis without  hematuria          COMPARISON: 2/27/2019      TECHNIQUE: Single frontal view of the chest.     FINDINGS:     Endotracheal tube and central line are stable.  Probable mild pulmonary congestion and superimposed right upper lobe  pneumonia  There is no evidence of an acute osseous abnormality.   There are no suspicious-appearing parenchymal soft tissue nodules.            Impression:       Right upper lobe pneumonia and probably mild pulmonary congestion         This report was finalized on 2/28/2019 7:23 AM by Dr. Andrea Balbuena MD.                Medication Review:   Scheduled Medications:    azithromycin 500 mg Intravenous Q24H   clonazePAM 2 mg Per J Tube TID   doxycycline 100 mg Intravenous Q12H   heparin (porcine) 5,000 Units Subcutaneous Q8H   hydrocortisone sodium succinate 50 mg Intravenous Q6H   ipratropium-albuterol 3 mL Nebulization Q6H - RT   lacosamide 200 mg Per J Tube TID   levothyroxine sodium 50 mcg Intravenous Daily   oseltamivir 75 mg Per G Tube Q12H   pantoprazole 40 mg Intravenous Q12H   sodium chloride 10 mL Intravenous Q12H   sodium chloride 3 mL Intravenous Q12H   Vigabatrin 1,000 mg Per PEG Tube TID     Continuous infusions:    fentaNYL (SUBLIMAZE) PCA 1500 mcg/30 mL syringe     norepinephrine 0.02-0.3 mcg/kg/min Last Rate: 0.24 mcg/kg/min (02/28/19 0014)   propofol 5-50 mcg/kg/min Last Rate: 20 mcg/kg/min (02/28/19 1615)       Assessment/Plan     Patient Active Problem List   Diagnosis Code   • Acute respiratory failure with hypoxia (CMS/[facility]) J96.01       Assessment:  -Acute hypoxic and hypercarbic respiratory failure, requiring mechanical ventilation  - Anoxic, hypoxic, metabolic encephalopathy versus status epilepticus  - Mechanical ventilator dependent  - Right upper lobe pneumonia  - Influenza type A positive.       Central nervous system:  Intubated and sedated. Episodic  myoclonic jerks noted.   EEG abnormal study.  Supportive diffuse cortical dysfunction, as seen in toxic metabolic encephalopathic states and evidence of partial onset seizure.  EEG completed on 2/27/2019.  Plan   - Pain management: fentanyl drip.   - Sedation: Propofol , RASS goal: 0 to -1  - Patient does not have biot's type of breathing pattern while on opioids.   - I highly recommend avoiding nighttime disturbances and light exposure during night to maintain normal circadian rhythms to avoid hypoactive or hyperactive delirium  - Required Lorazepam IV push due to seizure like activity. Movements improved shortly after.   - Continue Clonazepam.      Cardiac/aorta  Plan:  - Goal mean arterial pressure is greater than 65 mmHg      Respiratory:  ACVC mode of 18, 330, 60%, 10.   ABG on 70% FiO2 showed 7.323, 41.0, 113.4, 20.8.  Chest x-ray: reviewed.   ET tube location: Within 5 cm from the celeste.  Plan   - I have reviewed the ventilator graphics.  Patient is synchronous with mechanical ventilator.  There is no auto PEEP or leak appreciated.  - I'm keeping tidal volume equal to 6 ML per KG ideal body weight.  - Mechanical ventilators were changed to keep Plateau pressure less than 30 cm H20  - I will titrate FiO2 and PEEP as per ARDS NET protocol.   - Goal SPO2 greater than 90%.  - I have reviewed the ABG.  Vent settings were changed as per ABG results  - Changed the tidal volume to 340.   - Continue Azithromycin, DuoNeb, Linezolid, Meropenem, Tamilfu.       Gastrointestinal/Liver  Route of feeding: PEG tube  Plan:  - Continue with PPI for ulcer prophylaxis.  - Nutrition team on board.  Appreciate nutrition team recommendations       Genitorenal:  Input/Output was net -725 mL over the past 24 hours, with 2 L output.   Plan   - I will avoid nephrotoxic agents  - Pharmacy is on board for dosages of medication based on creatinine clearance.  Appreciate pharmacy recommendations.  - I will replete serum electrolytes as per  ICU electrolyte replacement protocol.      Endocrine   Glucose elevated above 180.  Plan   - Goal serum glucose level is 180 mg/dL while in ICU.  I strongly recommend starting insulin drip if 2 consecutive serum glucose levels are greater than 200 mg/dL.   - Continue Levothyroxine IV.        Infectious disease   Leukocytosis noted yesterday at 12.79.  Influenza A positive. MRSA negative. Urine culture negative.   Blood cultures are pending currently negative.   Plan   - Current antibiotics  Linezolid, Meropenem  - Continue Tamiflu.       Heme/Onc   Hemoglobin noted yesterday at 14.2.  Platelets were stable yesterday at 145.  Plan   - I recommend PRBC transfusion if hemoglobin is less than 7 g/dL unless active bleeding or cardiac ischemia.  - Continue with heparin subcutaneous for DVT prophylaxis.  - Hold chemical anticoagulation if platelet count is less than 50,000.      Musculoskeletal   Plan   - Turn the patient every 2 hours to prevent pressure ulcers.      Activity   - I recommend aggressive PTOT while in hospital to avoid critical care neuropathy/myopathy.      Lines:           Quality measure:  1. Elevation of the head of the bed to 30-45 degrees- yes  2. Daily sedation vacation and daily assessment of readiness to extubate-yes  3. Peptic ulcer disease prophylaxis- yes  4. Deep venous thrombosis prophylaxis- yes (chemical)  5. Indwelling urinary catheter - required for accurate urine output.       Code status:  No CPR, cardioversion/defibrillation.       Emergency contact listed as Damaris Mercado (mother).         Karis Ortiz PA-C  02/28/19  10:10 PM      Scribed for Dr. Skaggs by Karis Ortiz PA-C    The clinical plan was discussed extensively with the nurse, and respiratory therapist.     Critical Care time spent in direct patient care: 34 minutes (excluding procedure time) including high complexity decision making to assess, and support vital organ system failure in this individual who has impairment  of one or more vital organ systems such that there is a high probability of imminent or life threatening deterioration in the patient’s condition.     Patient is critically ill and is at higher risk for further mortality/morbidity. Continue ICU care.        I, Billy Skaggs M.D. attest that the above note accurately reflects the work and decisions made by me. Patient was seen and evaluated by me, including history of present illness, physical exam, assessment, and treatment plan.  The above note was reviewed and edited by me.        Billy Skaggs M.D  Pulmonary and Critical Care Medicine

## 2019-03-01 NOTE — PROGRESS NOTES
Discharge Planning Assessment   [facility]     Patient Name: Alyson Mercado  MRN: [id number]  Today's Date: 3/1/2019    Admit Date: 2/26/2019      Discharge Plan     Row Name 03/01/19 0959       Plan    Plan  Pt lives at home with her mother and plans to return home at discharge. Pt utilizes [facility]. [facility] to be contacted at discharge. Pt has all needed DME at home. SS will follow and assist as needed.     Patient/Family in Agreement with Plan  yes        Expected Discharge Date and Time     Expected Discharge Date Expected Discharge Time    Mar 3, 2019             Chata Herman

## 2019-03-01 NOTE — PROGRESS NOTES
"Palliative Care Progress Note    Date of Admission: 2/26/2019    Code Status:   Current Code Status     Date Active Code Status Order ID Comments User Context       2/28/2019 12:00 No CPR [id number]  Elsy Schilling APRN Inpatient       Questions for Current Code Status     Question Answer Comment    Code Status No CPR     Medical Interventions (Level of Support Prior to Arrest) Limited     Limited Support to NOT Include Cardioversion/Defibrillation     Level Of Support Discussed With Health Care Surrogate         Advance Directive: Guardianship papers on file   Surrogate decision maker: Abundio Mercado    Subjective:    Patient appears more peaceful today. Face flushing noted and spoke to RN about this.  Grandma seen outside unit and she doesn't need anything today.     Reviewed current scheduled and prn medications for route, type, dose, and frequency.    fentaNYL (SUBLIMAZE) PCA 1500 mcg/30 mL syringe     norepinephrine 0.02-0.3 mcg/kg/min Last Rate: 0.18 mcg/kg/min (03/01/19 1150)   propofol 5-50 mcg/kg/min Last Rate: 20 mcg/kg/min (03/01/19 0238)     •  acetaminophen  •  heparin flush (porcine)  •  [COMPLETED] Insert peripheral IV **AND** sodium chloride  •  sodium chloride  •  sodium chloride  •  sodium chloride    Objective:  PPS 30 /62   Pulse 97   Temp 99.3 °F (37.4 °C)   Resp 18   Ht 121.9 cm (48\")   Wt 44.9 kg (98 lb 14.4 oz)   SpO2 96%   BMI 30.18 kg/m²    Intake & Output (last day)       02/28 0701 - 03/01 0700 03/01 0701 - 03/02 0700    I.V. (mL/kg) 588.4 (13.1)     NG/GT      IV Piggyback 450 1750    Total Intake(mL/kg) 1038.4 (23.1) 1750 (39)    Urine (mL/kg/hr) 1325 (1.2)     Stool 0     Total Output 1325     Net -286.6 +1750          Stool Unmeasured Occurrence 2 x         Lab Results (last 24 hours)     Procedure Component Value Units Date/Time    Blood Culture - Blood, Arm, Right [[id number]]  (Abnormal) Collected:  02/26/19 1408    Specimen:  Blood from Arm, Right Updated:  " 03/01/19 0738     Blood Culture Gram positive cocci, consistent with Staph spp     Isolated from --     Gram Stain Gram positive cocci in clusters    Blood Culture - Blood, Wrist, Right [[id number]] Collected:  02/28/19 1619    Specimen:  Blood from Wrist, Right Updated:  03/01/19 0530     Blood Culture No growth at less than 24 hours    Blood Gas, Arterial [[id number]]  (Abnormal) Collected:  03/01/19 0508    Specimen:  Arterial Blood Updated:  03/01/19 0519     Site Arterial: right radial     Dominik's Test Positive     pH, Arterial 7.368 pH units      pCO2, Arterial 39.2 mm Hg      pO2, Arterial 119.2 mm Hg      HCO3, Arterial 22.1 mmol/L      Base Excess, Arterial -2.9 mmol/L      O2 Saturation, Arterial 98.3 %      Hemoglobin, Blood Gas 14.1 g/dL      Hematocrit, Blood Gas 41.0 %      Oxyhemoglobin 97.4 %      Methemoglobin 0.20 %      Carboxyhemoglobin 0.7 %      A-a Gradiant 246.9 mmHg      Temperature 98.6 C      Barometric Pressure for Blood Gas 729 mmHg      Modality Adult Vent     FIO2 60 %      Ventilator Mode AC VC     Set Tidal Volume 340     Set Mech Resp Rate 18     PEEP 10    Osmolality, Calculated [[id number]]  (Normal) Collected:  03/01/19 0350    Specimen:  Blood Updated:  03/01/19 0427     Osmolality Calc 294.1 mOsm/kg     Basic Metabolic Panel [[id number]]  (Abnormal) Collected:  03/01/19 0350    Specimen:  Blood Updated:  03/01/19 0427     Glucose 299 mg/dL      BUN 7 mg/dL      Creatinine 0.37 mg/dL      Sodium 143 mmol/L      Potassium 3.7 mmol/L      Comment: 1+ Hemolysis 2+ Lipemia         Chloride 111 mmol/L      CO2 18.2 mmol/L      Calcium 7.7 mg/dL      eGFR Non African Amer >150 mL/min/1.73      BUN/Creatinine Ratio 18.9     Anion Gap 13.8 mmol/L     Narrative:       GFR Normal >60  Chronic Kidney Disease <60  Kidney Failure <15    Potassium [[id number]]  (Normal) Collected:  02/28/19 1619    Specimen:  Blood Updated:  02/28/19 1649     Potassium 4.5 mmol/L     Blood Culture - Blood, Hand,  Right [[id number]] Collected:  02/26/19 1400    Specimen:  Blood from Hand, Right Updated:  02/28/19 1500     Blood Culture No growth at 2 days    Blood Culture ID, PCR - Blood, Arm, Right [[id number]]  (Abnormal) Collected:  02/26/19 1408    Specimen:  Blood from Arm, Right Updated:  02/28/19 1442     BCID, PCR Staphylococcus spp, not aureus. Identification by BCID PCR.        Imaging Results (last 24 hours)     Procedure Component Value Units Date/Time    XR Chest 1 View [[id number]] Collected:  03/01/19 0733     Updated:  03/01/19 0736    Narrative:       XR CHEST 1 VW-     CLINICAL INDICATION: sob; J96.01-Acute respiratory failure with hypoxia;  I95.9-Hypotension, unspecified; A41.9-Sepsis, unspecified organism;  J18.9-Pneumonia, unspecified organism; N30.00-Acute cystitis without  hematuria          COMPARISON: 2/28/2019      TECHNIQUE: Single frontal view of the chest.     FINDINGS:     Endotracheal tube tip is at the thoracic inlet.  Right suprahilar consolidation  There is no evidence of an acute osseous abnormality.   There are no suspicious-appearing parenchymal soft tissue nodules.            Impression:       Right suprahilar consolidation  Endotracheal tube as above         This report was finalized on 3/1/2019 7:33 AM by Dr. Andrea Balbuena MD.             Physical Exam:  Gen: ill appearing   Skin: warm, dry  Eyes: LEROY, conjunctiva clear and moist  HEENT: oral cavity moist, ET tube in place.   Resp/thorax: even effort, non labored, vent controlled, some rales noted   CV: RRR  ABD: soft, bowel sounds hypoactive   Ext: no edema, cool extremities, drop foot bilaterally, facial flushing   Neuro: sedated, no myoclonic jerking during exam today       Reviewed labs and diagnostic results.   No results found for: HGBA1C  Results from last 7 days   Lab Units 02/27/19  0444   WBC 10*3/mm3 12.79*   HEMOGLOBIN g/dL 14.2   HEMATOCRIT % 42.2   PLATELETS 10*3/mm3 145     Results from last 7 days   Lab Units  03/01/19  0350  02/26/19  1408   SODIUM mmol/L 143   < > 129*   POTASSIUM mmol/L 3.7   < > 4.5   CHLORIDE mmol/L 111   < > 92*   CO2 mmol/L 18.2*   < > 22.6*   BUN mg/dL 7   < > 32*   CREATININE mg/dL 0.37*   < > 0.94   CALCIUM mg/dL 7.7   < > 8.3   BILIRUBIN mg/dL  --   --  0.1*   ALK PHOS U/L  --   --  196*   ALT (SGPT) U/L  --   --  4*   AST (SGOT) U/L  --   --  88*   GLUCOSE mg/dL 299*   < > 152*    < > = values in this interval not displayed.       Impression: 19 y.o. female with Cerebral Palsy, that was admitted with sepsis 2/2 UTI and bilateral PNA.  Acute on chronic hypoxemic respiratory failure requiring mechanical ventilation to protect airway    Plan:     1.  Goals of Care   -  states she doesn't need anything today.  FiO2 decreased some today.  Off cooling blanket and temp is staying normal.  Decreased myoclonic jerks.     2.  Myoclonic Jerks/Seizure activity   - Patient more calm today.  Looks more peaceful.  No myoclonic jerks noted on exam today.  EEG results noted.     3.  Respiratory Failure   - Vent supported, FiO2 was decreased some today.  Continue supportive care with patient and guardian.     4.  Altered Mental Status   - Sedated, unable to determine at this time if she will return to normal baseline.     5.  Flushing   - facial flushing noted.  Reported to RN.  Temp is 97.1.  RN reports back is red also and stephanie area.  I explained she may want to report just in case a reaction to med.      Time: 30 minutes   > 50% time spent in counseling and education concerning current orders for symptom management with RN, palliative RN,     Elsy Schilling, APRN  [phone]  03/01/19  1:22 PM

## 2019-03-02 LAB
027 TOXIN: NORMAL
ANION GAP SERPL CALCULATED.3IONS-SCNC: 16.3 MMOL/L (ref 3.6–11.2)
BUN BLD-MCNC: 10 MG/DL (ref 7–21)
BUN/CREAT SERPL: 32.3 (ref 7–25)
C DIFF TOX GENS STL QL NAA+PROBE: NEGATIVE
CALCIUM SPEC-SCNC: 7.7 MG/DL (ref 7.7–10)
CHLORIDE SERPL-SCNC: 109 MMOL/L (ref 99–112)
CO2 SERPL-SCNC: 25.7 MMOL/L (ref 24.3–31.9)
CREAT BLD-MCNC: 0.31 MG/DL (ref 0.43–1.29)
DEPRECATED RDW RBC AUTO: 44.2 FL (ref 37–54)
ERYTHROCYTE [DISTWIDTH] IN BLOOD BY AUTOMATED COUNT: 13.3 % (ref 11.5–14.5)
GFR SERPL CREATININE-BSD FRML MDRD: >150 ML/MIN/1.73
GLUCOSE BLD-MCNC: 243 MG/DL (ref 70–110)
GLUCOSE BLDC GLUCOMTR-MCNC: 178 MG/DL (ref 70–130)
GLUCOSE BLDC GLUCOMTR-MCNC: 208 MG/DL (ref 70–130)
GLUCOSE BLDC GLUCOMTR-MCNC: 213 MG/DL (ref 70–130)
GLUCOSE BLDC GLUCOMTR-MCNC: 214 MG/DL (ref 70–130)
HCT VFR BLD AUTO: 37.2 % (ref 37–47)
HGB BLD-MCNC: 12.7 G/DL (ref 12–16)
MCH RBC QN AUTO: 32.6 PG (ref 27–33)
MCHC RBC AUTO-ENTMCNC: 34.1 G/DL (ref 33–37)
MCV RBC AUTO: 95.6 FL (ref 80–94)
OSMOLALITY SERPL CALC.SUM OF ELEC: 306.9 MOSM/KG (ref 273–305)
PLATELET # BLD AUTO: 188 10*3/MM3 (ref 130–400)
PMV BLD AUTO: 12.1 FL (ref 6–10)
POTASSIUM BLD-SCNC: 2.7 MMOL/L (ref 3.5–5.3)
RBC # BLD AUTO: 3.89 10*6/MM3 (ref 4.2–5.4)
SODIUM BLD-SCNC: 151 MMOL/L (ref 135–153)
WBC NRBC COR # BLD: 12.7 10*3/MM3 (ref 4.5–12.5)

## 2019-03-02 PROCEDURE — 94799 UNLISTED PULMONARY SVC/PX: CPT

## 2019-03-02 PROCEDURE — 25010000002 HEPARIN (PORCINE) PER 1000 UNITS: Performed by: INTERNAL MEDICINE

## 2019-03-02 PROCEDURE — 94003 VENT MGMT INPAT SUBQ DAY: CPT

## 2019-03-02 PROCEDURE — 25010000002 AZITHROMYCIN: Performed by: INTERNAL MEDICINE

## 2019-03-02 PROCEDURE — 82962 GLUCOSE BLOOD TEST: CPT

## 2019-03-02 PROCEDURE — 80048 BASIC METABOLIC PNL TOTAL CA: CPT | Performed by: INTERNAL MEDICINE

## 2019-03-02 PROCEDURE — 99233 SBSQ HOSP IP/OBS HIGH 50: CPT | Performed by: INTERNAL MEDICINE

## 2019-03-02 PROCEDURE — 85027 COMPLETE CBC AUTOMATED: CPT | Performed by: INTERNAL MEDICINE

## 2019-03-02 PROCEDURE — 63710000001 INSULIN ASPART PER 5 UNITS: Performed by: INTERNAL MEDICINE

## 2019-03-02 PROCEDURE — 25010000002 HYDROCORTISONE SODIUM SUCCINATE 100 MG RECONSTITUTED SOLUTION: Performed by: PHYSICIAN ASSISTANT

## 2019-03-02 PROCEDURE — 87493 C DIFF AMPLIFIED PROBE: CPT | Performed by: INTERNAL MEDICINE

## 2019-03-02 PROCEDURE — 25010000002 PROPOFOL 1000 MG/ML EMULSION: Performed by: PHYSICIAN ASSISTANT

## 2019-03-02 PROCEDURE — 80186 ASSAY OF PHENYTOIN FREE: CPT | Performed by: INTERNAL MEDICINE

## 2019-03-02 RX ORDER — POTASSIUM CHLORIDE 750 MG/1
40 CAPSULE, EXTENDED RELEASE ORAL AS NEEDED
Status: DISCONTINUED | OUTPATIENT
Start: 2019-03-02 | End: 2019-03-07 | Stop reason: HOSPADM

## 2019-03-02 RX ORDER — BUMETANIDE 0.25 MG/ML
2 INJECTION INTRAMUSCULAR; INTRAVENOUS ONCE
Status: COMPLETED | OUTPATIENT
Start: 2019-03-02 | End: 2019-03-02

## 2019-03-02 RX ORDER — GLYCOPYRROLATE 1 MG/1
0.5 TABLET ORAL EVERY 8 HOURS SCHEDULED
Status: DISCONTINUED | OUTPATIENT
Start: 2019-03-02 | End: 2019-03-07 | Stop reason: HOSPADM

## 2019-03-02 RX ORDER — POTASSIUM CHLORIDE 1.5 G/1.77G
40 POWDER, FOR SOLUTION ORAL EVERY 4 HOURS
Status: COMPLETED | OUTPATIENT
Start: 2019-03-02 | End: 2019-03-02

## 2019-03-02 RX ORDER — POTASSIUM CHLORIDE 1.5 G/1.77G
40 POWDER, FOR SOLUTION ORAL AS NEEDED
Status: DISCONTINUED | OUTPATIENT
Start: 2019-03-02 | End: 2019-03-07 | Stop reason: HOSPADM

## 2019-03-02 RX ADMIN — HYDROCORTISONE SODIUM SUCCINATE 50 MG: 100 INJECTION, POWDER, FOR SOLUTION INTRAMUSCULAR; INTRAVENOUS at 14:18

## 2019-03-02 RX ADMIN — DOXYCYCLINE 100 MG: 100 INJECTION, POWDER, LYOPHILIZED, FOR SOLUTION INTRAVENOUS at 22:24

## 2019-03-02 RX ADMIN — INSULIN ASPART 4 UNITS: 100 INJECTION, SOLUTION INTRAVENOUS; SUBCUTANEOUS at 07:01

## 2019-03-02 RX ADMIN — LEVOTHYROXINE SODIUM ANHYDROUS 50 MCG: 100 INJECTION, POWDER, LYOPHILIZED, FOR SOLUTION INTRAVENOUS at 10:14

## 2019-03-02 RX ADMIN — PANTOPRAZOLE SODIUM 40 MG: 40 INJECTION, POWDER, FOR SOLUTION INTRAVENOUS at 10:15

## 2019-03-02 RX ADMIN — LACOSAMIDE 200 MG: 50 TABLET, FILM COATED ORAL at 20:51

## 2019-03-02 RX ADMIN — PROPOFOL 20 MCG/KG/MIN: 10 INJECTION, EMULSION INTRAVENOUS at 17:02

## 2019-03-02 RX ADMIN — SODIUM CHLORIDE, PRESERVATIVE FREE 3 ML: 5 INJECTION INTRAVENOUS at 10:16

## 2019-03-02 RX ADMIN — GLYCOPYRROLATE 0.5 MG: 1 TABLET ORAL at 21:02

## 2019-03-02 RX ADMIN — VIGABATRIN 1000 MG: 500 POWDER, FOR SOLUTION ORAL at 20:35

## 2019-03-02 RX ADMIN — PANTOPRAZOLE SODIUM 40 MG: 40 INJECTION, POWDER, FOR SOLUTION INTRAVENOUS at 20:33

## 2019-03-02 RX ADMIN — IPRATROPIUM BROMIDE AND ALBUTEROL SULFATE 3 ML: .5; 3 SOLUTION RESPIRATORY (INHALATION) at 12:50

## 2019-03-02 RX ADMIN — CHLORHEXIDINE GLUCONATE 15 ML: 1.2 RINSE ORAL at 10:13

## 2019-03-02 RX ADMIN — IPRATROPIUM BROMIDE AND ALBUTEROL SULFATE 3 ML: .5; 3 SOLUTION RESPIRATORY (INHALATION) at 00:44

## 2019-03-02 RX ADMIN — CHLORHEXIDINE GLUCONATE 15 ML: 1.2 RINSE ORAL at 20:29

## 2019-03-02 RX ADMIN — HYDROCORTISONE SODIUM SUCCINATE 50 MG: 100 INJECTION, POWDER, FOR SOLUTION INTRAMUSCULAR; INTRAVENOUS at 21:02

## 2019-03-02 RX ADMIN — IPRATROPIUM BROMIDE AND ALBUTEROL SULFATE 3 ML: .5; 3 SOLUTION RESPIRATORY (INHALATION) at 18:40

## 2019-03-02 RX ADMIN — SODIUM CHLORIDE, PRESERVATIVE FREE 10 ML: 5 INJECTION INTRAVENOUS at 20:33

## 2019-03-02 RX ADMIN — Medication: at 08:13

## 2019-03-02 RX ADMIN — OSELTAMIVIR PHOSPHATE 75 MG: 75 CAPSULE ORAL at 10:15

## 2019-03-02 RX ADMIN — SODIUM CHLORIDE, PRESERVATIVE FREE 10 ML: 5 INJECTION INTRAVENOUS at 10:16

## 2019-03-02 RX ADMIN — HYDROCORTISONE SODIUM SUCCINATE 50 MG: 100 INJECTION, POWDER, FOR SOLUTION INTRAMUSCULAR; INTRAVENOUS at 17:01

## 2019-03-02 RX ADMIN — Medication 1 CAPSULE: at 10:15

## 2019-03-02 RX ADMIN — LACOSAMIDE 200 MG: 50 TABLET, FILM COATED ORAL at 10:12

## 2019-03-02 RX ADMIN — SODIUM CHLORIDE, PRESERVATIVE FREE 3 ML: 5 INJECTION INTRAVENOUS at 20:32

## 2019-03-02 RX ADMIN — POTASSIUM CHLORIDE 40 MEQ: 1.5 POWDER, FOR SOLUTION ORAL at 14:19

## 2019-03-02 RX ADMIN — CLONAZEPAM 2 MG: 1 TABLET ORAL at 10:12

## 2019-03-02 RX ADMIN — ACETAMINOPHEN 650 MG: 160 SOLUTION ORAL at 07:04

## 2019-03-02 RX ADMIN — CLONAZEPAM 2 MG: 1 TABLET ORAL at 20:51

## 2019-03-02 RX ADMIN — OSELTAMIVIR PHOSPHATE 75 MG: 75 CAPSULE ORAL at 20:32

## 2019-03-02 RX ADMIN — INSULIN ASPART 4 UNITS: 100 INJECTION, SOLUTION INTRAVENOUS; SUBCUTANEOUS at 17:05

## 2019-03-02 RX ADMIN — AZITHROMYCIN MONOHYDRATE 500 MG: 500 INJECTION, POWDER, LYOPHILIZED, FOR SOLUTION INTRAVENOUS at 10:11

## 2019-03-02 RX ADMIN — CLONAZEPAM 2 MG: 1 TABLET ORAL at 17:03

## 2019-03-02 RX ADMIN — VIGABATRIN 1000 MG: 500 POWDER, FOR SOLUTION ORAL at 17:03

## 2019-03-02 RX ADMIN — LACOSAMIDE 200 MG: 50 TABLET, FILM COATED ORAL at 17:02

## 2019-03-02 RX ADMIN — HYDROCORTISONE SODIUM SUCCINATE 50 MG: 100 INJECTION, POWDER, FOR SOLUTION INTRAMUSCULAR; INTRAVENOUS at 04:51

## 2019-03-02 RX ADMIN — INSULIN ASPART 2 UNITS: 100 INJECTION, SOLUTION INTRAVENOUS; SUBCUTANEOUS at 14:23

## 2019-03-02 RX ADMIN — POTASSIUM CHLORIDE 40 MEQ: 1.5 POWDER, FOR SOLUTION ORAL at 20:32

## 2019-03-02 RX ADMIN — POTASSIUM CHLORIDE 40 MEQ: 1.5 POWDER, FOR SOLUTION ORAL at 13:11

## 2019-03-02 RX ADMIN — HEPARIN SODIUM 5000 UNITS: 5000 INJECTION INTRAVENOUS; SUBCUTANEOUS at 14:20

## 2019-03-02 RX ADMIN — BUMETANIDE 2 MG: 0.25 INJECTION INTRAMUSCULAR; INTRAVENOUS at 13:11

## 2019-03-02 RX ADMIN — HEPARIN SODIUM 5000 UNITS: 5000 INJECTION INTRAVENOUS; SUBCUTANEOUS at 06:56

## 2019-03-02 RX ADMIN — INSULIN ASPART 4 UNITS: 100 INJECTION, SOLUTION INTRAVENOUS; SUBCUTANEOUS at 20:50

## 2019-03-02 RX ADMIN — VIGABATRIN 1000 MG: 500 POWDER, FOR SOLUTION ORAL at 10:12

## 2019-03-02 RX ADMIN — HEPARIN SODIUM 5000 UNITS: 5000 INJECTION INTRAVENOUS; SUBCUTANEOUS at 21:02

## 2019-03-02 RX ADMIN — DOXYCYCLINE 100 MG: 100 INJECTION, POWDER, LYOPHILIZED, FOR SOLUTION INTRAVENOUS at 10:10

## 2019-03-02 NOTE — PROGRESS NOTES
[facility] HOSPITALIST PROGRESS NOTE     Patient Identification:  Name:  Alyson Mercado  Age:  19 y.o.  Sex:  female  :  2000  MRN:  [id number]  Visit Number:  [id number]  ROOM: [address]     Primary Care Provider:  William Zaldivar MD    Length of stay:  4    Subjective     Chief Complaint   Patient presents with   • Loss of Consciousness       -patient seen and examined reviewed her condition discussed the case with the healthcare team and with the family at the bedside who is her grandma.  After discussing the case in details the grandma wishes the patient not to be resuscitated with chest compressions ok to use the ventilator.  Patient reported to have shaking episode propofol has been added by pulmonary.      -patient seen and examined reviewed her condition discussed the case with her grandma at the bedside, patient had EEG that showed a partial complex seizure    3/1  -pt seen and examined, is been on minimal pressor, no new events noted, no reported szr activities    3/2  -pt seen and examined, on vent same setting, no new cardiopulmonary complaints, no fever or chills reported        Objective     Current Hospital Meds:    azithromycin 500 mg Intravenous Q24H   bumetanide 2 mg Intravenous Once   chlorhexidine 15 mL Mouth/Throat Q12H   clonazePAM 2 mg Per J Tube TID   doxycycline 100 mg Intravenous Q12H   heparin (porcine) 5,000 Units Subcutaneous Q8H   hydrocortisone sodium succinate 50 mg Intravenous Q6H   insulin aspart 0-9 Units Subcutaneous 4x Daily AC & at Bedtime   ipratropium-albuterol 3 mL Nebulization Q6H - RT   lacosamide 200 mg Per J Tube TID   lactobacillus acidophilus 1 capsule Oral Daily   levothyroxine sodium 50 mcg Intravenous Daily   oseltamivir 75 mg Per G Tube Q12H   pantoprazole 40 mg Intravenous Q12H   potassium chloride 40 mEq Oral Q4H   sodium chloride 10 mL Intravenous Q12H   sodium chloride 3 mL Intravenous Q12H   Vigabatrin 1,000 mg Per PEG  Tube TID       fentaNYL (SUBLIMAZE) PCA 1500 mcg/30 mL syringe     norepinephrine 0.02-0.3 mcg/kg/min Last Rate: 0.1 mcg/kg/min (03/01/19 2213)   propofol 5-50 mcg/kg/min Last Rate: 20 mcg/kg/min (03/01/19 2205)     ----------------------------------------------------------------------------------------------------------------------  Vital Signs:  Temp:  [98.1 °F (36.7 °C)-100.1 °F (37.8 °C)] 100.1 °F (37.8 °C)  Heart Rate:  [] 114  Resp:  [18-20] 20  BP: ()/() 111/75  FiO2 (%):  [55 %-60 %] 55 %  SpO2:  [89 %-98 %] 95 %  on   ;   Device (Oxygen Therapy): ventilator  Body mass index is 30.18 kg/m².    Wt Readings from Last 3 Encounters:   02/27/19 44.9 kg (98 lb 14.4 oz)       Intake/Output Summary (Last 24 hours) at 3/2/2019 0959  Last data filed at 3/2/2019 045  Gross per 24 hour   Intake 3973.82 ml   Output 1900 ml   Net 2073.82 ml     NPO Diet  ----------------------------------------------------------------------------------------------------------------------  Physical exam:  Constitutional: Debilitated, cerebral palsy, muscular wasting muscular wasting, no reported szr activities, noted to have intermittent tachycrdia    HENT:  Head: Normocephalic and atraumatic.  Mouth:  Moist mucous membranes.    Eyes:  Conjunctivae and EOM are normal.  Pupils are equal, round, and reactive to light.  No scleral icterus.  Neck:  Neck supple.  No JVD present.    Cardiovascular:  Normal rate, regular rhythm and normal heart sounds with mid diastolic murmur at the mitral area  Pulmonary/Chest:  No respiratory distress, no wheezes, bilateral diffuse crackles, with normal breath sounds and good air movement.  Abdominal:  Soft.  Bowel sounds are normal. No tenderness.  PEG tube insertion site is clean mildly distended abdomen  Musculoskeletal:  No edema, no tenderness, and no deformity.  No red or swollen joints anywhere.    Neurological: Diffuse muscular wasting and deformed face, consistent with a cerebral  palsy, cerebromegaly  Skin:  Skin is warm and dry.  No rash noted.  No pallor.          ----------------------------------------------------------------------------------------------------------------------  Results from last 7 days   Lab Units 03/02/19  0256 03/01/19  1207 02/27/19  0444 02/26/19  1408   CRP mg/dL  --   --   --  24.90*   LACTATE mmol/L  --   --   --  0.6   WBC 10*3/mm3 12.70* 12.23 12.79* 15.65*   HEMOGLOBIN g/dL 12.7 12.6 14.2 15.4   HEMATOCRIT % 37.2 36.8* 42.2 43.7   MCV fL 95.6* 96.6* 96.3* 96.7*   MCHC g/dL 34.1 34.2 33.6 35.2   PLATELETS 10*3/mm3 188 175 145 192     Results from last 7 days   Lab Units 03/02/19  0256 03/01/19  0350 02/28/19  1619 02/28/19  0738 02/27/19  0444 02/26/19  1408   SODIUM mmol/L 151 143  --   --  147 129*   POTASSIUM mmol/L 2.7* 3.7 4.5 2.6* 3.3* 4.5   MAGNESIUM mg/dL  --   --   --  1.6  --  2.5*   CHLORIDE mmol/L 109 111  --   --  112 92*   CO2 mmol/L 25.7 18.2*  --   --  23.9* 22.6*   BUN mg/dL 10 7  --   --  <5* 32*   CREATININE mg/dL 0.31* 0.37*  --   --  0.23* 0.94   EGFR IF NONAFRICN AM mL/min/1.73 >150 >150  --   --  >150 76   CALCIUM mg/dL 7.7 7.7  --   --  6.6* 8.3   GLUCOSE mg/dL 243* 299*  --   --  201* 152*   ALBUMIN g/dL  --   --   --   --   --  3.50   BILIRUBIN mg/dL  --   --   --   --   --  0.1*   ALK PHOS U/L  --   --   --   --   --  196*   AST (SGOT) U/L  --   --   --   --   --  88*   ALT (SGPT) U/L  --   --   --   --   --  4*   Estimated Creatinine Clearance: 170.5 mL/min (A) (by C-G formula based on SCr of 0.31 mg/dL (L)).  Results from last 7 days   Lab Units 02/26/19  1408   CK TOTAL U/L 37   TROPONIN I ng/mL 0.008     Glucose   Date/Time Value Ref Range Status   03/02/2019 0658 214 (H) 70 - 130 mg/dL Final   03/01/2019 2208 221 (H) 70 - 130 mg/dL Final   03/01/2019 1656 287 (H) 70 - 130 mg/dL Final     No results found for: AMMONIA    Results from last 7 days   Lab Units 02/26/19  1408   NITRITE UA  Negative   WBC UA /HPF 6-12*   BACTERIA UA  /HPF 3+*   LUDY MARADIAGA UA /HPF 21-30*   URINECX  No growth     Blood Culture   Date Value Ref Range Status   02/26/2019 No growth at 24 hours  Preliminary   02/26/2019 No growth at 24 hours  Preliminary      Urine Culture   Date Value Ref Range Status   02/26/2019 No growth at 24 hours  Preliminary        I have personally looked at the labs and they are summarized above.  ----------------------------------------------------------------------------------------------------------------------    Assessment & Plan       *Septic shock due to community-acquired pneumonia   *Suspected UTI, rolled out with clean culture  *Positive Blood culture 1 out of 2, staph epi, contamination suspected, repeat blood cultures clean  *Cerebral palsy with history of seizure disorder, currently in partial complex seizures that has been treated, on propofol  *Influenza A  *Dilantin toxicity, improved levels  *Dysphagia status post PEG tube, questionable aspiration  *Metabolic encephalopathy due to above  *Acute hypoxemic respiratory failure due to above status post intubation  *Thalamic calcification  *New onset progressive liquid diarrhea       --Mechanical ventilation, managed by pulmonary, discussed, appreciated  --Continue azithromycin / doxycycline / oseltamivir  --awaiting the dilantin level to be out (pending)  --check CDT  --IV Bumex 1 mg x1  --Continue to titrate levo fed  --Continue Vimpat, Clonazepam, Sibril   --DVT proph         Mhd Jose Anderson MD  03/02/19  9:41 AM

## 2019-03-02 NOTE — PROGRESS NOTES
LOS: 3 days     Chief Complaint:  Pulmonology is following for septic shock related to community acquire pneumonia, influenza A positive, acute hypoxic respiratory failure requiring mechanical ventilation.     Subjective     Interval History:     Remains intubated and sedated on Fentanyl and propofol. ACVC mode of 18, 340, 60%, 10. She is diaphoretic in appearance upon assessment. No myoclonic movements noted at this time. Receiving tube feeds via PEG tube. Requiring levophed to maintain mean arterial pressure, noted at 94. Blood pressure at 117/83, with saturation at 94%. Tachycardia noted at 115. Using warmer as needed to maintain body temperature. No fever or significant decrease in temperature noted.     History taken from: chart family RN    Review of Systems:     Unable to obtain review of systems due to intubation and sedation.                     Objective     Vital Signs  Temp:  [98.1 °F (36.7 °C)-99.3 °F (37.4 °C)] 98.1 °F (36.7 °C)  Heart Rate:  [] 65  Resp:  [18] 18  BP: ()/() 112/73  FiO2 (%):  [55 %-60 %] 55 %  Body mass index is 30.18 kg/m².    Intake/Output Summary (Last 24 hours) at 3/1/2019 2326  Last data filed at 3/1/2019 2204  Gross per 24 hour   Intake 2811.36 ml   Output 325 ml   Net 2486.36 ml     I/O this shift:  In: 100 [IV Piggyback:100]  Out: -     Physical Exam:  GENERAL APPEARANCE: Intubated and sedated.  Appears to be resting comfortably in bed. Diaphoretic.     HEAD: normocephalic. Atraumatic.     EYES: PERRLA. No scleral icterus.    THROAT: ET tube in place. Oral cavity and pharynx normal. No inflammation, swelling, exudate, or lesions.     NECK: Neck supple. No thyromegaly     CARDIAC: Normal S1 and S2. No S3, S4 or murmurs. Rhythm is regular. There is no peripheral edema, cyanosis or pallor. Extremities are warm and well perfused. Capillary refill is less than 2 seconds.    RESPIRATORY: Bilateral air entry positive. Diminished breath sounds with bibasilar  crackles noted at the lung bases.  No wheezing or rhonchi upon auscultation.     GI: Positive bowel sounds. Soft, nondistended, nontender.     MUSCULOSKELETAL: No significant deformity or joint abnormality. No edema. Peripheral pulses intact.    NEUROLOGICAL: Unable to assess due to sedation status.     PSYCHIATRIC: Unable to assess due to sedation status.                             Results Review:                I reviewed the patient's new clinical results.  I reviewed the patient's new imaging results and agree with the interpretation.  Results from last 7 days   Lab Units 03/01/19  1207 02/27/19  0444 02/26/19  1408   WBC 10*3/mm3 12.23 12.79* 15.65*   HEMOGLOBIN g/dL 12.6 14.2 15.4   PLATELETS 10*3/mm3 175 145 192     Results from last 7 days   Lab Units 03/01/19  0350 02/28/19  1619 02/28/19  0738 02/27/19  0444 02/26/19  1408   SODIUM mmol/L 143  --   --  147 129*   POTASSIUM mmol/L 3.7 4.5 2.6* 3.3* 4.5   CHLORIDE mmol/L 111  --   --  112 92*   CO2 mmol/L 18.2*  --   --  23.9* 22.6*   BUN mg/dL 7  --   --  <5* 32*   CREATININE mg/dL 0.37*  --   --  0.23* 0.94   CALCIUM mg/dL 7.7  --   --  6.6* 8.3   GLUCOSE mg/dL 299*  --   --  201* 152*   MAGNESIUM mg/dL  --   --  1.6  --  2.5*     No results found for: INR, PROTIME  Results from last 7 days   Lab Units 02/26/19  1408   ALK PHOS U/L 196*   BILIRUBIN mg/dL 0.1*   ALT (SGPT) U/L 4*   AST (SGOT) U/L 88*     Results from last 7 days   Lab Units 03/01/19  1345   PH, ARTERIAL pH units 7.440   PO2 ART mm Hg 82.0   PCO2, ARTERIAL mm Hg 37.2   HCO3 ART mmol/L 24.7     Imaging Results (last 24 hours)     Procedure Component Value Units Date/Time    XR Chest 1 View [[id number]] Collected:  03/01/19 0733     Updated:  03/01/19 0736    Narrative:       XR CHEST 1 VW-     CLINICAL INDICATION: sob; J96.01-Acute respiratory failure with hypoxia;  I95.9-Hypotension, unspecified; A41.9-Sepsis, unspecified organism;  J18.9-Pneumonia, unspecified organism; N30.00-Acute  cystitis without  hematuria          COMPARISON: 2/28/2019      TECHNIQUE: Single frontal view of the chest.     FINDINGS:     Endotracheal tube tip is at the thoracic inlet.  Right suprahilar consolidation  There is no evidence of an acute osseous abnormality.   There are no suspicious-appearing parenchymal soft tissue nodules.            Impression:       Right suprahilar consolidation  Endotracheal tube as above         This report was finalized on 3/1/2019 7:33 AM by Dr. Andrea Balbuena MD.                Medication Review:   Scheduled Medications:    azithromycin 500 mg Intravenous Q24H   chlorhexidine 15 mL Mouth/Throat Q12H   clonazePAM 2 mg Per J Tube TID   doxycycline 100 mg Intravenous Q12H   heparin (porcine) 5,000 Units Subcutaneous Q8H   hydrocortisone sodium succinate 50 mg Intravenous Q6H   insulin aspart 0-9 Units Subcutaneous 4x Daily AC & at Bedtime   ipratropium-albuterol 3 mL Nebulization Q6H - RT   lacosamide 200 mg Per J Tube TID   lactobacillus acidophilus 1 capsule Oral Daily   levothyroxine sodium 50 mcg Intravenous Daily   oseltamivir 75 mg Per G Tube Q12H   pantoprazole 40 mg Intravenous Q12H   sodium chloride 10 mL Intravenous Q12H   sodium chloride 3 mL Intravenous Q12H   Vigabatrin 1,000 mg Per PEG Tube TID     Continuous infusions:    fentaNYL (SUBLIMAZE) PCA 1500 mcg/30 mL syringe     norepinephrine 0.02-0.3 mcg/kg/min Last Rate: 0.1 mcg/kg/min (03/01/19 2213)   propofol 5-50 mcg/kg/min Last Rate: 20 mcg/kg/min (03/01/19 2205)       Assessment/Plan     Patient Active Problem List   Diagnosis Code   • Acute respiratory failure with hypoxia (CMS/[facility]) J96.01           Assessment:  - Acute hypoxic and hypercarbic respiratory failure, requiring mechanical ventilation  - Mechanical ventilator dependence  - Anoxic/hypoxic, metabolic encephalopathy versus status epilepticus  - Right upper lobe pneumonia  - Influenza type A positive      Central nervous system:  Intubated and sedated.   EEG:  2/27/2019. abnormal study.  Supportive diffuse cortical dysfunction, as seen in toxic metabolic encephalopathic states and evidence of partial onset seizure.   Plan   - Pain management: fentanyl drip.   - Sedation: Propofol , RASS goal: 0 to -1  - Patient does not have biot's type of breathing pattern while on opioids.   - I highly recommend avoiding nighttime disturbances and light exposure during night to maintain normal circadian rhythms to avoid hypoactive or hyperactive delirium  - Continue with clonazepam,       Cardiac/aorta  Vasopressor: norepinephrine.   Plan:  - Goal mean arterial pressure is greater than 65 mmHg  - Continue with Solucortef 50 mg IV every 6 hours  - Will decrease levophed as tolerated.       Respiratory:  ACVC mode of 18, 340, 60%, 10.   ABG showed 7.368, 39.2, 119.2, 22.1.  Chest x-ray: reviewed.   ET tube location: Within 5 cm from the celeste.  Plan   - I have reviewed the ventilator graphics.  Patient is synchronous with mechanical ventilator.  There is no auto PEEP or leak appreciated.  - I'm keeping tidal volume equal to 6 ML per KG ideal body weight.  - Mechanical ventilators were changed to keep Plateau pressure less than 30 cm H20  - I will titrate FiO2 and PEEP as per ARDS NET protocol.   - Goal SPO2 greater than 90%.  - I have reviewed the ABG.  Vent settings were changed as per ABG results  - Changed settings to 18, 330, 55%, 8. Ordered ABG following.   - Continue azithromycin, doxycyline, DuoNeb, Tamiflu.   - Ordered ringers lactate 150 mL      Gastrointestinal/Liver  CT abdomen and pelvis: 2/27/19. Thickening of the distal descending and sigmoid colon wall. Nonobstructing stones in both kidneys. Scoliosis.   Route of feeding: PEG tube.   Plan:  - Continue with PPI for ulcer prophylaxis.  - Continue with lactobacillus  - Nutrition team on board.  Appreciate nutrition team recommendations       Genitorenal:  Creatinine improved to 0.37.   Input/Output was net +86.4 mL over the  past 24 hours, with 1.32 L output.   Plan   - I will avoid nephrotoxic agents  - Pharmacy is on board for dosages of medication based on creatinine clearance.  Appreciate pharmacy recommendations.  - I will replete serum electrolytes as per ICU electrolyte replacement protocol.      Endocrine   Glucose trending upwards.   Plan   - Goal serum glucose level is 180 mg/dL while in ICU.  I strongly recommend starting insulin drip if 2 consecutive serum glucose levels are greater than 200 mg/dL.   - Continue insulin for glycemic control       Infectious disease   WBC improved to within normal limits, at 12.23.   ANC improving.   Influenza type A positive  Blood cultures pending, currently negative  MSRA negative.   Plan   - Current Azithromycin, Doxycycline, Tamiflu      Heme/Onc   Hemoglobin trended downward, but stable at 12.6  Platelets stable at 175.   Plan   - I recommend PRBC transfusion if hemoglobin is less than 7 g/dL unless active bleeding or cardiac ischemia.  - Continue with heparin subcutaneous for DVT prophylaxis.  - Hold chemical anticoagulation if platelet count is less than 50,000.      Musculoskeletal   Plan   - Turn the patient every 2 hours to prevent pressure ulcers.      Activity   - I recommend aggressive PTOT while in hospital to avoid critical care neuropathy/myopathy.      Lines:   - Single lumen implantable port left subclavian  - Peripheral IV 2/27/2019 (2 days)  - Gastrostomy/Enterostomy gastrostomy-jejunostomy   - Urethral Catheter 16 Fr. (3 days)  - ETT (3 days)      Quality measure:  1. Elevation of the head of the bed to 30-45 degrees- yes  2. Daily sedation vacation and daily assessment of readiness to extubate-yes  3. Peptic ulcer disease prophylaxis- yes  4. Deep venous thrombosis prophylaxis- yes (chemical)  5. Indwelling urinary catheter - required for accurate urine output.      Code status: No CPR, cardioversion/defibrillation.       Emergency contacts listed as Damaris Mercado  (mother).         Karis Ortiz PA-C  03/01/19  11:26 PM      Scribed for Dr. Skaggs by Karis Ortiz PA-C    The clinical plan was discussed extensively with the nurse, and respiratory therapist.     Critical Care time spent in direct patient care: 36 minutes (excluding procedure time) including high complexity decision making to assess, and support vital organ system failure in this individual who has impairment of one or more vital organ systems such that there is a high probability of imminent or life threatening deterioration in the patient’s condition.     Patient is critically ill and is at higher risk for further mortality/morbidity. Continue ICU care.        IBilly M.D. attest that the above note accurately reflects the work and decisions made by me. Patient was seen and evaluated by me, including history of present illness, physical exam, assessment, and treatment plan.  The above note was reviewed and edited by me.        Billy Skaggs M.D  Pulmonary and Critical Care Medicine

## 2019-03-03 LAB
ANION GAP SERPL CALCULATED.3IONS-SCNC: 7.8 MMOL/L (ref 3.6–11.2)
BACTERIA SPEC AEROBE CULT: ABNORMAL
BACTERIA SPEC AEROBE CULT: NORMAL
BUN BLD-MCNC: 10 MG/DL (ref 7–21)
BUN/CREAT SERPL: 37 (ref 7–25)
CALCIUM SPEC-SCNC: 7.6 MG/DL (ref 7.7–10)
CHLORIDE SERPL-SCNC: 116 MMOL/L (ref 99–112)
CO2 SERPL-SCNC: 26.2 MMOL/L (ref 24.3–31.9)
CREAT BLD-MCNC: 0.27 MG/DL (ref 0.43–1.29)
DEPRECATED RDW RBC AUTO: 45.9 FL (ref 37–54)
ERYTHROCYTE [DISTWIDTH] IN BLOOD BY AUTOMATED COUNT: 13.4 % (ref 11.5–14.5)
GFR SERPL CREATININE-BSD FRML MDRD: >150 ML/MIN/1.73
GLUCOSE BLD-MCNC: 187 MG/DL (ref 70–110)
GLUCOSE BLDC GLUCOMTR-MCNC: 124 MG/DL (ref 70–130)
GLUCOSE BLDC GLUCOMTR-MCNC: 131 MG/DL (ref 70–130)
GLUCOSE BLDC GLUCOMTR-MCNC: 139 MG/DL (ref 70–130)
GLUCOSE BLDC GLUCOMTR-MCNC: 161 MG/DL (ref 70–130)
GRAM STN SPEC: ABNORMAL
HCT VFR BLD AUTO: 34 % (ref 37–47)
HGB BLD-MCNC: 11.3 G/DL (ref 12–16)
ISOLATED FROM: ABNORMAL
MCH RBC QN AUTO: 32.7 PG (ref 27–33)
MCHC RBC AUTO-ENTMCNC: 33.2 G/DL (ref 33–37)
MCV RBC AUTO: 98.3 FL (ref 80–94)
OSMOLALITY SERPL CALC.SUM OF ELEC: 302 MOSM/KG (ref 273–305)
PLATELET # BLD AUTO: 170 10*3/MM3 (ref 130–400)
PMV BLD AUTO: 11.7 FL (ref 6–10)
POTASSIUM BLD-SCNC: 4.3 MMOL/L (ref 3.5–5.3)
RBC # BLD AUTO: 3.46 10*6/MM3 (ref 4.2–5.4)
SODIUM BLD-SCNC: 150 MMOL/L (ref 135–153)
WBC NRBC COR # BLD: 12.55 10*3/MM3 (ref 4.5–12.5)

## 2019-03-03 PROCEDURE — 25010000002 PROPOFOL 1000 MG/ML EMULSION: Performed by: PHYSICIAN ASSISTANT

## 2019-03-03 PROCEDURE — 82962 GLUCOSE BLOOD TEST: CPT

## 2019-03-03 PROCEDURE — 25010000002 METHYLPREDNISOLONE PER 40 MG: Performed by: HOSPITALIST

## 2019-03-03 PROCEDURE — 80048 BASIC METABOLIC PNL TOTAL CA: CPT | Performed by: INTERNAL MEDICINE

## 2019-03-03 PROCEDURE — 94799 UNLISTED PULMONARY SVC/PX: CPT

## 2019-03-03 PROCEDURE — 25010000002 MEROPENEM

## 2019-03-03 PROCEDURE — 85027 COMPLETE CBC AUTOMATED: CPT | Performed by: INTERNAL MEDICINE

## 2019-03-03 PROCEDURE — 63710000001 INSULIN ASPART PER 5 UNITS: Performed by: INTERNAL MEDICINE

## 2019-03-03 PROCEDURE — 25010000002 HEPARIN (PORCINE) PER 1000 UNITS: Performed by: INTERNAL MEDICINE

## 2019-03-03 PROCEDURE — 25010000002 HYDROCORTISONE SODIUM SUCCINATE 100 MG RECONSTITUTED SOLUTION: Performed by: PHYSICIAN ASSISTANT

## 2019-03-03 PROCEDURE — 94003 VENT MGMT INPAT SUBQ DAY: CPT

## 2019-03-03 PROCEDURE — 99233 SBSQ HOSP IP/OBS HIGH 50: CPT | Performed by: HOSPITALIST

## 2019-03-03 RX ORDER — METHYLPREDNISOLONE SODIUM SUCCINATE 40 MG/ML
20 INJECTION, POWDER, LYOPHILIZED, FOR SOLUTION INTRAMUSCULAR; INTRAVENOUS EVERY 8 HOURS
Status: DISCONTINUED | OUTPATIENT
Start: 2019-03-03 | End: 2019-03-05

## 2019-03-03 RX ORDER — DIPHENHYDRAMINE, LIDOCAINE, NYSTATIN
5 KIT ORAL 4 TIMES DAILY
Status: DISCONTINUED | OUTPATIENT
Start: 2019-03-03 | End: 2019-03-07 | Stop reason: HOSPADM

## 2019-03-03 RX ADMIN — SODIUM CHLORIDE, PRESERVATIVE FREE 10 ML: 5 INJECTION INTRAVENOUS at 20:12

## 2019-03-03 RX ADMIN — OSELTAMIVIR PHOSPHATE 75 MG: 75 CAPSULE ORAL at 09:41

## 2019-03-03 RX ADMIN — VIGABATRIN 1000 MG: 500 POWDER, FOR SOLUTION ORAL at 20:07

## 2019-03-03 RX ADMIN — HEPARIN SODIUM 5000 UNITS: 5000 INJECTION INTRAVENOUS; SUBCUTANEOUS at 21:59

## 2019-03-03 RX ADMIN — Medication: at 04:31

## 2019-03-03 RX ADMIN — METHYLPREDNISOLONE SODIUM SUCCINATE 20 MG: 40 INJECTION, POWDER, FOR SOLUTION INTRAMUSCULAR; INTRAVENOUS at 18:44

## 2019-03-03 RX ADMIN — GLYCOPYRROLATE 0.5 MG: 1 TABLET ORAL at 05:14

## 2019-03-03 RX ADMIN — HEPARIN SODIUM 5000 UNITS: 5000 INJECTION INTRAVENOUS; SUBCUTANEOUS at 15:09

## 2019-03-03 RX ADMIN — IPRATROPIUM BROMIDE AND ALBUTEROL SULFATE 3 ML: .5; 3 SOLUTION RESPIRATORY (INHALATION) at 06:53

## 2019-03-03 RX ADMIN — VIGABATRIN 1000 MG: 500 POWDER, FOR SOLUTION ORAL at 09:42

## 2019-03-03 RX ADMIN — LACOSAMIDE 200 MG: 50 TABLET, FILM COATED ORAL at 09:40

## 2019-03-03 RX ADMIN — INSULIN ASPART 2 UNITS: 100 INJECTION, SOLUTION INTRAVENOUS; SUBCUTANEOUS at 17:14

## 2019-03-03 RX ADMIN — LACOSAMIDE 200 MG: 50 TABLET, FILM COATED ORAL at 16:54

## 2019-03-03 RX ADMIN — GLYCOPYRROLATE 0.5 MG: 1 TABLET ORAL at 21:59

## 2019-03-03 RX ADMIN — DOXYCYCLINE 100 MG: 100 INJECTION, POWDER, LYOPHILIZED, FOR SOLUTION INTRAVENOUS at 22:00

## 2019-03-03 RX ADMIN — Medication 5 ML: at 11:51

## 2019-03-03 RX ADMIN — LACOSAMIDE 200 MG: 50 TABLET, FILM COATED ORAL at 20:09

## 2019-03-03 RX ADMIN — CHLORHEXIDINE GLUCONATE 15 ML: 1.2 RINSE ORAL at 09:40

## 2019-03-03 RX ADMIN — PANTOPRAZOLE SODIUM 40 MG: 40 INJECTION, POWDER, FOR SOLUTION INTRAVENOUS at 20:12

## 2019-03-03 RX ADMIN — GLYCOPYRROLATE 0.5 MG: 1 TABLET ORAL at 15:08

## 2019-03-03 RX ADMIN — SODIUM CHLORIDE, PRESERVATIVE FREE 3 ML: 5 INJECTION INTRAVENOUS at 09:42

## 2019-03-03 RX ADMIN — ACETAMINOPHEN 649.6 MG: 160 SOLUTION ORAL at 11:59

## 2019-03-03 RX ADMIN — DOXYCYCLINE 100 MG: 100 INJECTION, POWDER, LYOPHILIZED, FOR SOLUTION INTRAVENOUS at 10:30

## 2019-03-03 RX ADMIN — CLONAZEPAM 2 MG: 1 TABLET ORAL at 09:40

## 2019-03-03 RX ADMIN — SODIUM CHLORIDE, PRESERVATIVE FREE 10 ML: 5 INJECTION INTRAVENOUS at 09:42

## 2019-03-03 RX ADMIN — CLONAZEPAM 2 MG: 1 TABLET ORAL at 16:53

## 2019-03-03 RX ADMIN — HYDROCORTISONE SODIUM SUCCINATE 50 MG: 100 INJECTION, POWDER, FOR SOLUTION INTRAMUSCULAR; INTRAVENOUS at 11:50

## 2019-03-03 RX ADMIN — Medication 5 ML: at 09:00

## 2019-03-03 RX ADMIN — MEROPENEM 1 G: 1 INJECTION, POWDER, FOR SOLUTION INTRAVENOUS at 16:55

## 2019-03-03 RX ADMIN — MEROPENEM 1 G: 1 INJECTION, POWDER, FOR SOLUTION INTRAVENOUS at 11:51

## 2019-03-03 RX ADMIN — PROPOFOL 20 MCG/KG/MIN: 10 INJECTION, EMULSION INTRAVENOUS at 06:22

## 2019-03-03 RX ADMIN — IPRATROPIUM BROMIDE AND ALBUTEROL SULFATE 3 ML: .5; 3 SOLUTION RESPIRATORY (INHALATION) at 18:52

## 2019-03-03 RX ADMIN — VIGABATRIN 1000 MG: 500 POWDER, FOR SOLUTION ORAL at 16:54

## 2019-03-03 RX ADMIN — LEVOTHYROXINE SODIUM ANHYDROUS 50 MCG: 100 INJECTION, POWDER, LYOPHILIZED, FOR SOLUTION INTRAVENOUS at 11:51

## 2019-03-03 RX ADMIN — IPRATROPIUM BROMIDE AND ALBUTEROL SULFATE 3 ML: .5; 3 SOLUTION RESPIRATORY (INHALATION) at 00:10

## 2019-03-03 RX ADMIN — CLONAZEPAM 2 MG: 1 TABLET ORAL at 20:11

## 2019-03-03 RX ADMIN — CHLORHEXIDINE GLUCONATE 15 ML: 1.2 RINSE ORAL at 20:11

## 2019-03-03 RX ADMIN — Medication 5 ML: at 17:14

## 2019-03-03 RX ADMIN — Medication 1 CAPSULE: at 09:40

## 2019-03-03 RX ADMIN — Medication 5 ML: at 02:17

## 2019-03-03 RX ADMIN — HEPARIN SODIUM 5000 UNITS: 5000 INJECTION INTRAVENOUS; SUBCUTANEOUS at 05:14

## 2019-03-03 RX ADMIN — Medication 5 ML: at 20:03

## 2019-03-03 RX ADMIN — PANTOPRAZOLE SODIUM 40 MG: 40 INJECTION, POWDER, FOR SOLUTION INTRAVENOUS at 09:41

## 2019-03-03 RX ADMIN — SODIUM CHLORIDE, PRESERVATIVE FREE 3 ML: 5 INJECTION INTRAVENOUS at 20:03

## 2019-03-03 NOTE — PLAN OF CARE
Problem: Patient Care Overview  Goal: Plan of Care Review  Outcome: Ongoing (interventions implemented as appropriate)    Goal: Discharge Needs Assessment  Outcome: Ongoing (interventions implemented as appropriate)    Goal: Interprofessional Rounds/Family Conf  Outcome: Ongoing (interventions implemented as appropriate)      Problem: Skin Injury Risk (Adult)  Goal: Identify Related Risk Factors and Signs and Symptoms  Outcome: Ongoing (interventions implemented as appropriate)    Goal: Skin Health and Integrity  Outcome: Ongoing (interventions implemented as appropriate)      Problem: Ventilation, Mechanical Invasive (Adult)  Goal: Signs and Symptoms of Listed Potential Problems Will be Absent, Minimized or Managed (Ventilation, Mechanical Invasive)  Outcome: Ongoing (interventions implemented as appropriate)      Problem: Fall Risk (Adult)  Goal: Identify Related Risk Factors and Signs and Symptoms  Outcome: Ongoing (interventions implemented as appropriate)    Goal: Absence of Fall  Outcome: Ongoing (interventions implemented as appropriate)      Problem: Pneumonia (Adult)  Goal: Signs and Symptoms of Listed Potential Problems Will be Absent, Minimized or Managed (Pneumonia)  Outcome: Ongoing (interventions implemented as appropriate)

## 2019-03-03 NOTE — PLAN OF CARE
Problem: Patient Care Overview  Goal: Plan of Care Review  Outcome: Ongoing (interventions implemented as appropriate)   02/28/19 0025 03/02/19 2002   Coping/Psychosocial   Plan of Care Reviewed With --  family   Plan of Care Review   Progress no change --      Goal: Individualization and Mutuality  Outcome: Ongoing (interventions implemented as appropriate)    Goal: Discharge Needs Assessment  Outcome: Ongoing (interventions implemented as appropriate)   02/26/19 1808 02/27/19 1407   Discharge Needs Assessment   Patient/Family Anticipates Transition to --  home   Patient/Family Anticipated Services at Transition durable medical equipment --    Transportation Anticipated --  family or friend will provide   Equipment Needed After Discharge --  none   Outpatient/Agency/Support Group Needs --  homecare agency  (Pt utilizes [facility]. )   Discharge Facility/Level of Care Needs --  home with home health  ([facility] to be contacted at discharge and made aware. )   Disability   Equipment Currently Used at Home --  hospital bed;wheelchair;suction equipment;nebulizer;feeding device  (via [employer]. )     Goal: Interprofessional Rounds/Family Conf  Outcome: Ongoing (interventions implemented as appropriate)   02/28/19 0025   Interdisciplinary Rounds/Family Conf   Participants family;nursing;patient       Problem: Skin Injury Risk (Adult)  Goal: Identify Related Risk Factors and Signs and Symptoms  Outcome: Ongoing (interventions implemented as appropriate)   02/26/19 2138   Skin Injury Risk (Adult)   Related Risk Factors (Skin Injury Risk) critical care admission;mobility impaired     Goal: Skin Health and Integrity  Outcome: Ongoing (interventions implemented as appropriate)   03/02/19 2002   Skin Injury Risk (Adult)   Skin Health and Integrity making progress toward outcome       Problem: Ventilation, Mechanical Invasive (Adult)  Goal: Signs and Symptoms of Listed Potential Problems Will be  Absent, Minimized or Managed (Ventilation, Mechanical Invasive)  Outcome: Ongoing (interventions implemented as appropriate)   03/01/19 1410 03/02/19 2002   Goal/Outcome Evaluation   Problems Assessed (Mechanical Ventilation, Invasive) --  all   Problems Present (Mech Vent, Invasive) situational response --        Problem: Fall Risk (Adult)  Goal: Identify Related Risk Factors and Signs and Symptoms  Outcome: Ongoing (interventions implemented as appropriate)   02/28/19 0025   Fall Risk (Adult)   Related Risk Factors (Fall Risk) environment unfamiliar   Signs and Symptoms (Fall Risk) presence of risk factors     Goal: Absence of Fall  Outcome: Ongoing (interventions implemented as appropriate)   03/02/19 2002   Fall Risk (Adult)   Absence of Fall making progress toward outcome       Problem: Pneumonia (Adult)  Goal: Signs and Symptoms of Listed Potential Problems Will be Absent, Minimized or Managed (Pneumonia)  Outcome: Ongoing (interventions implemented as appropriate)   02/28/19 0025 03/02/19 2002   Goal/Outcome Evaluation   Problems Assessed (Pneumonia) --  all   Problems Present (Pneumonia) respiratory compromise --

## 2019-03-03 NOTE — PROGRESS NOTES
[facility] HOSPITALIST PROGRESS NOTE     Patient Identification:  Name:  Alyson Mercado  Age:  19 y.o.  Sex:  female  :  2000  MRN:  [id number]  Visit Number:  [id number]  Primary Care Provider:  William Zaldivar MD    Length of stay:  5    Subjective: Today's exam is assisted in the presence of Bela Pires RN.patient on ventilator, arousable, not in distress, still on pressor drip, current blood pressure is 92/40 with a heart rate of 120.  Admission note progress notes of Dr. Anderson noted.  Patient was admitted for increased lethargy and confusion was noted to have Dilantin toxicity and partial complex seizure, x-ray also has infiltrate mostly on the right upper lobe with possible aspiration.  She is also positive for influenza A.  Patient had bowel movement today.    Chief Complaint: Altered mental status  ----------------------------------------------------------------------------------------------------------------------  Current Hospital Meds:    chlorhexidine 15 mL Mouth/Throat Q12H   clonazePAM 2 mg Per J Tube TID   doxycycline 100 mg Intravenous Q12H   glycopyrrolate 0.5 mg Oral Q8H   heparin (porcine) 5,000 Units Subcutaneous Q8H   hydrocortisone sodium succinate 50 mg Intravenous Q6H   insulin aspart 0-9 Units Subcutaneous 4x Daily AC & at Bedtime   ipratropium-albuterol 3 mL Nebulization Q6H - RT   lacosamide 200 mg Per J Tube TID   lactobacillus acidophilus 1 capsule Oral Daily   levothyroxine sodium 50 mcg Intravenous Daily   nystatin susp + lidocaine viscous 5 mL Swish & Swallow 4x Daily   oseltamivir 75 mg Per G Tube Q12H   pantoprazole 40 mg Intravenous Q12H   sodium chloride 10 mL Intravenous Q12H   sodium chloride 3 mL Intravenous Q12H   Vigabatrin 1,000 mg Per PEG Tube TID       fentaNYL (SUBLIMAZE) PCA 1500 mcg/30 mL syringe     norepinephrine 0.02-0.3 mcg/kg/min Last Rate: 0.08 mcg/kg/min (19)   propofol 5-50 mcg/kg/min Last Rate: 20 mcg/kg/min  (03/03/19 0622)     ----------------------------------------------------------------------------------------------------------------------  Vital Signs:  Temp:  [98.8 °F (37.1 °C)-99.1 °F (37.3 °C)] 99.1 °F (37.3 °C)  Heart Rate:  [] 109  Resp:  [18-20] 18  BP: ()/() 105/60  FiO2 (%):  [55 %] 55 %       Tele: Sinus tachycardia rate of 126 bpm      02/26/19  1324 02/27/19  0457 03/03/19  0500   Weight: 40.8 kg (90 lb) 44.9 kg (98 lb 14.4 oz) 46.6 kg (102 lb 12.8 oz)     Body mass index is 31.37 kg/m².    Intake/Output Summary (Last 24 hours) at 3/3/2019 0838  Last data filed at 3/3/2019 0518  Gross per 24 hour   Intake 1241.15 ml   Output 1800 ml   Net -558.85 ml     NPO Diet  ----------------------------------------------------------------------------------------------------------------------  Physical exam:  General: Currently sedated but arousable, on ventilator, slightly tachypneic.    Skin:  Skin is warm and dry. No rash noted. No pallor.    HENT:  Head:  Normocephalic and atraumatic.  Mouth:  Moist mucous membranes.  ET tube in placeEyes:  Conjunctivae and EOM are normal.  Pupils are equal, round, and reactive to light.  No scleral icterus.    Neck:  Neck supple.  No JVD present.    Pulmonary/Chest:  No respiratory distress, increased breath sounds and rhonchi more prominent on the right hemothorax, equal chest expansion, no wheezing.  Cardiovascular: Tachycardic no murmur or gallop, normal heart sounds with no murmur.  Abdominal:  Soft.  Bowel sounds are normal.  Protuberant no guarding no rigidity mild distendedno tenderness.   Extremities: Edema in both upper and lower extremity +1, no cyanosis or clubbing.  Neurological: Mildly sedated but she opens her eyes.    Genitourinary: Woodward catheter in place with luly  urine    ----------------------------------------------------------------------------------------------------------------------  ----------------------------------------------------------------------------------------------------------------------  Results from last 7 days   Lab Units 02/26/19  1408   CK TOTAL U/L 37   TROPONIN I ng/mL 0.008     Results from last 7 days   Lab Units 03/03/19  0126 03/02/19  0256 03/01/19  1207  02/26/19  1408   CRP mg/dL  --   --   --   --  24.90*   LACTATE mmol/L  --   --   --   --  0.6   WBC 10*3/mm3 12.55* 12.70* 12.23   < > 15.65*   HEMOGLOBIN g/dL 11.3* 12.7 12.6   < > 15.4   HEMATOCRIT % 34.0* 37.2 36.8*   < > 43.7   MCV fL 98.3* 95.6* 96.6*   < > 96.7*   MCHC g/dL 33.2 34.1 34.2   < > 35.2   PLATELETS 10*3/mm3 170 188 175   < > 192    < > = values in this interval not displayed.     Results from last 7 days   Lab Units 03/01/19  1345   PH, ARTERIAL pH units 7.440   PO2 ART mm Hg 82.0   PCO2, ARTERIAL mm Hg 37.2   HCO3 ART mmol/L 24.7     Results from last 7 days   Lab Units 03/03/19  0126 03/02/19  0256 03/01/19  0350  02/28/19  0738  02/26/19  1408   SODIUM mmol/L 150 151 143  --   --    < > 129*   POTASSIUM mmol/L 4.3 2.7* 3.7   < > 2.6*   < > 4.5   MAGNESIUM mg/dL  --   --   --   --  1.6  --  2.5*   CHLORIDE mmol/L 116* 109 111  --   --    < > 92*   CO2 mmol/L 26.2 25.7 18.2*  --   --    < > 22.6*   BUN mg/dL 10 10 7  --   --    < > 32*   CREATININE mg/dL 0.27* 0.31* 0.37*  --   --    < > 0.94   EGFR IF NONAFRICN AM mL/min/1.73 >150 >150 >150  --   --    < > 76   CALCIUM mg/dL 7.6* 7.7 7.7  --   --    < > 8.3   GLUCOSE mg/dL 187* 243* 299*  --   --    < > 152*   ALBUMIN g/dL  --   --   --   --   --   --  3.50   BILIRUBIN mg/dL  --   --   --   --   --   --  0.1*   ALK PHOS U/L  --   --   --   --   --   --  196*   AST (SGOT) U/L  --   --   --   --   --   --  88*   ALT (SGPT) U/L  --   --   --   --   --   --  4*    < > = values in this interval not displayed.   Estimated  Creatinine Clearance: 199.4 mL/min (A) (by C-G formula based on SCr of 0.27 mg/dL (L)).    No results found for: AMMONIA      Blood Culture   Date Value Ref Range Status   02/28/2019 No growth at 2 days  Preliminary   02/26/2019 Gram positive cocci, consistent with Staph spp (A)  Preliminary   02/26/2019 No growth at 4 days  Preliminary     Urine Culture   Date Value Ref Range Status   02/26/2019 No growth  Final             I have personally looked at the labs and they are summarized above.  ----------------------------------------------------------------------------------------------------------------------  Imaging Results (last 24 hours)     ** No results found for the last 24 hours. **        ----------------------------------------------------------------------------------------------------------------------  Assessment and Plan:    -Septic shock present on admission from pneumonia  -Pneumonia right upper lobe suspicious for aspiration  -Cerebral palsy  -Complex partial seizure  -Dilantin toxicity  -Acute metabolic encephalopathy from sepsis  -Influenza A      Continue doxycycline, patient is allergic to Rocephin and vancomycin, will add Merrem to improve coverage for aspiration pneumonia, continue supportive care.  Patient so far is tolerating feeding well, pulmonary toilet.    Total time spent on this encounter is more than 30 minutes.    Modesta Hernandez MD  03/03/19  8:38 AM

## 2019-03-03 NOTE — PROGRESS NOTES
Nurse Practitioner - Brief Progress Note  PERMANENT  03/02/2019 20:03    Advanced ICU Care  [facility] - CCU - 10 - [place], [place] ([facility])    DAPHNEY QUEZADA    Date of Service 03/02/2019 20:03    HPI/Events of Note RN calling to report patient is having copious secretions. Takes Robinul 0.5 mg TID at home - orders placed to continue.      Interventions Minor-Communication with other healthcare providers and/or family, Routine modifications to care plan (e.g. PRN medications for pain, fever)        Electronically Signed by: Radha WilkersonNP) on 03/02/2019 20:05

## 2019-03-04 ENCOUNTER — APPOINTMENT (OUTPATIENT)
Dept: GENERAL RADIOLOGY | Facility: HOSPITAL | Age: 19
End: 2019-03-04

## 2019-03-04 PROBLEM — R78.81 BACTEREMIA: Status: ACTIVE | Noted: 2019-03-04

## 2019-03-04 PROBLEM — J18.9 BILATERAL PNEUMONIA: Status: ACTIVE | Noted: 2019-03-04

## 2019-03-04 PROBLEM — A41.9 SEPTIC SHOCK (HCC): Status: ACTIVE | Noted: 2019-03-04

## 2019-03-04 PROBLEM — R65.21 SEPTIC SHOCK (HCC): Status: ACTIVE | Noted: 2019-03-04

## 2019-03-04 LAB
A-A DO2: >300 MMHG (ref 0–300)
ANION GAP SERPL CALCULATED.3IONS-SCNC: 7.9 MMOL/L (ref 3.6–11.2)
ARTERIAL PATENCY WRIST A: POSITIVE
ATMOSPHERIC PRESS: 727 MMHG
BASE EXCESS BLDA CALC-SCNC: 3.3 MMOL/L
BDY SITE: ABNORMAL
BODY TEMPERATURE: 98.6 C
BUN BLD-MCNC: 12 MG/DL (ref 7–21)
BUN/CREAT SERPL: 75 (ref 7–25)
CALCIUM SPEC-SCNC: 8.2 MG/DL (ref 7.7–10)
CHLORIDE SERPL-SCNC: 111 MMOL/L (ref 99–112)
CO2 SERPL-SCNC: 29.1 MMOL/L (ref 24.3–31.9)
COHGB MFR BLD: 1.2 % (ref 0–5)
CREAT BLD-MCNC: 0.16 MG/DL (ref 0.43–1.29)
D-LACTATE SERPL-SCNC: 0.7 MMOL/L (ref 0.5–2)
DEPRECATED RDW RBC AUTO: 50.3 FL (ref 37–54)
ERYTHROCYTE [DISTWIDTH] IN BLOOD BY AUTOMATED COUNT: 13.5 % (ref 11.5–14.5)
GFR SERPL CREATININE-BSD FRML MDRD: >150 ML/MIN/1.73
GLUCOSE BLD-MCNC: 145 MG/DL (ref 70–110)
GLUCOSE BLDC GLUCOMTR-MCNC: 101 MG/DL (ref 70–130)
GLUCOSE BLDC GLUCOMTR-MCNC: 105 MG/DL (ref 70–130)
GLUCOSE BLDC GLUCOMTR-MCNC: 147 MG/DL (ref 70–130)
GLUCOSE BLDC GLUCOMTR-MCNC: 169 MG/DL (ref 70–130)
HCO3 BLDA-SCNC: 28.7 MMOL/L (ref 22–26)
HCT VFR BLD AUTO: 35.9 % (ref 37–47)
HCT VFR BLD CALC: 41 % (ref 37–47)
HGB BLD-MCNC: 11.4 G/DL (ref 12–16)
HGB BLDA-MCNC: 14.1 G/DL (ref 12–16)
HOROWITZ INDEX BLD+IHG-RTO: 65 %
HYPOCHROMIA BLD QL: ABNORMAL
LYMPHOCYTES # BLD MANUAL: 6.37 10*3/MM3 (ref 1–3)
LYMPHOCYTES NFR BLD MANUAL: 16 % (ref 0–10)
LYMPHOCYTES NFR BLD MANUAL: 28 % (ref 21–51)
MACROCYTES BLD QL SMEAR: ABNORMAL
MCH RBC QN AUTO: 32.8 PG (ref 27–33)
MCHC RBC AUTO-ENTMCNC: 31.8 G/DL (ref 33–37)
MCV RBC AUTO: 103.2 FL (ref 80–94)
METAMYELOCYTES NFR BLD MANUAL: 6 % (ref 0–0)
METHGB BLD QL: 0.3 % (ref 0–3)
MODALITY: ABNORMAL
MONOCYTES # BLD AUTO: 3.64 10*3/MM3 (ref 0.1–0.9)
NEUTROPHILS # BLD AUTO: 11.38 10*3/MM3 (ref 1.4–6.5)
NEUTROPHILS NFR BLD MANUAL: 42 % (ref 30–70)
NEUTS BAND NFR BLD MANUAL: 8 % (ref 4–12)
NRBC SPEC MANUAL: 2 /100 WBC (ref 0–0)
OSMOLALITY SERPL CALC.SUM OF ELEC: 296.6 MOSM/KG (ref 273–305)
OXYHGB MFR BLDV: 90.8 % (ref 85–100)
PCO2 BLDA: 46.6 MM HG (ref 35–45)
PEEP RESPIRATORY: 8 CM[H2O]
PH BLDA: 7.41 PH UNITS (ref 7.35–7.45)
PHENYTOIN SERPL-MCNC: 0.6 MCG/ML (ref 10–20)
PLAT MORPH BLD: NORMAL
PLATELET # BLD AUTO: 265 10*3/MM3 (ref 130–400)
PMV BLD AUTO: 12.1 FL (ref 6–10)
PO2 BLDA: 66.7 MM HG (ref 80–100)
POTASSIUM BLD-SCNC: 4.6 MMOL/L (ref 3.5–5.3)
RBC # BLD AUTO: 3.48 10*6/MM3 (ref 4.2–5.4)
SAO2 % BLDCOA: 92.2 % (ref 90–100)
SCAN SLIDE: NORMAL
SET MECH RESP RATE: 18
SODIUM BLD-SCNC: 148 MMOL/L (ref 135–153)
VENTILATOR MODE: ABNORMAL
VT ON VENT VENT: 330 ML
WBC NRBC COR # BLD: 22.76 10*3/MM3 (ref 4.5–12.5)

## 2019-03-04 PROCEDURE — 82805 BLOOD GASES W/O2 SATURATION: CPT | Performed by: HOSPITALIST

## 2019-03-04 PROCEDURE — 85007 BL SMEAR W/DIFF WBC COUNT: CPT | Performed by: HOSPITALIST

## 2019-03-04 PROCEDURE — 83050 HGB METHEMOGLOBIN QUAN: CPT | Performed by: HOSPITALIST

## 2019-03-04 PROCEDURE — 82962 GLUCOSE BLOOD TEST: CPT

## 2019-03-04 PROCEDURE — 71045 X-RAY EXAM CHEST 1 VIEW: CPT

## 2019-03-04 PROCEDURE — 25010000002 LORAZEPAM PER 2 MG

## 2019-03-04 PROCEDURE — 94003 VENT MGMT INPAT SUBQ DAY: CPT

## 2019-03-04 PROCEDURE — 82375 ASSAY CARBOXYHB QUANT: CPT | Performed by: HOSPITALIST

## 2019-03-04 PROCEDURE — 71045 X-RAY EXAM CHEST 1 VIEW: CPT | Performed by: RADIOLOGY

## 2019-03-04 PROCEDURE — 87070 CULTURE OTHR SPECIMN AEROBIC: CPT | Performed by: HOSPITALIST

## 2019-03-04 PROCEDURE — 94799 UNLISTED PULMONARY SVC/PX: CPT

## 2019-03-04 PROCEDURE — 25010000002 MEROPENEM

## 2019-03-04 PROCEDURE — 99291 CRITICAL CARE FIRST HOUR: CPT | Performed by: HOSPITALIST

## 2019-03-04 PROCEDURE — 25010000002 PROPOFOL 1000 MG/ML EMULSION: Performed by: PHYSICIAN ASSISTANT

## 2019-03-04 PROCEDURE — 25010000002 ALBUMIN HUMAN 5% PER 50 ML: Performed by: INTERNAL MEDICINE

## 2019-03-04 PROCEDURE — 87205 SMEAR GRAM STAIN: CPT | Performed by: HOSPITALIST

## 2019-03-04 PROCEDURE — 80185 ASSAY OF PHENYTOIN TOTAL: CPT | Performed by: HOSPITALIST

## 2019-03-04 PROCEDURE — 80048 BASIC METABOLIC PNL TOTAL CA: CPT | Performed by: HOSPITALIST

## 2019-03-04 PROCEDURE — 99291 CRITICAL CARE FIRST HOUR: CPT | Performed by: INTERNAL MEDICINE

## 2019-03-04 PROCEDURE — 83605 ASSAY OF LACTIC ACID: CPT | Performed by: INTERNAL MEDICINE

## 2019-03-04 PROCEDURE — P9041 ALBUMIN (HUMAN),5%, 50ML: HCPCS | Performed by: INTERNAL MEDICINE

## 2019-03-04 PROCEDURE — 63710000001 INSULIN ASPART PER 5 UNITS: Performed by: INTERNAL MEDICINE

## 2019-03-04 PROCEDURE — 25010000002 HEPARIN (PORCINE) PER 1000 UNITS: Performed by: INTERNAL MEDICINE

## 2019-03-04 PROCEDURE — 85025 COMPLETE CBC W/AUTO DIFF WBC: CPT | Performed by: HOSPITALIST

## 2019-03-04 PROCEDURE — 25010000002 METHYLPREDNISOLONE PER 40 MG: Performed by: HOSPITALIST

## 2019-03-04 PROCEDURE — 36600 WITHDRAWAL OF ARTERIAL BLOOD: CPT | Performed by: HOSPITALIST

## 2019-03-04 RX ORDER — LORAZEPAM 2 MG/ML
2 INJECTION INTRAMUSCULAR ONCE
Status: COMPLETED | OUTPATIENT
Start: 2019-03-04 | End: 2019-03-04

## 2019-03-04 RX ORDER — ALBUMIN, HUMAN INJ 5% 5 %
250 SOLUTION INTRAVENOUS
Status: COMPLETED | OUTPATIENT
Start: 2019-03-04 | End: 2019-03-04

## 2019-03-04 RX ORDER — LORAZEPAM 2 MG/ML
4 INJECTION INTRAMUSCULAR ONCE
Status: COMPLETED | OUTPATIENT
Start: 2019-03-04 | End: 2019-03-04

## 2019-03-04 RX ORDER — LORAZEPAM 2 MG/ML
INJECTION INTRAMUSCULAR
Status: COMPLETED
Start: 2019-03-04 | End: 2019-03-04

## 2019-03-04 RX ORDER — LORAZEPAM 2 MG/ML
2 INJECTION INTRAMUSCULAR EVERY 6 HOURS PRN
Status: DISCONTINUED | OUTPATIENT
Start: 2019-03-04 | End: 2019-03-07 | Stop reason: HOSPADM

## 2019-03-04 RX ORDER — LANSOPRAZOLE
30 KIT 2 TIMES DAILY
Status: DISCONTINUED | OUTPATIENT
Start: 2019-03-04 | End: 2019-03-07 | Stop reason: HOSPADM

## 2019-03-04 RX ADMIN — LACOSAMIDE 200 MG: 50 TABLET, FILM COATED ORAL at 16:16

## 2019-03-04 RX ADMIN — METHYLPREDNISOLONE SODIUM SUCCINATE 20 MG: 40 INJECTION, POWDER, FOR SOLUTION INTRAMUSCULAR; INTRAVENOUS at 18:02

## 2019-03-04 RX ADMIN — SODIUM CHLORIDE, PRESERVATIVE FREE 3 ML: 5 INJECTION INTRAVENOUS at 09:51

## 2019-03-04 RX ADMIN — MEROPENEM 1 G: 1 INJECTION, POWDER, FOR SOLUTION INTRAVENOUS at 02:12

## 2019-03-04 RX ADMIN — SODIUM CHLORIDE, PRESERVATIVE FREE 10 ML: 5 INJECTION INTRAVENOUS at 09:51

## 2019-03-04 RX ADMIN — DOXYCYCLINE 100 MG: 100 INJECTION, POWDER, LYOPHILIZED, FOR SOLUTION INTRAVENOUS at 23:22

## 2019-03-04 RX ADMIN — INSULIN ASPART 2 UNITS: 100 INJECTION, SOLUTION INTRAVENOUS; SUBCUTANEOUS at 21:03

## 2019-03-04 RX ADMIN — CLONAZEPAM 2 MG: 1 TABLET ORAL at 16:16

## 2019-03-04 RX ADMIN — IPRATROPIUM BROMIDE AND ALBUTEROL SULFATE 3 ML: .5; 3 SOLUTION RESPIRATORY (INHALATION) at 13:09

## 2019-03-04 RX ADMIN — LACOSAMIDE 200 MG: 50 TABLET, FILM COATED ORAL at 09:21

## 2019-03-04 RX ADMIN — Medication: at 20:01

## 2019-03-04 RX ADMIN — LANSOPRAZOLE 30 MG: KIT at 21:14

## 2019-03-04 RX ADMIN — IPRATROPIUM BROMIDE AND ALBUTEROL SULFATE 3 ML: .5; 3 SOLUTION RESPIRATORY (INHALATION) at 00:28

## 2019-03-04 RX ADMIN — HEPARIN SODIUM 5000 UNITS: 5000 INJECTION INTRAVENOUS; SUBCUTANEOUS at 21:00

## 2019-03-04 RX ADMIN — LORAZEPAM 4 MG: 2 INJECTION INTRAMUSCULAR at 05:32

## 2019-03-04 RX ADMIN — HEPARIN SODIUM 5000 UNITS: 5000 INJECTION INTRAVENOUS; SUBCUTANEOUS at 05:29

## 2019-03-04 RX ADMIN — HEPARIN SODIUM 5000 UNITS: 5000 INJECTION INTRAVENOUS; SUBCUTANEOUS at 14:49

## 2019-03-04 RX ADMIN — Medication 5 ML: at 21:03

## 2019-03-04 RX ADMIN — CHLORHEXIDINE GLUCONATE 15 ML: 1.2 RINSE ORAL at 21:03

## 2019-03-04 RX ADMIN — CLONAZEPAM 2 MG: 1 TABLET ORAL at 21:01

## 2019-03-04 RX ADMIN — VIGABATRIN 1000 MG: 500 POWDER, FOR SOLUTION ORAL at 16:17

## 2019-03-04 RX ADMIN — GLYCOPYRROLATE 0.5 MG: 1 TABLET ORAL at 14:49

## 2019-03-04 RX ADMIN — VIGABATRIN 1000 MG: 500 POWDER, FOR SOLUTION ORAL at 09:49

## 2019-03-04 RX ADMIN — LORAZEPAM 2 MG: 2 INJECTION INTRAMUSCULAR; INTRAVENOUS at 16:05

## 2019-03-04 RX ADMIN — DOXYCYCLINE 100 MG: 100 INJECTION, POWDER, LYOPHILIZED, FOR SOLUTION INTRAVENOUS at 10:49

## 2019-03-04 RX ADMIN — Medication 1 CAPSULE: at 09:21

## 2019-03-04 RX ADMIN — MEROPENEM 1 G: 1 INJECTION, POWDER, FOR SOLUTION INTRAVENOUS at 09:52

## 2019-03-04 RX ADMIN — MEROPENEM 1 G: 1 INJECTION, POWDER, FOR SOLUTION INTRAVENOUS at 18:01

## 2019-03-04 RX ADMIN — LORAZEPAM 2 MG: 2 INJECTION INTRAMUSCULAR at 16:05

## 2019-03-04 RX ADMIN — IPRATROPIUM BROMIDE AND ALBUTEROL SULFATE 3 ML: .5; 3 SOLUTION RESPIRATORY (INHALATION) at 06:55

## 2019-03-04 RX ADMIN — LACOSAMIDE 200 MG: 50 TABLET, FILM COATED ORAL at 21:01

## 2019-03-04 RX ADMIN — METHYLPREDNISOLONE SODIUM SUCCINATE 20 MG: 40 INJECTION, POWDER, FOR SOLUTION INTRAMUSCULAR; INTRAVENOUS at 09:21

## 2019-03-04 RX ADMIN — PROPOFOL 5 MCG/KG/MIN: 10 INJECTION, EMULSION INTRAVENOUS at 02:14

## 2019-03-04 RX ADMIN — SODIUM CHLORIDE, PRESERVATIVE FREE 10 ML: 5 INJECTION INTRAVENOUS at 21:01

## 2019-03-04 RX ADMIN — ALBUMIN HUMAN 250 ML: 0.05 INJECTION, SOLUTION INTRAVENOUS at 12:20

## 2019-03-04 RX ADMIN — IPRATROPIUM BROMIDE AND ALBUTEROL SULFATE 3 ML: .5; 3 SOLUTION RESPIRATORY (INHALATION) at 18:41

## 2019-03-04 RX ADMIN — ALBUMIN HUMAN 250 ML: 0.05 INJECTION, SOLUTION INTRAVENOUS at 12:21

## 2019-03-04 RX ADMIN — METHYLPREDNISOLONE SODIUM SUCCINATE 20 MG: 40 INJECTION, POWDER, FOR SOLUTION INTRAMUSCULAR; INTRAVENOUS at 02:12

## 2019-03-04 RX ADMIN — LEVOTHYROXINE SODIUM ANHYDROUS 50 MCG: 100 INJECTION, POWDER, LYOPHILIZED, FOR SOLUTION INTRAVENOUS at 10:50

## 2019-03-04 RX ADMIN — VIGABATRIN 1000 MG: 500 POWDER, FOR SOLUTION ORAL at 21:14

## 2019-03-04 RX ADMIN — PROPOFOL 20 MCG/KG/MIN: 10 INJECTION, EMULSION INTRAVENOUS at 19:07

## 2019-03-04 RX ADMIN — LORAZEPAM 4 MG: 2 INJECTION INTRAMUSCULAR; INTRAVENOUS at 05:32

## 2019-03-04 RX ADMIN — CHLORHEXIDINE GLUCONATE 15 ML: 1.2 RINSE ORAL at 09:52

## 2019-03-04 RX ADMIN — CLONAZEPAM 2 MG: 1 TABLET ORAL at 09:21

## 2019-03-04 RX ADMIN — GLYCOPYRROLATE 0.5 MG: 1 TABLET ORAL at 05:30

## 2019-03-04 RX ADMIN — Medication 5 ML: at 12:20

## 2019-03-04 RX ADMIN — Medication: at 00:48

## 2019-03-04 RX ADMIN — Medication 5 ML: at 18:02

## 2019-03-04 RX ADMIN — SODIUM CHLORIDE, PRESERVATIVE FREE 3 ML: 5 INJECTION INTRAVENOUS at 21:03

## 2019-03-04 RX ADMIN — GLYCOPYRROLATE 0.5 MG: 1 TABLET ORAL at 21:01

## 2019-03-04 RX ADMIN — PANTOPRAZOLE SODIUM 40 MG: 40 INJECTION, POWDER, FOR SOLUTION INTRAVENOUS at 09:22

## 2019-03-04 NOTE — PLAN OF CARE
Problem: Patient Care Overview  Goal: Plan of Care Review  Outcome: Ongoing (interventions implemented as appropriate)   03/03/19 0200 03/03/19 1943   Coping/Psychosocial   Plan of Care Reviewed With grandparent --    Plan of Care Review   Progress --  no change     Goal: Individualization and Mutuality  Outcome: Ongoing (interventions implemented as appropriate)    Goal: Discharge Needs Assessment  Outcome: Ongoing (interventions implemented as appropriate)   02/26/19 1808 02/27/19 1407   Discharge Needs Assessment   Patient/Family Anticipates Transition to --  home   Patient/Family Anticipated Services at Transition durable medical equipment --    Transportation Anticipated --  family or friend will provide   Equipment Needed After Discharge --  none   Outpatient/Agency/Support Group Needs --  homecare agency  (Pt utilizes [facility]. )   Discharge Facility/Level of Care Needs --  home with home health  ([facility] to be contacted at discharge and made aware. )   Disability   Equipment Currently Used at Home --  hospital bed;wheelchair;suction equipment;nebulizer;feeding device  (via [employer]. )     Goal: Interprofessional Rounds/Family Conf  Outcome: Ongoing (interventions implemented as appropriate)   03/03/19 1943   Interdisciplinary Rounds/Family Conf   Participants family;nursing       Problem: Skin Injury Risk (Adult)  Goal: Identify Related Risk Factors and Signs and Symptoms  Outcome: Ongoing (interventions implemented as appropriate)   02/26/19 2138   Skin Injury Risk (Adult)   Related Risk Factors (Skin Injury Risk) critical care admission;mobility impaired     Goal: Skin Health and Integrity  Outcome: Ongoing (interventions implemented as appropriate)   03/03/19 1943   Skin Injury Risk (Adult)   Skin Health and Integrity making progress toward outcome       Problem: Ventilation, Mechanical Invasive (Adult)  Goal: Signs and Symptoms of Listed Potential Problems Will be Absent,  Minimized or Managed (Ventilation, Mechanical Invasive)  Outcome: Ongoing (interventions implemented as appropriate)   03/03/19 1943   Goal/Outcome Evaluation   Problems Assessed (Mechanical Ventilation, Invasive) all   Problems Present (Mech Vent, Invasive) situational response       Problem: Fall Risk (Adult)  Goal: Identify Related Risk Factors and Signs and Symptoms  Outcome: Ongoing (interventions implemented as appropriate)   02/28/19 0025   Fall Risk (Adult)   Related Risk Factors (Fall Risk) environment unfamiliar   Signs and Symptoms (Fall Risk) presence of risk factors     Goal: Absence of Fall  Outcome: Ongoing (interventions implemented as appropriate)   03/03/19 1943   Fall Risk (Adult)   Absence of Fall making progress toward outcome       Problem: Pneumonia (Adult)  Goal: Signs and Symptoms of Listed Potential Problems Will be Absent, Minimized or Managed (Pneumonia)  Outcome: Ongoing (interventions implemented as appropriate)   03/03/19 1943   Goal/Outcome Evaluation   Problems Assessed (Pneumonia) all   Problems Present (Pneumonia) respiratory compromise

## 2019-03-04 NOTE — PROGRESS NOTES
Discharge Planning Assessment  MARICEL Gross     Patient Name: Alyson Mercado  MRN: [id number]  Today's Date: 3/4/2019    Admit Date: 2/26/2019      Discharge Plan     Row Name 03/04/19 1113       Plan    Plan  SS received consult on this date for [facility]. SS spoke with [facility] gustavo Navarro to inform of the consult. SS will follow.     Patient/Family in Agreement with Plan  yes          Expected Discharge Date and Time     Expected Discharge Date Expected Discharge Time    Mar 3, 2019             Chata Herman

## 2019-03-04 NOTE — PROGRESS NOTES
LOS: 6 days   Patient Care Team:  William Zaldivar MD as PCP - General (Family Medicine)    Chief Complaint:  Pulmonology is following for septic shock related to community acquire pneumonia, influenza A positive, acute hypoxic respiratory failure requiring mechanical ventilation.           Subjective     Interval History: Ms. Mercado is intubated and sedated.  She is on ACVC mode of a tidal volume of 330, rate of 18, FiO2 65% and a PEEP of 8.  ABG this morning shows a pH of 7.407, PCO2 46, PO2 of 66 and a bicarb of 28.  She remains on Levophed for hypotension.  And there is a cooling blanket in place as she has been febrile.  She is positive for influenza and leukocytosis was noted to be worsening.  Current blood pressure is 125/96 with a map of 105, heart rate is a sinus rhythm with a rate of 68 and she is saturating 100% on the ventilator.    History taken from: chart RN Patient unable to give history due to patient intubated.    Review of Systems:   Review of Systems - History obtained from chart review and unobtainable from patient due to mental status and Intubated      Objective     Vital Signs  Temp:  [97.8 °F (36.6 °C)-99.9 °F (37.7 °C)] 98.4 °F (36.9 °C)  Heart Rate:  [] 93  Resp:  [18-23] 20  BP: ()/() 115/85  FiO2 (%):  [65 %] 65 %  Body mass index is 30.52 kg/m².    Intake/Output Summary (Last 24 hours) at 3/4/2019 1515  Last data filed at 3/4/2019 1221  Gross per 24 hour   Intake 2004.41 ml   Output 400 ml   Net 1604.41 ml     I/O this shift:  In: 200 [IV Piggyback:200]  Out: 300 [Urine:300]    Physical Exam:  GENERAL APPEARANCE: Intubated and sedated.  Appears to be resting comfortably in bed.     HEAD: normocephalic. Atruamatic    EYES: PERRLA. EOMI    THROAT: ET tube in place. Oral cavity and pharynx normal. No inflammation, swelling, exudate, or lesions.     NECK: Neck supple. No thryomegaly    CARDIAC: Normal S1 and S2. No S3, S4 or murmurs. Rhythm is regular. There is no  peripheral edema, cyanosis or pallor. Extremities are warm and well perfused. Capillary refill is less than 2 seconds. No carotid bruits.    RESPIRATORY: Bilateral air entry positive. Bilaterally diminished breath sounds.  No wheezing, crackles or rhonchi noted.    GI: Positive bowel sounds. Soft, nondistended, nontender.     MUSCULOSKELETAL: No significant deformity or joint abnormality. No edema. Peripheral pulses intact.    NEUROLOGICAL: Unable to assess due to sedation status.     PSYCHIATRIC: Unable to assess due to sedation status.     Results Review:     I reviewed the patient's new clinical results.  I reviewed the patient's new imaging results and agree with the interpretation.  Results from last 7 days   Lab Units 03/04/19  0251 03/03/19  0126 03/02/19  0256   WBC 10*3/mm3 22.76* 12.55* 12.70*   HEMOGLOBIN g/dL 11.4* 11.3* 12.7   PLATELETS 10*3/mm3 265 170 188     Results from last 7 days   Lab Units 03/04/19  0251 03/03/19  0126 03/02/19  0256  02/28/19  0738  02/26/19  1408   SODIUM mmol/L 148 150 151   < >  --    < > 129*   POTASSIUM mmol/L 4.6 4.3 2.7*   < > 2.6*   < > 4.5   CHLORIDE mmol/L 111 116* 109   < >  --    < > 92*   CO2 mmol/L 29.1 26.2 25.7   < >  --    < > 22.6*   BUN mg/dL 12 10 10   < >  --    < > 32*   CREATININE mg/dL 0.16* 0.27* 0.31*   < >  --    < > 0.94   CALCIUM mg/dL 8.2 7.6* 7.7   < >  --    < > 8.3   GLUCOSE mg/dL 145* 187* 243*   < >  --    < > 152*   MAGNESIUM mg/dL  --   --   --   --  1.6  --  2.5*    < > = values in this interval not displayed.     No results found for: INR, PROTIME  Results from last 7 days   Lab Units 02/26/19  1408   ALK PHOS U/L 196*   BILIRUBIN mg/dL 0.1*   ALT (SGPT) U/L 4*   AST (SGOT) U/L 88*     Results from last 7 days   Lab Units 03/04/19  0501   PH, ARTERIAL pH units 7.407   PO2 ART mm Hg 66.7*   PCO2, ARTERIAL mm Hg 46.6*   HCO3 ART mmol/L 28.7*     Imaging Results (last 24 hours)     Procedure Component Value Units Date/Time    XR Chest 1 View  [[id number]] Collected:  03/04/19 1053     Updated:  03/04/19 1057    Narrative:       EXAMINATION: XR CHEST 1 VW-      CLINICAL INDICATION:     sob; J96.01-Acute respiratory failure with  hypoxia; I95.9-Hypotension, unspecified; A41.9-Sepsis, unspecified  organism; J18.9-Pneumonia, unspecified organism; N30.00-Acute cystitis  without hematuria     TECHNIQUE:  XR CHEST 1 VW-      COMPARISON: 3/1/2019      FINDINGS:   ETT with tip just above clavicles.  Left Port-A-Cath with tip in the SVC  region.     Mild left greater than right basilar airspace disease.  Cardiomegaly.   No pneumothorax.  No acute bony findings.       Impression:       1. ETT with tip above clavicles.  2. Basilar airspace disease.  Cardiomegaly.     This report was finalized on 3/4/2019 10:55 AM by Dr. Matthew Juárez MD.                Medication Review:   Scheduled Medications:    chlorhexidine 15 mL Mouth/Throat Q12H   clonazePAM 2 mg Per J Tube TID   doxycycline 100 mg Intravenous Q12H   glycopyrrolate 0.5 mg Oral Q8H   heparin (porcine) 5,000 Units Subcutaneous Q8H   insulin aspart 0-9 Units Subcutaneous 4x Daily AC & at Bedtime   ipratropium-albuterol 3 mL Nebulization Q6H - RT   lacosamide 200 mg Per J Tube TID   lactobacillus acidophilus 1 capsule Oral Daily   levothyroxine sodium 50 mcg Intravenous Daily   meropenem 1 g Intravenous Q8H   methylPREDNISolone sodium succinate 20 mg Intravenous Q8H   nystatin susp + lidocaine viscous 5 mL Swish & Swallow 4x Daily   pantoprazole 40 mg Intravenous Q12H   sodium chloride 10 mL Intravenous Q12H   sodium chloride 3 mL Intravenous Q12H   Vigabatrin 1,000 mg Per PEG Tube TID     Continuous infusions:    fentaNYL (SUBLIMAZE) PCA 1500 mcg/30 mL syringe     norepinephrine 0.02-0.3 mcg/kg/min Last Rate: Stopped (03/04/19 1323)   propofol 5-50 mcg/kg/min Last Rate: 5 mcg/kg/min (03/04/19 0214)       Assessment/Plan       Assessment:  - Acute hypoxic and hypercarbic respiratory failure, requiring mechanical  ventilation  - Mechanical ventilator dependence  - Septic shock  - Anoxic/hypoxic, metabolic encephalopathy versus status epilepticus  - Right upper lobe pneumonia  - Influenza type A positive        Central nervous system:  Intubated and sedated.   EE2019. abnormal study.  Supportive diffuse cortical dysfunction, as seen in toxic metabolic encephalopathic states and evidence of partial onset seizure.   Plan   - Pain management: fentanyl drip.   - Sedation: Propofol , RASS goal: 0 to -1  - Patient does not have biot's type of breathing pattern while on opioids.   - I highly recommend avoiding nighttime disturbances and light exposure during night to maintain normal circadian rhythms to avoid hypoactive or hyperactive delirium  - Continue with clonazepam        Cardiac/aorta  Vasopressor: norepinephrine.   Plan:  - Goal mean arterial pressure is greater than 65 mmHg  -Solu-Cortef was changed to Solu-Medrol IV twice daily.  -Ordered lactate level        Respiratory:  ACVC mode of 18, 330, 65%, 8.   Chest x-ray: reviewed.    ET tube location: Within 5 cm from the celeste.  Plateau pressure: 28  Peak pressure: 32  No auto PEEP or leak noted  Plan   - I have reviewed the ventilator graphics.  Patient is synchronous with mechanical ventilator.  There is no auto PEEP or leak appreciated.  - I'm keeping tidal volume equal to 6 ML per KG ideal body weight.  - Mechanical ventilators were changed to keep Plateau pressure less than 30 cm H20  - Goal SPO2 greater than 90%.  - I have reviewed the ABG.    No vent settings were changed.  -Ordered ABG  -Order chest x-ray  - Continue azithromycin, doxycyline, DuoNeb, Tamiflu.   -Palliative care consulted.  -Order albumin IV     Gastrointestinal/Liver  CT abdomen and pelvis: 19. Thickening of the distal descending and sigmoid colon wall. Nonobstructing stones in both kidneys. Scoliosis.   Route of feeding: PEG tube.   Plan:  - Continue with PPI for ulcer prophylaxis.  -  Continue with lactobacillus  - Nutrition team on board.  Appreciate nutrition team recommendations        Genitorenal:  Input/Output:100 ml in 24 hours  Plan   - I will avoid nephrotoxic agents  - Pharmacy is on board for dosages of medication based on creatinine clearance.  Appreciate pharmacy recommendations.  - I will replete serum electrolytes as per ICU electrolyte replacement protocol.        Endocrine   Glucose trending upwards.   Plan   - Goal serum glucose level is 180 mg/dL while in ICU.  I strongly recommend starting insulin drip if 2 consecutive serum glucose levels are greater than 200 mg/dL.   - Continue insulin for glycemic control         Infectious disease   Leukocytosis is worsening  Influenza type A positive  Blood cultures pending, currently negative  MSRA negative.   Plan   - Current Merrem, Doxycycline, and Tamiflu        Heme/Onc   Globin and platelets are stable.  Plan   - I recommend PRBC transfusion if hemoglobin is less than 7 g/dL unless active bleeding or cardiac ischemia.  - Continue with heparin subcutaneous for DVT prophylaxis.  - Hold chemical anticoagulation if platelet count is less than 50,000.        Musculoskeletal   Plan   - Turn the patient every 2 hours to prevent pressure ulcers.        Activity   - I recommend aggressive PTOT while in hospital to avoid critical care neuropathy/myopathy.        Lines:   Single Lumen Implantable Port Left Subclavian  --  Peripheral IV 02/27/19 1408 Anterior;Left;Upper Arm  5 days  Gastrostomy/Enterostomy Gastrostomy-jejunostomy  --  Urethral Catheter Silicone 16 Fr.  6 days  ETT   6 day        Quality measure:  1. Elevation of the head of the bed to 30-45 degrees- yes  2. Daily sedation vacation and daily assessment of readiness to extubate-yes  3. Peptic ulcer disease prophylaxis- yes  4. Deep venous thrombosis prophylaxis- yes (chemical)  5. Indwelling urinary catheter - required for accurate urine output.        Code status: No CPR,  cardioversion/defibrillation.         Emergency contacts listed as Damaris Mercado (mother).              Patient Active Problem List   Diagnosis Code   • Acute respiratory failure with hypoxia (CMS/HCC) J96.01         Bedside rounds were completed with RN and RRT, discussed case and plan in great detail.      ALISON Vences  03/04/19  3:15 PM        Attestation: Scribed for Dr. Skaggs, Shayy Prajapati, ALISON    The clinical plan was discussed extensively with the nurse, and respiratory therapist.     Critical Care time spent in direct patient care: 32 minutes (excluding procedure time) including high complexity decision making to assess, and support vital organ system failure in this individual who has impairment of one or more vital organ systems such that there is a high probability of imminent or life threatening deterioration in the patient’s condition.     Patient is critically ill and is at higher risk for further mortality/morbidity. Continue ICU care.        I, Billy Skaggs M.D. attest that the above note accurately reflects the work and decisions made by me. Patient was seen and evaluated by me, including history of present illness, physical exam, assessment, and treatment plan.  The above note was reviewed and edited by me.        Billy Skaggs M.D  Pulmonary and Critical Care Medicine

## 2019-03-04 NOTE — CONSULTS
INFECTIOUS DISEASE CONSULTATION REPORT        Patient Identification:  Name:  Alyson Mercado  Age:  19 y.o.  Sex:  female  :  2000  MRN:  [id number]   Visit Number:  [id number]  Primary Care Physician:  William Zaldivar MD         Subjective       Subjective     History of present illness:      Thank you Dr. Barnhart for allowing us to participate in the care of your patient.  As you well know, Ms. Alyson Mercado is a 19 y.o. female with past medical history significant for Cerebral Palsy, chronic hypoxemic respiratory failure on home oxygen, seizure disorder, who presented to [facility] Emergency Department on 2019 for unresponsiveness and respiratory failure. WBC worsening at 22.76. CRP 24.9. Urine culture from 19 finalized as no growth. C Difficile negative. Influenza A positive. Chest x-ray from 3/4/2019 revealed bilateral airspace disease, right greater than left. Blood culture from 19 1 out of 2 sets positive for Coag negative Staphylococcus. MRSA nasal swab negative.       Infectious Disease consultation was requested for antimicrobial management.      ---------------------------------------------------------------------------------------------------------------------     Review Of Systems:    Unable to obtain. Intubated and sedated.   ---------------------------------------------------------------------------------------------------------------------     Past Medical History    Past Medical History:   Diagnosis Date   • Seizures (CMS/HCC)        Past Surgical History    No past surgical history on file.    Family History    No family history on file.      Social History    Social History     Tobacco Use   • Smoking status: Never Smoker   • Smokeless tobacco: Never Used   Substance Use Topics   • Alcohol use: No   • Drug use: Not on file       Allergies    Rocephin [ceftriaxone]; Vancomycin; and  Dextrose  ---------------------------------------------------------------------------------------------------------------------     Home Medications:    Prior to Admission Medications     Prescriptions Last Dose Informant Patient Reported? Taking?    baclofen (LIORESAL) 20 MG tablet 2/26/2019 Pharmacy Yes Yes    30 mg by Per J Tube route 3 (Three) Times a Day.    Cannabidiol (EPIDIOLEX) 100 MG/ML solution 2/26/2019 Medication Bottle Yes Yes    1 mL by Per J Tube route 2 (Two) Times a Day.    cetirizine (zyrTEC) 10 MG tablet 2/25/2019 Pharmacy Yes Yes    10 mg by Per J Tube route Every Night.    cholecalciferol (VITAMIN D3) 1000 units tablet 2/26/2019 Medication Bottle Yes Yes    Take 1,000 Units by mouth 4 (Four) Times a Day.    clonazePAM (KlonoPIN) 2 MG tablet 2/26/2019 Pharmacy Yes Yes    2 mg by Per J Tube route 3 (Three) Times a Day.    furosemide (LASIX) 20 MG tablet 2/25/2019 Pharmacy Yes Yes    20 mg by Per J Tube route Daily.    lacosamide (VIMPAT) 100 MG tablet tablet 2/26/2019 Pharmacy Yes Yes    200 mg by Per J Tube route 3 (Three) Times a Day.    montelukast (SINGULAIR) 5 MG chewable tablet 2/25/2019 Pharmacy Yes Yes    5 mg by Per J Tube route Every Night.    pantoprazole (PROTONIX) 40 MG EC tablet 2/25/2019 Pharmacy Yes Yes    Take 40 mg by mouth Every Night.    phenytoin (DILANTIN) 50 MG chewable tablet 2/26/2019 Pharmacy Yes Yes    125 mg by Per J Tube route 3 (Three) Times a Day.    potassium chloride (KAYCIEL) 20 MEQ/15ML (10%) solution 2/25/2019 Medication Bottle Yes Yes    7.5 mEq by Per J Tube route Daily.    Taurine 500 MG capsule 2/26/2019  Yes Yes    500 mg by Per J Tube route 2 (Two) Times a Day.    Vigabatrin (SABRIL) 500 MG pack 2/26/2019 Medication Bottle Yes Yes    1,000 mg by Per J Tube route 3 (Three) Times a Day. Give 2 packets in 20mls of water three times a day.          ---------------------------------------------------------------------------------------------------------------------    Objective       Objective     Hospital Scheduled Meds:    chlorhexidine 15 mL Mouth/Throat Q12H   clonazePAM 2 mg Per J Tube TID   doxycycline 100 mg Intravenous Q12H   glycopyrrolate 0.5 mg Oral Q8H   heparin (porcine) 5,000 Units Subcutaneous Q8H   insulin aspart 0-9 Units Subcutaneous 4x Daily AC & at Bedtime   ipratropium-albuterol 3 mL Nebulization Q6H - RT   lacosamide 200 mg Per J Tube TID   lactobacillus acidophilus 1 capsule Oral Daily   levothyroxine sodium 50 mcg Intravenous Daily   meropenem 1 g Intravenous Q8H   methylPREDNISolone sodium succinate 20 mg Intravenous Q8H   nystatin susp + lidocaine viscous 5 mL Swish & Swallow 4x Daily   pantoprazole 40 mg Intravenous Q12H   sodium chloride 10 mL Intravenous Q12H   sodium chloride 3 mL Intravenous Q12H   Vigabatrin 1,000 mg Per PEG Tube TID       fentaNYL (SUBLIMAZE) PCA 1500 mcg/30 mL syringe     norepinephrine 0.02-0.3 mcg/kg/min Last Rate: 0.04 mcg/kg/min (03/04/19 1052)   propofol 5-50 mcg/kg/min Last Rate: 5 mcg/kg/min (03/04/19 0214)     ---------------------------------------------------------------------------------------------------------------------   Vital Signs:  Temp:  [97.8 °F (36.6 °C)-99.9 °F (37.7 °C)] 99.2 °F (37.3 °C)  Heart Rate:  [] 109  Resp:  [18-23] 23  BP: ()/(53-95) 118/78  FiO2 (%):  [55 %-65 %] 65 %  Mean Arterial Pressure (Non-Invasive) for the past 24 hrs (Last 3 readings):   Noninvasive MAP (mmHg)   03/04/19 1200 89   03/04/19 1145 96   03/04/19 1130 87     SpO2 Percentage    03/04/19 0905 03/04/19 1015 03/04/19 1120   SpO2: 98% 99% 98%     SpO2:  [79 %-100 %] 98 %  on   ;   Device (Oxygen Therapy): ventilator    Body mass index is 30.52 kg/m².  Wt Readings from Last 3 Encounters:   03/04/19 45.4 kg (100 lb)      ---------------------------------------------------------------------------------------------------------------------     Physical Exam:    Constitutional:  Intubated and sedated.   HENT:  Head: Normocephalic and atraumatic.  Mouth:  Moist mucous membranes.    Eyes:  Conjunctivae and EOM are normal.  No scleral icterus.  Neck:  Neck supple.  No JVD present.    Cardiovascular:  Normal rate, regular rhythm and normal heart sounds with no murmur. No edema.  Pulmonary/Chest: Bilateral scattered rhonchi.   Abdominal:  Soft.  Bowel sounds are normal.  No distension and no tenderness.   Musculoskeletal:  No edema, no tenderness, and no deformity.  No swelling or redness of joints.  Neurological:  Intubated and sedated.   Skin:  Skin is warm and dry.  No rash noted.  No pallor.   Psychiatric:   Intubated and sedated.     ---------------------------------------------------------------------------------------------------------------------    Results from last 7 days   Lab Units 02/26/19  1408   CK TOTAL U/L 37   TROPONIN I ng/mL 0.008         Results from last 7 days   Lab Units 03/04/19  0501   PH, ARTERIAL pH units 7.407   PO2 ART mm Hg 66.7*   PCO2, ARTERIAL mm Hg 46.6*   HCO3 ART mmol/L 28.7*     Results from last 7 days   Lab Units 03/04/19  1124 03/04/19  0251 03/03/19  0126 03/02/19 0256 02/26/19  1408   CRP mg/dL  --   --   --   --   --  24.90*   LACTATE mmol/L 0.7  --   --   --   --  0.6   WBC 10*3/mm3  --  22.76* 12.55* 12.70*   < > 15.65*   HEMOGLOBIN g/dL  --  11.4* 11.3* 12.7   < > 15.4   HEMATOCRIT %  --  35.9* 34.0* 37.2   < > 43.7   MCV fL  --  103.2* 98.3* 95.6*   < > 96.7*   MCHC g/dL  --  31.8* 33.2 34.1   < > 35.2   PLATELETS 10*3/mm3  --  265 170 188   < > 192    < > = values in this interval not displayed.     Results from last 7 days   Lab Units 03/04/19  0251 03/03/19  0126 03/02/19  0256  02/28/19  0738  02/26/19  1408   SODIUM mmol/L 148 150 151   < >  --    < > 129*   POTASSIUM mmol/L 4.6 4.3 2.7*    < > 2.6*   < > 4.5   MAGNESIUM mg/dL  --   --   --   --  1.6  --  2.5*   CHLORIDE mmol/L 111 116* 109   < >  --    < > 92*   CO2 mmol/L 29.1 26.2 25.7   < >  --    < > 22.6*   BUN mg/dL 12 10 10   < >  --    < > 32*   CREATININE mg/dL 0.16* 0.27* 0.31*   < >  --    < > 0.94   EGFR IF NONAFRICN AM mL/min/1.73 >150 >150 >150   < >  --    < > 76   CALCIUM mg/dL 8.2 7.6* 7.7   < >  --    < > 8.3   GLUCOSE mg/dL 145* 187* 243*   < >  --    < > 152*   ALBUMIN g/dL  --   --   --   --   --   --  3.50   BILIRUBIN mg/dL  --   --   --   --   --   --  0.1*   ALK PHOS U/L  --   --   --   --   --   --  196*   AST (SGOT) U/L  --   --   --   --   --   --  88*   ALT (SGPT) U/L  --   --   --   --   --   --  4*    < > = values in this interval not displayed.   Estimated Creatinine Clearance: 332 mL/min (A) (by C-G formula based on SCr of 0.16 mg/dL (L)).  No results found for: AMMONIA    Glucose   Date/Time Value Ref Range Status   03/04/2019 1100 101 70 - 130 mg/dL Final   03/04/2019 0610 147 (H) 70 - 130 mg/dL Final   03/03/2019 2038 131 (H) 70 - 130 mg/dL Final   03/03/2019 1702 161 (H) 70 - 130 mg/dL Final   03/03/2019 1159 124 70 - 130 mg/dL Final   03/03/2019 0609 139 (H) 70 - 130 mg/dL Final   03/02/2019 2042 213 (H) 70 - 130 mg/dL Final   03/02/2019 1705 208 (H) 70 - 130 mg/dL Final     No results found for: HGBA1C  Lab Results   Component Value Date    TSH 0.428 (L) 02/26/2019    FREET4 0.66 (L) 02/26/2019       Blood Culture   Date Value Ref Range Status   02/28/2019 No growth at 3 days  Preliminary   02/26/2019 Staphylococcus, coagulase negative (A)  Final     Comment:     Probable Contaminant.     02/26/2019 No growth at 5 days  Final     Urine Culture   Date Value Ref Range Status   02/26/2019 No growth  Final              Pain Management Panel     Pain Management Panel Latest Ref Rng & Units 2/26/2019 6/3/2015    CREATININE UR mg/dL 83.6 22.1        I have personally reviewed the above laboratory results.    ---------------------------------------------------------------------------------------------------------------------  Imaging Results (last 7 days)     Procedure Component Value Units Date/Time    XR Chest 1 View [[id number]] Collected:  03/04/19 1053     Updated:  03/04/19 1057    Narrative:       EXAMINATION: XR CHEST 1 VW-      CLINICAL INDICATION:     sob; J96.01-Acute respiratory failure with  hypoxia; I95.9-Hypotension, unspecified; A41.9-Sepsis, unspecified  organism; J18.9-Pneumonia, unspecified organism; N30.00-Acute cystitis  without hematuria     TECHNIQUE:  XR CHEST 1 VW-      COMPARISON: 3/1/2019      FINDINGS:   ETT with tip just above clavicles.  Left Port-A-Cath with tip in the SVC  region.     Mild left greater than right basilar airspace disease.  Cardiomegaly.   No pneumothorax.  No acute bony findings.       Impression:       1. ETT with tip above clavicles.  2. Basilar airspace disease.  Cardiomegaly.     This report was finalized on 3/4/2019 10:55 AM by Dr. Matthew Juárez MD.       XR Chest 1 View [[id number]] Collected:  03/01/19 0733     Updated:  03/01/19 0736    Narrative:       XR CHEST 1 VW-     CLINICAL INDICATION: sob; J96.01-Acute respiratory failure with hypoxia;  I95.9-Hypotension, unspecified; A41.9-Sepsis, unspecified organism;  J18.9-Pneumonia, unspecified organism; N30.00-Acute cystitis without  hematuria          COMPARISON: 2/28/2019      TECHNIQUE: Single frontal view of the chest.     FINDINGS:     Endotracheal tube tip is at the thoracic inlet.  Right suprahilar consolidation  There is no evidence of an acute osseous abnormality.   There are no suspicious-appearing parenchymal soft tissue nodules.            Impression:       Right suprahilar consolidation  Endotracheal tube as above         This report was finalized on 3/1/2019 7:33 AM by Dr. Andrea Balbuena MD.       XR Chest 1 View [[id number]] Collected:  02/28/19 0722     Updated:  02/28/19 0725    Narrative:       XR  CHEST 1 VW-     CLINICAL INDICATION: sob; J96.01-Acute respiratory failure with hypoxia;  I95.9-Hypotension, unspecified; A41.9-Sepsis, unspecified organism;  J18.9-Pneumonia, unspecified organism; N30.00-Acute cystitis without  hematuria          COMPARISON: 2/27/2019      TECHNIQUE: Single frontal view of the chest.     FINDINGS:     Endotracheal tube and central line are stable.  Probable mild pulmonary congestion and superimposed right upper lobe  pneumonia  There is no evidence of an acute osseous abnormality.   There are no suspicious-appearing parenchymal soft tissue nodules.            Impression:       Right upper lobe pneumonia and probably mild pulmonary congestion         This report was finalized on 2/28/2019 7:23 AM by Dr. Andrea Balbuena MD.       CT Abdomen Pelvis Without Contrast [[id number]] Collected:  02/27/19 1023     Updated:  02/27/19 1027    Narrative:       CT ABDOMEN PELVIS WO CONTRAST-     CLINICAL INDICATION: abd distension; J96.01-Acute respiratory failure  with hypoxia; I95.9-Hypotension, unspecified; A41.9-Sepsis, unspecified  organism; J18.9-Pneumonia, unspecified organism; N30.00-Acute cystitis  without hematuria          COMPARISON: None available     TECHNIQUE: Axial images were acquired from the lung bases through the  pubic symphysis without any IV or oral contrast.  Reformatted images were created in both the coronal and sagittal planes.     Radiation dose reduction techniques were utilized per ALARA protocol.  Automated exposure control was initiated through either or CareDose or  DoseRigTvinci software packages by  protocol.           FINDINGS:   There is bibasilar consolidation concerning for bibasilar pneumonia     The liver is homogeneous. There is no evidence of focal hepatic mass     The spleen is homogeneous     There is no peripancreatic stranding or pancreatic head mass.     There is no adrenal enlargement.     Nonobstructing stones are present within both  kidneys     There is a retroaortic left renal vein..      Otherwise I do not see any free fluid or walled off fluid collections.     There is thickening of the distal descending and sigmoid colon wall.     There is no evidence of mesenteric or retroperitoneal adenopathy               Impression:       :   1. Thickening of the distal descending and sigmoid colon wall  2. Nonobstructing stones in both kidneys  3. Scoliosis                This report was finalized on 2/27/2019 10:25 AM by Dr. Andrea Balbuena MD.       CT Head Without Contrast [[id number]] Collected:  02/27/19 1012     Updated:  02/27/19 1022    Narrative:       CT HEAD WO CONTRAST-     CLINICAL INDICATION: ams; J96.01-Acute respiratory failure with hypoxia;  I95.9-Hypotension, unspecified; A41.9-Sepsis, unspecified organism;  J18.9-Pneumonia, unspecified organism; N30.00-Acute cystitis without  hematuria          COMPARISON: None available      TECHNIQUE: Axial images of the brain were obtained with out intravenous  contrast.  Reformatted images were created in the sagittal and coronal  planes.     DOSE:         Radiation dose reduction techniques were utilized per ALARA protocol.  Automated exposure control was initiated through either or CareDoIGLOO Software or  DoseRight software packages by  protocol.           FINDINGS:   High density focus in the left thalamus, probably calcification rather  than left-sided thalamic hemorrhage  Ventriculomegaly particularly left-sided out. Also left-sided  encephalomalacia.  There is no evidence of acute ischemia.  I do not see epidural or subdural hematoma.  The gray-white differentiation is appropriate.   The bone window setting images show no destructive calvarial lesion or  acute calvarial fracture.   The posterior fossa is unremarkable.             Impression:          1. High density material in the left thalamus, probably calcification  rather than hemorrhage but recommend clinical correlation,  particularly  for hypertension.     This report was finalized on 2/27/2019 10:13 AM by Dr. Andrea Balbuena MD.       XR Abdomen 2 View With Chest 1 View [[id number]] Collected:  02/27/19 0757     Updated:  02/27/19 0825    Narrative:       XR ABDOMEN 2 VIEW WITH CHEST 1 VIEW-      CLINICAL INDICATION: Abdominal distention; J96.01-Acute respiratory  failure with hypoxia; I95.9-Hypotension, unspecified; A41.9-Sepsis,  unspecified organism; J18.9-Pneumonia, unspecified organism;  N30.00-Acute cystitis without hematuria.          COMPARISON: Chest radiograph 02/26/2019.     FINDINGS: Endotracheal tube overlies the tracheal air column well above  the celeste. A new line has been placed.. The tip is at the GE junction.     There is right upper lobe consolidation. Equivocal left basilar  consolidation.     2 views of the abdomen show no pneumatosis or free air.     No evidence of bowel obstruction.     Extensive scoliosis.     This report was finalized on 2/27/2019 8:23 AM by Dr. Andrea Balbuena MD.       XR Chest 1 View [[id number]] Collected:  02/26/19 1430     Updated:  02/26/19 1440    Narrative:       XR CHEST 1 VW-     CLINICAL INDICATION: ams          COMPARISON: 1/16/2015      TECHNIQUE: Single frontal view of the chest.     FINDINGS:     Endotracheal tube overlies the tracheal air column the superior aspect  of the sternoclavicular junction. It is above the celeste  Increased markings particularly in the right perihilar region concerning  for pneumonia.  There is no evidence of an acute osseous abnormality.   There are no suspicious-appearing parenchymal soft tissue nodules.            Impression:       Appearance concerning for right perihilar pneumonia  Endotracheal tube overlies the tracheal air column as above         This report was finalized on 2/26/2019 2:31 PM by Dr. Andrea Balbuena MD.           I have personally reviewed the above radiology results.    ---------------------------------------------------------------------------------------------------------------------      Assessment & Plan        Assessment/Plan       ASSESSMENT:    1. Septic shock requiring mechanical ventilation and levophed  2. Bilateral pneumonia  3. Bacteremia versus contaminant    PLAN:    Patient presented with unresponsiveness and respiratory failure. WBC worsening at 22.76. CRP 24.9. Urine culture from 2/26/19 finalized as no growth. C Difficile negative. Influenza A positive. Chest x-ray from 3/4/2019 revealed bilateral airspace disease, right greater than left. Blood culture from 2/26/19 1 out of 2 sets positive for Coag negative Staphylococcus. MRSA nasal swab negative.     In the setting of recent Influenza, MRSA pneumonia would be the most common super infection. We recommend escalating Doxycycline to Zyvox 600mg IV Q12H, if okay with primary team, despite negative MRSA nasal screen. Recommend de-escalating Merrem to high-dose Cefepime 2gm IV Q8H and Flagyl 500mg IV Q8H, if okay with primary team.     At this time we feel positive blood culture to be a contaminant. CRP ordered for AM.      Code Status:   Code Status and Medical Interventions:   Ordered at: 02/28/19 1200     Limited Support to NOT Include:    Cardioversion/Defibrillation     Level Of Support Discussed With:    Health Care Surrogate     Code Status:    No CPR     Medical Interventions (Level of Support Prior to Arrest):    Limited           Julia Macias, APRN  03/04/19  12:37 PM

## 2019-03-04 NOTE — PROGRESS NOTES
Discharge Planning Assessment   [facility]     Patient Name: Alyson Mercado  MRN: [id number]  Today's Date: 3/4/2019    Admit Date: 2/26/2019      Discharge Plan     Row Name 03/04/19 1558       Plan    Plan  SS spoke with [facility] on this date per Melanie who states they are unable to accept pt at this time. SS will follow.     Patient/Family in Agreement with Plan  yes        Expected Discharge Date and Time     Expected Discharge Date Expected Discharge Time    Mar 3, 2019             Chata Herman

## 2019-03-04 NOTE — PROGRESS NOTES
[facility]     Patient Identification:  Name:  Alyson Mercado  Age:  19 y.o.  Sex:  female  :  2000  MRN:  [id number]  Visit Number:  [id number]  ROOM: [address]     Primary Care Provider:  William Zaldivar MD    Length of stay:  6   Time:1345- 1420    Subjective        Chief Compliant Follow up for pneumonia, shock, seizure    Patient seen and examined this evening with REFUGIO weeks at the bedside.  No family at the bedside.  Patient awake with L eye open.  Was given albumin bolus this morning and currently off of Levophed. Blood pressure stable.  Did have episode of seizure this morning resolved after giving Ativan 4 mg and had seizure this evening and received Ativan 2 mg and her home regimen of antiepileptic medications.  Noted to have significant amount of oral secretions which improved after Robinul.  Diarrhea improving. Afebrile overnight.  FiO2 stable at 65%.  Tolerating tube feeding.      Objective     Current Hospital Meds:  chlorhexidine 15 mL Mouth/Throat Q12H   clonazePAM 2 mg Per J Tube TID   doxycycline 100 mg Intravenous Q12H   glycopyrrolate 0.5 mg Oral Q8H   heparin (porcine) 5,000 Units Subcutaneous Q8H   insulin aspart 0-9 Units Subcutaneous 4x Daily AC & at Bedtime   ipratropium-albuterol 3 mL Nebulization Q6H - RT   lacosamide 200 mg Per J Tube TID   lactobacillus acidophilus 1 capsule Oral Daily   lansoprazole 30 mg Per G Tube BID   levothyroxine sodium 50 mcg Intravenous Daily   meropenem 1 g Intravenous Q8H   methylPREDNISolone sodium succinate 20 mg Intravenous Q8H   nystatin susp + lidocaine viscous 5 mL Swish & Swallow 4x Daily   sodium chloride 10 mL Intravenous Q12H   sodium chloride 3 mL Intravenous Q12H   Vigabatrin 1,000 mg Per PEG Tube TID     fentaNYL (SUBLIMAZE) PCA 1500 mcg/30 mL syringe     norepinephrine 0.02-0.3 mcg/kg/min Last Rate: Stopped (19 1323)   propofol 5-50 mcg/kg/min Last Rate: 5 mcg/kg/min (19  0214)     ----------------------------------------------------------------------------------------------------------------------  Vital Signs:  Temp:  [97.8 °F (36.6 °C)-99.9 °F (37.7 °C)] 98.4 °F (36.9 °C)  Heart Rate:  [] 99  Resp:  [18-23] 20  BP: ()/() 107/74  FiO2 (%):  [65 %] 65 %  SpO2:  [87 %-100 %] 98 %  on   ;   Device (Oxygen Therapy): ventilator  Body mass index is 30.52 kg/m².    Wt Readings from Last 3 Encounters:   03/04/19 45.4 kg (100 lb)       Intake/Output Summary (Last 24 hours) at 3/4/2019 1705  Last data filed at 3/4/2019 1221  Gross per 24 hour   Intake 1904.41 ml   Output 400 ml   Net 1504.41 ml     NPO Diet  ----------------------------------------------------------------------------------------------------------------------  Physical exam:  General: Comfortable, Not in distress.    HENT:  Head:  atraumatic.  Mouth:  Moist mucous membranes.    Eyes:  Conjunctivae and EOM are normal.  Pupils are equal, round, and reactive to light left eye. right eye closed. No scleral icterus.    Neck:  Neck supple.  No JVD present.  trachea midline.  Cardiovascular: tachycardia, regular rhythm with no murmur.  Pulmonary/Chest:  No respiratory distress, no wheezes, no crackles, with coarse breath sounds and good air movement.  Abdomen:  Soft.  Mild distension. Bowel sounds are normal.  J tube + with minimal erythema.  Musculoskeletal:  Contractures + in all the four extremities.   Neurological:    Skin:  Skin is warm and dry. No rash noted. No pallor.   Peripheral vascular:  pulses in all 4 extremities with no clubbing, no cyanosis, no edema.  Genitourinary: jeffers +  ----------------------------------------------------------------------------------------------------------------------  ----------------------------------------------------------------------------------------------------------------------Results from last 7 days   Lab Units 03/04/19  1124 03/04/19  0251 03/03/19  0126  03/02/19  0256  02/26/19  1408   CRP mg/dL  --   --   --   --   --  24.90*   LACTATE mmol/L 0.7  --   --   --   --  0.6   WBC 10*3/mm3  --  22.76* 12.55* 12.70*   < > 15.65*   HEMOGLOBIN g/dL  --  11.4* 11.3* 12.7   < > 15.4   HEMATOCRIT %  --  35.9* 34.0* 37.2   < > 43.7   MCV fL  --  103.2* 98.3* 95.6*   < > 96.7*   MCHC g/dL  --  31.8* 33.2 34.1   < > 35.2   PLATELETS 10*3/mm3  --  265 170 188   < > 192    < > = values in this interval not displayed.     Results from last 7 days   Lab Units 03/04/19  0251 03/03/19  0126 03/02/19  0256  02/28/19  0738  02/26/19  1408   SODIUM mmol/L 148 150 151   < >  --    < > 129*   POTASSIUM mmol/L 4.6 4.3 2.7*   < > 2.6*   < > 4.5   MAGNESIUM mg/dL  --   --   --   --  1.6  --  2.5*   CHLORIDE mmol/L 111 116* 109   < >  --    < > 92*   CO2 mmol/L 29.1 26.2 25.7   < >  --    < > 22.6*   BUN mg/dL 12 10 10   < >  --    < > 32*   CREATININE mg/dL 0.16* 0.27* 0.31*   < >  --    < > 0.94   EGFR IF NONAFRICN AM mL/min/1.73 >150 >150 >150   < >  --    < > 76   CALCIUM mg/dL 8.2 7.6* 7.7   < >  --    < > 8.3   GLUCOSE mg/dL 145* 187* 243*   < >  --    < > 152*   ALBUMIN g/dL  --   --   --   --   --   --  3.50   BILIRUBIN mg/dL  --   --   --   --   --   --  0.1*   ALK PHOS U/L  --   --   --   --   --   --  196*   AST (SGOT) U/L  --   --   --   --   --   --  88*   ALT (SGPT) U/L  --   --   --   --   --   --  4*    < > = values in this interval not displayed.   Estimated Creatinine Clearance: 332 mL/min (A) (by C-G formula based on SCr of 0.16 mg/dL (L)).  Results from last 7 days   Lab Units 02/26/19  1408   CK TOTAL U/L 37   TROPONIN I ng/mL 0.008     Glucose   Date/Time Value Ref Range Status   03/04/2019 1100 101 70 - 130 mg/dL Final   03/04/2019 0610 147 (H) 70 - 130 mg/dL Final   03/03/2019 2038 131 (H) 70 - 130 mg/dL Final   03/03/2019 1702 161 (H) 70 - 130 mg/dL Final   03/03/2019 1159 124 70 - 130 mg/dL Final   03/03/2019 0609 139 (H) 70 - 130 mg/dL Final   03/02/2019 2042 213  (H) 70 - 130 mg/dL Final   03/02/2019 1705 208 (H) 70 - 130 mg/dL Final     No results found for: AMMONIA    Results from last 7 days   Lab Units 02/26/19  1408   NITRITE UA  Negative   WBC UA /HPF 6-12*   BACTERIA UA /HPF 3+*   SQUAM EPITHEL UA /HPF 21-30*   URINECX  No growth     Blood Culture   Date Value Ref Range Status   02/28/2019 No growth at 3 days  Preliminary   02/26/2019 Staphylococcus, coagulase negative (A)  Final     Comment:     Probable Contaminant.     02/26/2019 No growth at 5 days  Final      Urine Culture   Date Value Ref Range Status   02/26/2019 No growth  Final        I have personally looked at the labs and they are summarized above.  ----------------------------------------------------------------------------------------------------------------------  Imaging Results (last 24 hours)     Procedure Component Value Units Date/Time    XR Chest 1 View [[id number]] Collected:  03/04/19 1053     Updated:  03/04/19 1057    Narrative:       EXAMINATION: XR CHEST 1 VW-      CLINICAL INDICATION:     sob; J96.01-Acute respiratory failure with  hypoxia; I95.9-Hypotension, unspecified; A41.9-Sepsis, unspecified  organism; J18.9-Pneumonia, unspecified organism; N30.00-Acute cystitis  without hematuria     TECHNIQUE:  XR CHEST 1 VW-      COMPARISON: 3/1/2019      FINDINGS:   ETT with tip just above clavicles.  Left Port-A-Cath with tip in the SVC  region.     Mild left greater than right basilar airspace disease.  Cardiomegaly.   No pneumothorax.  No acute bony findings.       Impression:       1. ETT with tip above clavicles.  2. Basilar airspace disease.  Cardiomegaly.     This report was finalized on 3/4/2019 10:55 AM by Dr. Matthew Juárez MD.           I have personally reviewed the radiology images and read the final radiology report.    Assessment & Plan      Assessment:  Septic Shock  B/L Pneumonia  Influenza A  Acute hypoxic respiratory failure  Diarrhea  Dilantin toxicity  Intractable complex  partial seizure  Acute metabolic encephalopathy from sepsis  Cerebral Palsy    Plan:  Septic shock secondary to bilateral pneumonia.  Currently off of Levophed.  Continue to monitor blood pressure closely.  Continue with steroids.  Leukocytosis worsening could be secondary to steroids.  Check CRP in a.m.    For bilateral pneumonia, currently on doxycycline and meropenem.  Get respiratory culture.  ID consultation done.  Advised Zyvox and cefepime.  Patient has history of anaphylactic reaction to Rocephin so cefepime cannot be given.  Zyvox has adverse reaction of seizure.  Have called Dr. Pastor and Julia with ID to discuss further regarding antibiotics.  Awaiting callback.  Meropenem also has adverse reaction of seizures.  MRSA PCR is negative although risk of MRSA pneumonia post influenza is very high.  Other options include telavancin and clindamycin.  Telavancin the same group of vancomycin so risk of allergic reaction is high.  Follow-up respiratory culture and decide on the antibiotics accordingly.    Finished course of Tamiflu for influenza A.  For acute hypoxic respiratory failure, pulmonology critical care on board.  Continue to wean FiO2.  Continue with duo nebs.  Diarrhea improving.  Likely secondary to tube feeding.  C. difficile negative.  Dilantin toxicity, levels elevated at the time of admission.  Will recheck level.  Will resume Dilantin if levels therapy repeat.    For intractable seizure, continue with Vigabatrin, Vimpat and clonazepam.  Will do as needed Ativan.  Consult neurology.  Lansoprazole for GI prophylaxis  Heparin for DVT prophylaxis  Critical care time 35 minutes.      The patient is high risk due to the following diagnoses/reasons:  Septic Shock, B/L Pneumonia  Influenza A, Acute hypoxic respiratory failure, Dilantin toxicity, Intractable complex partial seizure    Mariah Barnhart MD  03/04/19  5:05 PM

## 2019-03-05 ENCOUNTER — APPOINTMENT (OUTPATIENT)
Dept: GENERAL RADIOLOGY | Facility: HOSPITAL | Age: 19
End: 2019-03-05

## 2019-03-05 LAB
A-A DO2: >300 MMHG (ref 0–300)
ACETONE BLD QL: ABNORMAL
ANION GAP SERPL CALCULATED.3IONS-SCNC: 11.7 MMOL/L (ref 3.6–11.2)
ARTERIAL PATENCY WRIST A: POSITIVE
ATMOSPHERIC PRESS: 729 MMHG
BACTERIA SPEC AEROBE CULT: NORMAL
BASE EXCESS BLDA CALC-SCNC: 3 MMOL/L
BDY SITE: ABNORMAL
BODY TEMPERATURE: 98.6 C
BUN BLD-MCNC: 13 MG/DL (ref 7–21)
BUN/CREAT SERPL: 61.9 (ref 7–25)
CALCIUM SPEC-SCNC: 8.6 MG/DL (ref 7.7–10)
CHLORIDE SERPL-SCNC: 109 MMOL/L (ref 99–112)
CO2 SERPL-SCNC: 27.3 MMOL/L (ref 24.3–31.9)
COHGB MFR BLD: 1 % (ref 0–5)
CREAT BLD-MCNC: 0.21 MG/DL (ref 0.43–1.29)
CRP SERPL-MCNC: 2.35 MG/DL (ref 0–0.99)
CYTOLOGIST CVX/VAG CYTO: NORMAL
D-LACTATE SERPL-SCNC: 1.2 MMOL/L (ref 0.5–2)
DEPRECATED RDW RBC AUTO: 49.5 FL (ref 37–54)
ERYTHROCYTE [DISTWIDTH] IN BLOOD BY AUTOMATED COUNT: 13.2 % (ref 11.5–14.5)
GFR SERPL CREATININE-BSD FRML MDRD: >150 ML/MIN/1.73
GLUCOSE BLD-MCNC: 133 MG/DL (ref 70–110)
GLUCOSE BLDC GLUCOMTR-MCNC: 117 MG/DL (ref 70–130)
GLUCOSE BLDC GLUCOMTR-MCNC: 129 MG/DL (ref 70–130)
GLUCOSE BLDC GLUCOMTR-MCNC: 140 MG/DL (ref 70–130)
GLUCOSE BLDC GLUCOMTR-MCNC: 147 MG/DL (ref 70–130)
HCO3 BLDA-SCNC: 27.8 MMOL/L (ref 22–26)
HCT VFR BLD AUTO: 34.8 % (ref 37–47)
HCT VFR BLD CALC: 34 % (ref 37–47)
HGB BLD-MCNC: 11.1 G/DL (ref 12–16)
HGB BLDA-MCNC: 11.5 G/DL (ref 12–16)
HOROWITZ INDEX BLD+IHG-RTO: 65 %
LYMPHOCYTES # BLD MANUAL: 1.89 10*3/MM3 (ref 1–3)
LYMPHOCYTES NFR BLD MANUAL: 11 % (ref 21–51)
LYMPHOCYTES NFR BLD MANUAL: 8 % (ref 0–10)
MCH RBC QN AUTO: 33.5 PG (ref 27–33)
MCHC RBC AUTO-ENTMCNC: 31.9 G/DL (ref 33–37)
MCV RBC AUTO: 105.1 FL (ref 80–94)
METAMYELOCYTES NFR BLD MANUAL: 9 % (ref 0–0)
METHGB BLD QL: 0.2 % (ref 0–3)
MODALITY: ABNORMAL
MONOCYTES # BLD AUTO: 1.37 10*3/MM3 (ref 0.1–0.9)
MYELOCYTES NFR BLD MANUAL: 26 % (ref 0–0)
NEUTROPHILS # BLD AUTO: 7.73 10*3/MM3 (ref 1.4–6.5)
NEUTROPHILS NFR BLD MANUAL: 38 % (ref 30–70)
NEUTS BAND NFR BLD MANUAL: 7 % (ref 4–12)
NRBC SPEC MANUAL: 1 /100 WBC (ref 0–0)
OSMOLALITY SERPL CALC.SUM OF ELEC: 296.3 MOSM/KG (ref 273–305)
OXYHGB MFR BLDV: 93.6 % (ref 85–100)
PATH INTERP BLD-IMP: NORMAL
PCO2 BLDA: 43.6 MM HG (ref 35–45)
PEEP RESPIRATORY: 8 CM[H2O]
PH BLDA: 7.42 PH UNITS (ref 7.35–7.45)
PHENYTOIN FREE SERPL-MCNC: 2.1 UG/ML (ref 1–2)
PLAT MORPH BLD: NORMAL
PLATELET # BLD AUTO: 329 10*3/MM3 (ref 130–400)
PMV BLD AUTO: 12.2 FL (ref 6–10)
PO2 BLDA: 79.3 MM HG (ref 80–100)
POLYCHROMASIA BLD QL SMEAR: ABNORMAL
POTASSIUM BLD-SCNC: 4.6 MMOL/L (ref 3.5–5.3)
PROMYELOCYTES NFR BLD MANUAL: 1 % (ref 0–0)
RBC # BLD AUTO: 3.31 10*6/MM3 (ref 4.2–5.4)
SAO2 % BLDCOA: 94.7 % (ref 90–100)
SCAN SLIDE: NORMAL
SET MECH RESP RATE: 18
SODIUM BLD-SCNC: 148 MMOL/L (ref 135–153)
VENTILATOR MODE: ABNORMAL
VT ON VENT VENT: 330 ML
WBC NRBC COR # BLD: 17.18 10*3/MM3 (ref 4.5–12.5)

## 2019-03-05 PROCEDURE — 94799 UNLISTED PULMONARY SVC/PX: CPT

## 2019-03-05 PROCEDURE — 82375 ASSAY CARBOXYHB QUANT: CPT | Performed by: NURSE PRACTITIONER

## 2019-03-05 PROCEDURE — 25010000002 METHYLPREDNISOLONE PER 40 MG: Performed by: HOSPITALIST

## 2019-03-05 PROCEDURE — 71045 X-RAY EXAM CHEST 1 VIEW: CPT

## 2019-03-05 PROCEDURE — 83605 ASSAY OF LACTIC ACID: CPT | Performed by: INTERNAL MEDICINE

## 2019-03-05 PROCEDURE — 85025 COMPLETE CBC W/AUTO DIFF WBC: CPT | Performed by: HOSPITALIST

## 2019-03-05 PROCEDURE — 82805 BLOOD GASES W/O2 SATURATION: CPT | Performed by: NURSE PRACTITIONER

## 2019-03-05 PROCEDURE — 82009 KETONE BODYS QUAL: CPT | Performed by: INTERNAL MEDICINE

## 2019-03-05 PROCEDURE — 25010000002 PROPOFOL 1000 MG/ML EMULSION: Performed by: PHYSICIAN ASSISTANT

## 2019-03-05 PROCEDURE — 85060 BLOOD SMEAR INTERPRETATION: CPT | Performed by: HOSPITALIST

## 2019-03-05 PROCEDURE — 99291 CRITICAL CARE FIRST HOUR: CPT | Performed by: INTERNAL MEDICINE

## 2019-03-05 PROCEDURE — 82962 GLUCOSE BLOOD TEST: CPT

## 2019-03-05 PROCEDURE — 36600 WITHDRAWAL OF ARTERIAL BLOOD: CPT | Performed by: NURSE PRACTITIONER

## 2019-03-05 PROCEDURE — 25010000002 MEROPENEM

## 2019-03-05 PROCEDURE — 83050 HGB METHEMOGLOBIN QUAN: CPT | Performed by: NURSE PRACTITIONER

## 2019-03-05 PROCEDURE — 80048 BASIC METABOLIC PNL TOTAL CA: CPT | Performed by: HOSPITALIST

## 2019-03-05 PROCEDURE — 85007 BL SMEAR W/DIFF WBC COUNT: CPT | Performed by: HOSPITALIST

## 2019-03-05 PROCEDURE — 25010000002 HYDROCORTISONE SODIUM SUCCINATE 100 MG RECONSTITUTED SOLUTION: Performed by: HOSPITALIST

## 2019-03-05 PROCEDURE — 86140 C-REACTIVE PROTEIN: CPT | Performed by: HOSPITALIST

## 2019-03-05 PROCEDURE — 71045 X-RAY EXAM CHEST 1 VIEW: CPT | Performed by: RADIOLOGY

## 2019-03-05 PROCEDURE — 25010000002 FUROSEMIDE PER 20 MG: Performed by: INTERNAL MEDICINE

## 2019-03-05 PROCEDURE — 99233 SBSQ HOSP IP/OBS HIGH 50: CPT | Performed by: HOSPITALIST

## 2019-03-05 PROCEDURE — 94003 VENT MGMT INPAT SUBQ DAY: CPT

## 2019-03-05 PROCEDURE — 25010000002 HEPARIN (PORCINE) PER 1000 UNITS: Performed by: INTERNAL MEDICINE

## 2019-03-05 RX ORDER — FUROSEMIDE 10 MG/ML
20 INJECTION INTRAMUSCULAR; INTRAVENOUS ONCE
Status: COMPLETED | OUTPATIENT
Start: 2019-03-05 | End: 2019-03-05

## 2019-03-05 RX ADMIN — IPRATROPIUM BROMIDE AND ALBUTEROL SULFATE 3 ML: .5; 3 SOLUTION RESPIRATORY (INHALATION) at 07:18

## 2019-03-05 RX ADMIN — DOXYCYCLINE 100 MG: 100 INJECTION, POWDER, LYOPHILIZED, FOR SOLUTION INTRAVENOUS at 23:21

## 2019-03-05 RX ADMIN — Medication 1 CAPSULE: at 09:50

## 2019-03-05 RX ADMIN — IPRATROPIUM BROMIDE AND ALBUTEROL SULFATE 3 ML: .5; 3 SOLUTION RESPIRATORY (INHALATION) at 18:45

## 2019-03-05 RX ADMIN — GLYCOPYRROLATE 0.5 MG: 1 TABLET ORAL at 21:03

## 2019-03-05 RX ADMIN — Medication 5 ML: at 09:55

## 2019-03-05 RX ADMIN — Medication 5 ML: at 17:15

## 2019-03-05 RX ADMIN — MEROPENEM 1 G: 1 INJECTION, POWDER, FOR SOLUTION INTRAVENOUS at 09:50

## 2019-03-05 RX ADMIN — LACOSAMIDE 200 MG: 50 TABLET, FILM COATED ORAL at 16:00

## 2019-03-05 RX ADMIN — SODIUM CHLORIDE, PRESERVATIVE FREE 10 ML: 5 INJECTION INTRAVENOUS at 20:47

## 2019-03-05 RX ADMIN — CHLORHEXIDINE GLUCONATE 15 ML: 1.2 RINSE ORAL at 09:49

## 2019-03-05 RX ADMIN — PROPOFOL 25 MCG/KG/MIN: 10 INJECTION, EMULSION INTRAVENOUS at 14:03

## 2019-03-05 RX ADMIN — DOXYCYCLINE 100 MG: 100 INJECTION, POWDER, LYOPHILIZED, FOR SOLUTION INTRAVENOUS at 10:06

## 2019-03-05 RX ADMIN — CLONAZEPAM 2 MG: 1 TABLET ORAL at 09:50

## 2019-03-05 RX ADMIN — MEROPENEM 1 G: 1 INJECTION, POWDER, FOR SOLUTION INTRAVENOUS at 17:12

## 2019-03-05 RX ADMIN — LANSOPRAZOLE 30 MG: KIT at 09:50

## 2019-03-05 RX ADMIN — CLONAZEPAM 2 MG: 1 TABLET ORAL at 16:00

## 2019-03-05 RX ADMIN — MEROPENEM 1 G: 1 INJECTION, POWDER, FOR SOLUTION INTRAVENOUS at 00:57

## 2019-03-05 RX ADMIN — SODIUM CHLORIDE, PRESERVATIVE FREE 3 ML: 5 INJECTION INTRAVENOUS at 20:46

## 2019-03-05 RX ADMIN — Medication: at 14:03

## 2019-03-05 RX ADMIN — IPRATROPIUM BROMIDE AND ALBUTEROL SULFATE 3 ML: .5; 3 SOLUTION RESPIRATORY (INHALATION) at 13:20

## 2019-03-05 RX ADMIN — VIGABATRIN 1000 MG: 500 POWDER, FOR SOLUTION ORAL at 17:15

## 2019-03-05 RX ADMIN — SODIUM CHLORIDE, PRESERVATIVE FREE 3 ML: 5 INJECTION INTRAVENOUS at 09:54

## 2019-03-05 RX ADMIN — LACOSAMIDE 200 MG: 50 TABLET, FILM COATED ORAL at 11:50

## 2019-03-05 RX ADMIN — VIGABATRIN 1000 MG: 500 POWDER, FOR SOLUTION ORAL at 20:48

## 2019-03-05 RX ADMIN — METHYLPREDNISOLONE SODIUM SUCCINATE 20 MG: 40 INJECTION, POWDER, FOR SOLUTION INTRAMUSCULAR; INTRAVENOUS at 01:02

## 2019-03-05 RX ADMIN — CHLORHEXIDINE GLUCONATE 15 ML: 1.2 RINSE ORAL at 20:48

## 2019-03-05 RX ADMIN — HEPARIN SODIUM 5000 UNITS: 5000 INJECTION INTRAVENOUS; SUBCUTANEOUS at 14:06

## 2019-03-05 RX ADMIN — CLONAZEPAM 2 MG: 1 TABLET ORAL at 20:46

## 2019-03-05 RX ADMIN — FUROSEMIDE 20 MG: 10 INJECTION, SOLUTION INTRAMUSCULAR; INTRAVENOUS at 10:05

## 2019-03-05 RX ADMIN — HYDROCORTISONE SODIUM SUCCINATE 100 MG: 100 INJECTION, POWDER, FOR SOLUTION INTRAMUSCULAR; INTRAVENOUS at 11:51

## 2019-03-05 RX ADMIN — GLYCOPYRROLATE 0.5 MG: 1 TABLET ORAL at 17:13

## 2019-03-05 RX ADMIN — LACOSAMIDE 200 MG: 50 TABLET, FILM COATED ORAL at 20:45

## 2019-03-05 RX ADMIN — VIGABATRIN 1000 MG: 500 POWDER, FOR SOLUTION ORAL at 09:53

## 2019-03-05 RX ADMIN — Medication 5 ML: at 11:58

## 2019-03-05 RX ADMIN — LEVOTHYROXINE SODIUM ANHYDROUS 50 MCG: 100 INJECTION, POWDER, LYOPHILIZED, FOR SOLUTION INTRAVENOUS at 10:08

## 2019-03-05 RX ADMIN — HEPARIN SODIUM 5000 UNITS: 5000 INJECTION INTRAVENOUS; SUBCUTANEOUS at 21:02

## 2019-03-05 RX ADMIN — HEPARIN SODIUM 5000 UNITS: 5000 INJECTION INTRAVENOUS; SUBCUTANEOUS at 05:33

## 2019-03-05 RX ADMIN — NOREPINEPHRINE BITARTRATE 0.2 MCG/KG/MIN: 1 INJECTION INTRAVENOUS at 00:56

## 2019-03-05 RX ADMIN — Medication 5 ML: at 20:44

## 2019-03-05 RX ADMIN — LANSOPRAZOLE 30 MG: KIT at 20:49

## 2019-03-05 RX ADMIN — IPRATROPIUM BROMIDE AND ALBUTEROL SULFATE 3 ML: .5; 3 SOLUTION RESPIRATORY (INHALATION) at 00:43

## 2019-03-05 RX ADMIN — HYDROCORTISONE SODIUM SUCCINATE 100 MG: 100 INJECTION, POWDER, FOR SOLUTION INTRAMUSCULAR; INTRAVENOUS at 20:45

## 2019-03-05 RX ADMIN — SODIUM CHLORIDE, PRESERVATIVE FREE 10 ML: 5 INJECTION INTRAVENOUS at 09:55

## 2019-03-05 RX ADMIN — METHYLPREDNISOLONE SODIUM SUCCINATE 20 MG: 40 INJECTION, POWDER, FOR SOLUTION INTRAMUSCULAR; INTRAVENOUS at 10:05

## 2019-03-05 RX ADMIN — SODIUM CHLORIDE 500 ML: 9 INJECTION, SOLUTION INTRAVENOUS at 11:51

## 2019-03-05 RX ADMIN — GLYCOPYRROLATE 0.5 MG: 1 TABLET ORAL at 05:33

## 2019-03-05 NOTE — PROGRESS NOTES
PROGRESS NOTE         Patient Identification:  Name:  Alyson Mercado  Age:  19 y.o.  Sex:  female  :  2000  MRN:  [id number]  Visit Number:  [id number]  Primary Care Provider:  William Zaldivar MD      ----------------------------------------------------------------------------------------------------------------------  Subjective       Chief Complaints:    Loss of Consciousness        Interval History:      Patient remains sedated and ventilated at 65% FiO2. Continues to require Levophed for blood pressure support. CRP improving at 2.35.     Review of Systems:       Unable to obtain. Intubated and sedated.     ----------------------------------------------------------------------------------------------------------------------      Objective       Current Hospital Meds:    chlorhexidine 15 mL Mouth/Throat Q12H   clonazePAM 2 mg Per J Tube TID   doxycycline 100 mg Intravenous Q12H   glycopyrrolate 0.5 mg Oral Q8H   heparin (porcine) 5,000 Units Subcutaneous Q8H   hydrocortisone sodium succinate 100 mg Intravenous Q8H   insulin aspart 0-9 Units Subcutaneous 4x Daily AC & at Bedtime   ipratropium-albuterol 3 mL Nebulization Q6H - RT   lacosamide 200 mg Per J Tube TID   lactobacillus acidophilus 1 capsule Oral Daily   lansoprazole 30 mg Per G Tube BID   levothyroxine sodium 50 mcg Intravenous Daily   meropenem 1 g Intravenous Q8H   nystatin susp + lidocaine viscous 5 mL Swish & Swallow 4x Daily   sodium chloride 10 mL Intravenous Q12H   sodium chloride 3 mL Intravenous Q12H   Vigabatrin 1,000 mg Per PEG Tube TID       fentaNYL (SUBLIMAZE) PCA 1500 mcg/30 mL syringe     norepinephrine 0.02-0.3 mcg/kg/min Last Rate: 0.1 mcg/kg/min (19 1009)   propofol 5-50 mcg/kg/min Last Rate: 35 mcg/kg/min (19 0534)     ----------------------------------------------------------------------------------------------------------------------    Vital Signs:  Temp:  [98.3 °F (36.8 °C)-98.7 °F (37.1 °C)]  98.4 °F (36.9 °C)  Heart Rate:  [] 112  Resp:  [17-22] 20  BP: ()/() 108/68  FiO2 (%):  [65 %] 65 %  Mean Arterial Pressure (Non-Invasive) for the past 24 hrs (Last 3 readings):   Noninvasive MAP (mmHg)   03/05/19 0400 121   03/05/19 0200 104   03/05/19 0000 81     SpO2 Percentage    03/05/19 0726 03/05/19 0910 03/05/19 1105   SpO2: 94% 97% 90%     SpO2:  [87 %-100 %] 90 %  on   ;   Device (Oxygen Therapy): ventilator    Body mass index is 30.52 kg/m².  Wt Readings from Last 3 Encounters:   03/05/19 45.4 kg (100 lb)        Intake/Output Summary (Last 24 hours) at 3/5/2019 1333  Last data filed at 3/5/2019 1151  Gross per 24 hour   Intake 2041.95 ml   Output 930 ml   Net 1111.95 ml     NPO Diet  ----------------------------------------------------------------------------------------------------------------------    Physical exam:    Constitutional:  Intubated and sedated.   HENT:  Head: Normocephalic and atraumatic.  Mouth:  Moist mucous membranes.    Eyes:  Conjunctivae and EOM are normal.  No scleral icterus.  Neck:  Neck supple.  No JVD present.    Cardiovascular:  Normal rate, regular rhythm and normal heart sounds with no murmur. No edema.  Pulmonary/Chest: Bilateral scattered rhonchi.   Abdominal:  Soft.  Bowel sounds are normal.  No distension and no tenderness.   Musculoskeletal:  No edema, no tenderness, and no deformity.  No swelling or redness of joints.  Neurological:  Intubated and sedated.   Skin:  Skin is warm and dry.  No rash noted.  No pallor.   Psychiatric:   Intubated and sedated.     ----------------------------------------------------------------------------------------------------------------------    ----------------------------------------------------------------------------------------------------------------------  Results from last 7 days   Lab Units 02/26/19  1408   CK TOTAL U/L 37   TROPONIN I ng/mL 0.008         Results from last 7 days   Lab Units 03/05/19  0503   PH,  ARTERIAL pH units 7.423   PO2 ART mm Hg 79.3*   PCO2, ARTERIAL mm Hg 43.6   HCO3 ART mmol/L 27.8*     Results from last 7 days   Lab Units 03/05/19  1044 03/05/19  0214 03/04/19  1124 03/04/19  0251 03/03/19  0126  02/26/19  1408   CRP mg/dL  --  2.35*  --   --   --   --  24.90*   LACTATE mmol/L 1.2  --  0.7  --   --   --  0.6   WBC 10*3/mm3  --  17.18*  --  22.76* 12.55*   < > 15.65*   HEMOGLOBIN g/dL  --  11.1*  --  11.4* 11.3*   < > 15.4   HEMATOCRIT %  --  34.8*  --  35.9* 34.0*   < > 43.7   MCV fL  --  105.1*  --  103.2* 98.3*   < > 96.7*   MCHC g/dL  --  31.9*  --  31.8* 33.2   < > 35.2   PLATELETS 10*3/mm3  --  329  --  265 170   < > 192    < > = values in this interval not displayed.     Results from last 7 days   Lab Units 03/05/19  0214 03/04/19  0251 03/03/19  0126 02/28/19  0738  02/26/19  1408   SODIUM mmol/L 148 148 150   < >  --    < > 129*   POTASSIUM mmol/L 4.6 4.6 4.3   < > 2.6*   < > 4.5   MAGNESIUM mg/dL  --   --   --   --  1.6  --  2.5*   CHLORIDE mmol/L 109 111 116*   < >  --    < > 92*   CO2 mmol/L 27.3 29.1 26.2   < >  --    < > 22.6*   BUN mg/dL 13 12 10   < >  --    < > 32*   CREATININE mg/dL 0.21* 0.16* 0.27*   < >  --    < > 0.94   EGFR IF NONAFRICN AM mL/min/1.73 >150 >150 >150   < >  --    < > 76   CALCIUM mg/dL 8.6 8.2 7.6*   < >  --    < > 8.3   GLUCOSE mg/dL 133* 145* 187*   < >  --    < > 152*   ALBUMIN g/dL  --   --   --   --   --   --  3.50   BILIRUBIN mg/dL  --   --   --   --   --   --  0.1*   ALK PHOS U/L  --   --   --   --   --   --  196*   AST (SGOT) U/L  --   --   --   --   --   --  88*   ALT (SGPT) U/L  --   --   --   --   --   --  4*    < > = values in this interval not displayed.   Estimated Creatinine Clearance: 253 mL/min (A) (by C-G formula based on SCr of 0.21 mg/dL (L)).  No results found for: AMMONIA    Glucose   Date/Time Value Ref Range Status   03/05/2019 0535 140 (H) 70 - 130 mg/dL Final   03/04/2019 1952 169 (H) 70 - 130 mg/dL Final   03/04/2019 1804 105 70 -  130 mg/dL Final   03/04/2019 1100 101 70 - 130 mg/dL Final   03/04/2019 0610 147 (H) 70 - 130 mg/dL Final   03/03/2019 2038 131 (H) 70 - 130 mg/dL Final   03/03/2019 1702 161 (H) 70 - 130 mg/dL Final   03/03/2019 1159 124 70 - 130 mg/dL Final     No results found for: HGBA1C  Lab Results   Component Value Date    TSH 0.428 (L) 02/26/2019    FREET4 0.66 (L) 02/26/2019       Blood Culture   Date Value Ref Range Status   02/28/2019 No growth at 4 days  Preliminary   02/26/2019 Staphylococcus, coagulase negative (A)  Final     Comment:     Probable Contaminant.     02/26/2019 No growth at 5 days  Final     Urine Culture   Date Value Ref Range Status   02/26/2019 No growth  Final              Pain Management Panel     Pain Management Panel Latest Ref Rng & Units 2/26/2019 6/3/2015    CREATININE UR mg/dL 83.6 22.1          I have personally reviewed the above laboratory results.   ----------------------------------------------------------------------------------------------------------------------  Imaging Results (last 24 hours)     Procedure Component Value Units Date/Time    XR Chest 1 View [[id number]] Collected:  03/05/19 0907     Updated:  03/05/19 0910    Narrative:       EXAMINATION: XR CHEST 1 VW-      CLINICAL INDICATION:     Shortness of breath; J96.01-Acute respiratory  failure with hypoxia; I95.9-Hypotension, unspecified; A41.9-Sepsis,  unspecified organism; J18.9-Pneumonia, unspecified organism;  N30.00-Acute cystitis without hematuria     TECHNIQUE:  XR CHEST 1 VW-      COMPARISON: 3/4/2019      FINDINGS:   ET tube with tip above the clavicles. Left-sided Port-A-Cath stable.     Stable opacities both lungs. Low volumes again noted. Heart size stable.  No effusion or pneumothorax.       Impression:       1. Support devices as above.  2. Stable bilateral airspace disease.     This report was finalized on 3/5/2019 9:08 AM by Dr. Matthew Juárez MD.           I have personally reviewed the above radiology  results.   ----------------------------------------------------------------------------------------------------------------------    Assessment/Plan       Assessment/Plan     ASSESSMENT:    1. Septic shock requiring mechanical ventilation and levophed  2. Bilateral pneumonia  3. Bacteremia versus contaminant    PLAN:    Patient remains sedated and ventilated at 65% FiO2. Continues to require Levophed for blood pressure support. CRP improving at 2.35.      Urine culture from 2/26/19 finalized as no growth. C Difficile negative. Influenza A positive. Chest x-ray from 3/4/2019 revealed bilateral airspace disease, right greater than left. Blood culture from 2/26/19 1 out of 2 sets positive for Coag negative Staphylococcus. MRSA nasal swab negative.     At this time we feel positive blood culture to be a contaminant. CRP ordered for AM.    We recommend to continue to current antibiotic regimen as patient has multiple medication allergies, and is currently improving on this regimen.     Current Antimicrobials:  Merrem 1gm IV Q8H  Doxycyline 100mg IV Q12H         Code Status:   Code Status and Medical Interventions:   Ordered at: 02/28/19 1200     Limited Support to NOT Include:    Cardioversion/Defibrillation     Level Of Support Discussed With:    Health Care Surrogate     Code Status:    No CPR     Medical Interventions (Level of Support Prior to Arrest):    Limited       ALISON Cardozo  03/05/19  1:33 PM     Physician Attestation:    I have personally seen and examined the patient. I reviewed the patient's data including history of present illness, review of systems, physical examination, assessment and treatment plan and agree with findings above. The assessment and plan are my own.  I have reviewed and edited the note above after discussing the findings with the midlevel.    Vinicio Pastor MD  Infectious Diseases  03/05/19  4:48 PM

## 2019-03-05 NOTE — CONSULTS
Duplicate consult request, as palliative has been following. Please see APRN consult note from 2/28.     We will continue to follow along. Please do not hesitate to contact us regarding further sx mgmt or GOC needs, including after hours or on weekends via our on call provider at [phone].        Paulette Mclean MD    3/5/2019

## 2019-03-05 NOTE — PROGRESS NOTES
LOS: 7 days   Patient Care Team:  William Zaldivar MD as PCP - General (Family Medicine)    Chief Complaint:  Pulmonology is following for septic shock related to community acquire pneumonia, influenza A positive, acute hypoxic respiratory failure requiring mechanical ventilation.           Subjective     Interval History: Ms. Mercado is intubated and sedated.  She is on assist control mode with a tidal volume of 330, rate of 18, FiO2 of 65% and a PEEP of 8.  She is currently requiring Levophed for hypotension.  Current blood pressure is 110/73 with a map of 83.  Her heart rate is in normal sinus rhythm with a rate of 78 and she is saturating 96%.  It was reported by her nurse that she was off of Levophed yesterday afternoon but then had a seizure which caused her to be hypotensive.  She had to be started back on Levophed at this time.  Leukocytosis is noted to be improving.  Chest x-ray was reviewed.  No acute events reported overnight.      History taken from: chart RN Patient unable to give history due to patient intubated.    Review of Systems:   Review of Systems - History obtained from chart review and unobtainable from patient due to mental status and Intubated      Objective     Vital Signs  Temp:  [98.3 °F (36.8 °C)-98.7 °F (37.1 °C)] 98.4 °F (36.9 °C)  Heart Rate:  [] 102  Resp:  [17-22] 20  BP: ()/() 108/68  FiO2 (%):  [65 %] 65 %  Body mass index is 30.52 kg/m².    Intake/Output Summary (Last 24 hours) at 3/5/2019 1408  Last data filed at 3/5/2019 1151  Gross per 24 hour   Intake 2041.95 ml   Output 930 ml   Net 1111.95 ml     I/O this shift:  In: 700 [IV Piggyback:700]  Out: -     Physical Exam:  GENERAL APPEARANCE: Intubated and sedated.  Appears to be resting comfortably in bed.     HEAD: normocephalic. Atruamatic    EYES: PERRLA. EOMI    THROAT: ET tube in place. Oral cavity and pharynx normal. No inflammation, swelling, exudate, or lesions.     NECK: Neck supple. No  thryomegaly    CARDIAC: Normal S1 and S2. No S3, S4 or murmurs. Rhythm is regular. There is no peripheral edema, cyanosis or pallor. Extremities are warm and well perfused. Capillary refill is less than 2 seconds. No carotid bruits.    RESPIRATORY: Bilateral air entry positive. Bilaterally diminished breath sounds.  No wheezing, crackles or rhonchi noted.    GI: Positive bowel sounds. Soft, nondistended, nontender.     MUSCULOSKELETAL: No significant deformity or joint abnormality. No edema. Peripheral pulses intact.    NEUROLOGICAL: Unable to assess due to sedation status.     PSYCHIATRIC: Unable to assess due to sedation status.     Results Review:     I reviewed the patient's new clinical results.  I reviewed the patient's new imaging results and agree with the interpretation.  Results from last 7 days   Lab Units 03/05/19  0214 03/04/19 0251 03/03/19 0126   WBC 10*3/mm3 17.18* 22.76* 12.55*   HEMOGLOBIN g/dL 11.1* 11.4* 11.3*   PLATELETS 10*3/mm3 329 265 170     Results from last 7 days   Lab Units 03/05/19  0214 03/04/19  0251 03/03/19  0126  02/28/19  0738   SODIUM mmol/L 148 148 150   < >  --    POTASSIUM mmol/L 4.6 4.6 4.3   < > 2.6*   CHLORIDE mmol/L 109 111 116*   < >  --    CO2 mmol/L 27.3 29.1 26.2   < >  --    BUN mg/dL 13 12 10   < >  --    CREATININE mg/dL 0.21* 0.16* 0.27*   < >  --    CALCIUM mg/dL 8.6 8.2 7.6*   < >  --    GLUCOSE mg/dL 133* 145* 187*   < >  --    MAGNESIUM mg/dL  --   --   --   --  1.6    < > = values in this interval not displayed.     No results found for: INR, PROTIME        Invalid input(s): PROT, LABALBU  Results from last 7 days   Lab Units 03/05/19  0503   PH, ARTERIAL pH units 7.423   PO2 ART mm Hg 79.3*   PCO2, ARTERIAL mm Hg 43.6   HCO3 ART mmol/L 27.8*     Imaging Results (last 24 hours)     Procedure Component Value Units Date/Time    XR Chest 1 View [[id number]] Collected:  03/05/19 0907     Updated:  03/05/19 0910    Narrative:       EXAMINATION: XR CHEST 1 VW-       CLINICAL INDICATION:     Shortness of breath; J96.01-Acute respiratory  failure with hypoxia; I95.9-Hypotension, unspecified; A41.9-Sepsis,  unspecified organism; J18.9-Pneumonia, unspecified organism;  N30.00-Acute cystitis without hematuria     TECHNIQUE:  XR CHEST 1 VW-      COMPARISON: 3/4/2019      FINDINGS:   ET tube with tip above the clavicles. Left-sided Port-A-Cath stable.     Stable opacities both lungs. Low volumes again noted. Heart size stable.  No effusion or pneumothorax.       Impression:       1. Support devices as above.  2. Stable bilateral airspace disease.     This report was finalized on 3/5/2019 9:08 AM by Dr. Matthew Juárez MD.                Medication Review:   Scheduled Medications:    chlorhexidine 15 mL Mouth/Throat Q12H   clonazePAM 2 mg Per J Tube TID   doxycycline 100 mg Intravenous Q12H   glycopyrrolate 0.5 mg Oral Q8H   heparin (porcine) 5,000 Units Subcutaneous Q8H   hydrocortisone sodium succinate 100 mg Intravenous Q8H   insulin aspart 0-9 Units Subcutaneous 4x Daily AC & at Bedtime   ipratropium-albuterol 3 mL Nebulization Q6H - RT   lacosamide 200 mg Per J Tube TID   lactobacillus acidophilus 1 capsule Oral Daily   lansoprazole 30 mg Per G Tube BID   levothyroxine sodium 50 mcg Intravenous Daily   meropenem 1 g Intravenous Q8H   nystatin susp + lidocaine viscous 5 mL Swish & Swallow 4x Daily   sodium chloride 10 mL Intravenous Q12H   sodium chloride 3 mL Intravenous Q12H   Vigabatrin 1,000 mg Per PEG Tube TID     Continuous infusions:    fentaNYL (SUBLIMAZE) PCA 1500 mcg/30 mL syringe     norepinephrine 0.02-0.3 mcg/kg/min Last Rate: 0.1 mcg/kg/min (03/05/19 1009)   propofol 5-50 mcg/kg/min Last Rate: 25 mcg/kg/min (03/05/19 1403)       Assessment/Plan       Assessment:  - Acute hypoxic and hypercarbic respiratory failure, requiring mechanical ventilation  - Mechanical ventilator dependence  - Septic shock  - Anoxic/hypoxic, metabolic encephalopathy versus status  epilepticus  - Right upper lobe pneumonia  - Influenza type A positive        Central nervous system:  Intubated and sedated.   EE2019. abnormal study.  Supportive diffuse cortical dysfunction, as seen in toxic metabolic encephalopathic states and evidence of partial onset seizure.   Plan   - Pain management: fentanyl drip.   - Sedation: Propofol , RASS goal: 0 to -1  - Patient does not have biot's type of breathing pattern while on opioids.   - I highly recommend avoiding nighttime disturbances and light exposure during night to maintain normal circadian rhythms to avoid hypoactive or hyperactive delirium  - Continue with clonazepam        Cardiac/aorta  Vasopressor: norepinephrine.   Plan:  - Goal mean arterial pressure is greater than 65 mmHg  -Solu-Cortef was changed to Solu-Medrol IV twice daily.  -Ordered lactate level  -Read acetone level        Respiratory:  ACVC mode of 18, 330, 65%, 8.   Chest x-ray: reviewed.    ET tube location: Within 5 cm from the celeste.  Plateau pressure: 29  Peak pressure: 30  Compliance: 17  No auto PEEP or leak noted  Plan   - I have reviewed the ventilator graphics.  Patient is synchronous with mechanical ventilator.  There is no auto PEEP or leak appreciated.  - I'm keeping tidal volume equal to 6 ML per KG ideal body weight.  - Mechanical ventilators were changed to keep Plateau pressure less than 30 cm H20  - Goal SPO2 greater than 90%.  - I have reviewed the ABG.    No vent settings were changed.  - Continue azithromycin, doxycyline, DuoNeb, Tamiflu.   -Ordered Lasix 20 mg IV     Gastrointestinal/Liver  CT abdomen and pelvis: 19. Thickening of the distal descending and sigmoid colon wall. Nonobstructing stones in both kidneys. Scoliosis.   Route of feeding: PEG tube.   Plan:  - Continue with PPI for ulcer prophylaxis.  - Continue with lactobacillus  - Nutrition team on board.  Appreciate nutrition team recommendations        Genitorenal:  Input/Output: 1.2 L in  24 hours  Plan   - I will avoid nephrotoxic agents  - Pharmacy is on board for dosages of medication based on creatinine clearance.  Appreciate pharmacy recommendations.  - I will replete serum electrolytes as per ICU electrolyte replacement protocol.        Endocrine   Glucose trending upwards.   Plan   - Goal serum glucose level is 180 mg/dL while in ICU.  I strongly recommend starting insulin drip if 2 consecutive serum glucose levels are greater than 200 mg/dL.   - Continue insulin for glycemic control         Infectious disease   Leukocytosis is improving  Influenza type A positive  Blood cultures pending, currently negative  MSRA negative.   Plan   - continue Merrem, Doxycycline, and Tamiflu        Heme/Onc   Hemoglobin and platelets are stable.  Plan   - I recommend PRBC transfusion if hemoglobin is less than 7 g/dL unless active bleeding or cardiac ischemia.  - Continue with heparin subcutaneous for DVT prophylaxis.  - Hold chemical anticoagulation if platelet count is less than 50,000.        Musculoskeletal   Plan   - Turn the patient every 2 hours to prevent pressure ulcers.        Activity   - I recommend aggressive PTOT while in hospital to avoid critical care neuropathy/myopathy.        Lines:   Single Lumen Implantable Port Left Subclavian  Peripheral IV 02/27/19 1408 Anterior;Left;Upper Arm  Gastrostomy/Enterostomy Gastrostomy-jejunostomy  Urethral Catheter Silicone 16 Fr.  ETT           Quality measure:  1. Elevation of the head of the bed to 30-45 degrees- yes  2. Daily sedation vacation and daily assessment of readiness to extubate-yes  3. Peptic ulcer disease prophylaxis- yes  4. Deep venous thrombosis prophylaxis- yes (chemical)  5. Indwelling urinary catheter - required for accurate urine output.        Code status: No CPR, cardioversion/defibrillation.         Emergency contacts listed as Damaris Mercado (mother).                 Patient Active Problem List   Diagnosis Code   • Acute respiratory  failure with hypoxia (CMS/[facility]) J96.01   • Septic shock (CMS/[facility]) A41.9, R65.21   • Bilateral pneumonia J18.9   • Bacteremia R78.81         Bedside rounds were completed with RN and RRT, discussed case and plan in great detail.      ALISON Vences  03/05/19  2:08 PM        Attestation: Scribed for Shayy Pina, ALISON    The clinical plan was discussed extensively with the nurse, and respiratory therapist.     Critical Care time spent in direct patient care: 35 minutes (excluding procedure time) including high complexity decision making to assess, and support vital organ system failure in this individual who has impairment of one or more vital organ systems such that there is a high probability of imminent or life threatening deterioration in the patient’s condition.     Patient is critically ill and is at higher risk for further mortality/morbidity. Continue ICU care.        I, Billy Skaggs M.D. attest that the above note accurately reflects the work and decisions made by me. Patient was seen and evaluated by me, including history of present illness, physical exam, assessment, and treatment plan.  The above note was reviewed and edited by me.        Billy Skaggs M.D  Pulmonary and Critical Care Medicine

## 2019-03-06 ENCOUNTER — APPOINTMENT (OUTPATIENT)
Dept: GENERAL RADIOLOGY | Facility: HOSPITAL | Age: 19
End: 2019-03-06

## 2019-03-06 VITALS
HEIGHT: 55 IN | HEART RATE: 68 BPM | TEMPERATURE: 98.9 F | WEIGHT: 100 LBS | SYSTOLIC BLOOD PRESSURE: 94 MMHG | BODY MASS INDEX: 23.14 KG/M2 | DIASTOLIC BLOOD PRESSURE: 58 MMHG | OXYGEN SATURATION: 97 % | RESPIRATION RATE: 17 BRPM

## 2019-03-06 LAB
A-A DO2: >300 MMHG (ref 0–300)
A-A DO2: >300 MMHG (ref 0–300)
ALBUMIN SERPL-MCNC: 3 G/DL (ref 3.5–5)
ALBUMIN/GLOB SERPL: 1.3 G/DL (ref 1.5–2.5)
ALP SERPL-CCNC: 107 U/L (ref 35–104)
ALT SERPL W P-5'-P-CCNC: 20 U/L (ref 10–36)
ANION GAP SERPL CALCULATED.3IONS-SCNC: 8.7 MMOL/L (ref 3.6–11.2)
ARTERIAL PATENCY WRIST A: POSITIVE
ARTERIAL PATENCY WRIST A: POSITIVE
AST SERPL-CCNC: 43 U/L (ref 10–30)
ATMOSPHERIC PRESS: 732 MMHG
ATMOSPHERIC PRESS: 736 MMHG
BASE EXCESS BLDA CALC-SCNC: 5.5 MMOL/L
BASE EXCESS BLDA CALC-SCNC: 7 MMOL/L
BDY SITE: ABNORMAL
BDY SITE: ABNORMAL
BILIRUB SERPL-MCNC: 0.3 MG/DL (ref 0.2–1.8)
BODY TEMPERATURE: 98.6 C
BODY TEMPERATURE: 98.6 C
BUN BLD-MCNC: 12 MG/DL (ref 7–21)
BUN/CREAT SERPL: 63.2 (ref 7–25)
CALCIUM SPEC-SCNC: 8.4 MG/DL (ref 7.7–10)
CHLORIDE SERPL-SCNC: 107 MMOL/L (ref 99–112)
CO2 SERPL-SCNC: 28.3 MMOL/L (ref 24.3–31.9)
COHGB MFR BLD: 0.2 % (ref 0–5)
COHGB MFR BLD: 0.8 % (ref 0–5)
CREAT BLD-MCNC: 0.19 MG/DL (ref 0.43–1.29)
CRP SERPL-MCNC: 1.15 MG/DL (ref 0–0.99)
DEPRECATED RDW RBC AUTO: 48.1 FL (ref 37–54)
ERYTHROCYTE [DISTWIDTH] IN BLOOD BY AUTOMATED COUNT: 12.9 % (ref 11.5–14.5)
GFR SERPL CREATININE-BSD FRML MDRD: >150 ML/MIN/1.73
GLOBULIN UR ELPH-MCNC: 2.3 GM/DL
GLUCOSE BLD-MCNC: 158 MG/DL (ref 70–110)
GLUCOSE BLDC GLUCOMTR-MCNC: 103 MG/DL (ref 70–130)
GLUCOSE BLDC GLUCOMTR-MCNC: 123 MG/DL (ref 70–130)
GLUCOSE BLDC GLUCOMTR-MCNC: 138 MG/DL (ref 70–130)
GLUCOSE BLDC GLUCOMTR-MCNC: 142 MG/DL (ref 70–130)
HCO3 BLDA-SCNC: 29.9 MMOL/L (ref 22–26)
HCO3 BLDA-SCNC: 30.4 MMOL/L (ref 22–26)
HCT VFR BLD AUTO: 33.4 % (ref 37–47)
HCT VFR BLD CALC: 32 % (ref 37–47)
HCT VFR BLD CALC: 34 % (ref 37–47)
HGB BLD-MCNC: 10.1 G/DL (ref 12–16)
HGB BLDA-MCNC: 10.9 G/DL (ref 12–16)
HGB BLDA-MCNC: 11.7 G/DL (ref 12–16)
HOROWITZ INDEX BLD+IHG-RTO: 65 %
HOROWITZ INDEX BLD+IHG-RTO: 65 %
HYPOCHROMIA BLD QL: ABNORMAL
LARGE PLATELETS: ABNORMAL
LYMPHOCYTES # BLD MANUAL: 4.12 10*3/MM3 (ref 1–3)
LYMPHOCYTES NFR BLD MANUAL: 11 % (ref 0–10)
LYMPHOCYTES NFR BLD MANUAL: 30 % (ref 21–51)
MACROCYTES BLD QL SMEAR: ABNORMAL
MAGNESIUM SERPL-MCNC: 2.6 MG/DL (ref 1.6–2.2)
MCH RBC QN AUTO: 31.9 PG (ref 27–33)
MCHC RBC AUTO-ENTMCNC: 30.2 G/DL (ref 33–37)
MCV RBC AUTO: 105.4 FL (ref 80–94)
METAMYELOCYTES NFR BLD MANUAL: 8 % (ref 0–0)
METHGB BLD QL: 0.3 % (ref 0–3)
METHGB BLD QL: 0.3 % (ref 0–3)
MODALITY: ABNORMAL
MODALITY: ABNORMAL
MONOCYTES # BLD AUTO: 1.51 10*3/MM3 (ref 0.1–0.9)
NEUTROPHILS # BLD AUTO: 7 10*3/MM3 (ref 1.4–6.5)
NEUTROPHILS NFR BLD MANUAL: 49 % (ref 30–70)
NEUTS BAND NFR BLD MANUAL: 2 % (ref 4–12)
NOTE: ABNORMAL
NRBC SPEC MANUAL: 1 /100 WBC (ref 0–0)
OSMOLALITY SERPL CALC.SUM OF ELEC: 289.9 MOSM/KG (ref 273–305)
OXYHGB MFR BLDV: 87.1 % (ref 85–100)
OXYHGB MFR BLDV: 91.4 % (ref 85–100)
PCO2 BLDA: 38.8 MM HG (ref 35–45)
PCO2 BLDA: 42.7 MM HG (ref 35–45)
PCO2 TEMP ADJ BLD: ABNORMAL MM HG (ref 35–45)
PEEP RESPIRATORY: 10 CM[H2O]
PEEP RESPIRATORY: 8 CM[H2O]
PH BLDA: 7.46 PH UNITS (ref 7.35–7.45)
PH BLDA: 7.51 PH UNITS (ref 7.35–7.45)
PH, TEMP CORRECTED: ABNORMAL PH UNITS (ref 7.35–7.45)
PLATELET # BLD AUTO: 360 10*3/MM3 (ref 130–400)
PMV BLD AUTO: 12.1 FL (ref 6–10)
PO2 BLDA: 58.8 MM HG (ref 80–100)
PO2 BLDA: 65.3 MM HG (ref 80–100)
PO2 TEMP ADJ BLD: ABNORMAL MM HG (ref 83–108)
POTASSIUM BLD-SCNC: 3.7 MMOL/L (ref 3.5–5.3)
PROT SERPL-MCNC: 5.3 G/DL (ref 6–8)
RBC # BLD AUTO: 3.17 10*6/MM3 (ref 4.2–5.4)
SAO2 % BLDCOA: 87.5 % (ref 90–100)
SAO2 % BLDCOA: 92.4 % (ref 90–100)
SCAN SLIDE: NORMAL
SET MECH RESP RATE: 15
SET MECH RESP RATE: 18
SODIUM BLD-SCNC: 144 MMOL/L (ref 135–153)
VENTILATOR MODE: ABNORMAL
VENTILATOR MODE: AC
VT ON VENT VENT: 330 ML
VT ON VENT VENT: 330 ML
WBC NRBC COR # BLD: 13.72 10*3/MM3 (ref 4.5–12.5)

## 2019-03-06 PROCEDURE — 82805 BLOOD GASES W/O2 SATURATION: CPT | Performed by: INTERNAL MEDICINE

## 2019-03-06 PROCEDURE — 82375 ASSAY CARBOXYHB QUANT: CPT | Performed by: NURSE PRACTITIONER

## 2019-03-06 PROCEDURE — 82375 ASSAY CARBOXYHB QUANT: CPT | Performed by: INTERNAL MEDICINE

## 2019-03-06 PROCEDURE — 83050 HGB METHEMOGLOBIN QUAN: CPT | Performed by: INTERNAL MEDICINE

## 2019-03-06 PROCEDURE — 94799 UNLISTED PULMONARY SVC/PX: CPT

## 2019-03-06 PROCEDURE — 85025 COMPLETE CBC W/AUTO DIFF WBC: CPT | Performed by: HOSPITALIST

## 2019-03-06 PROCEDURE — 85007 BL SMEAR W/DIFF WBC COUNT: CPT | Performed by: HOSPITALIST

## 2019-03-06 PROCEDURE — 25010000002 HYDROCORTISONE SODIUM SUCCINATE 100 MG RECONSTITUTED SOLUTION: Performed by: INTERNAL MEDICINE

## 2019-03-06 PROCEDURE — 99233 SBSQ HOSP IP/OBS HIGH 50: CPT | Performed by: HOSPITALIST

## 2019-03-06 PROCEDURE — 80053 COMPREHEN METABOLIC PANEL: CPT | Performed by: HOSPITALIST

## 2019-03-06 PROCEDURE — 71045 X-RAY EXAM CHEST 1 VIEW: CPT | Performed by: RADIOLOGY

## 2019-03-06 PROCEDURE — 25010000002 PROPOFOL 1000 MG/ML EMULSION: Performed by: PHYSICIAN ASSISTANT

## 2019-03-06 PROCEDURE — 94003 VENT MGMT INPAT SUBQ DAY: CPT

## 2019-03-06 PROCEDURE — 99291 CRITICAL CARE FIRST HOUR: CPT | Performed by: INTERNAL MEDICINE

## 2019-03-06 PROCEDURE — 36600 WITHDRAWAL OF ARTERIAL BLOOD: CPT | Performed by: NURSE PRACTITIONER

## 2019-03-06 PROCEDURE — 83735 ASSAY OF MAGNESIUM: CPT | Performed by: HOSPITALIST

## 2019-03-06 PROCEDURE — 25010000002 HEPARIN (PORCINE) PER 1000 UNITS: Performed by: INTERNAL MEDICINE

## 2019-03-06 PROCEDURE — 25010000002 MEROPENEM

## 2019-03-06 PROCEDURE — 82805 BLOOD GASES W/O2 SATURATION: CPT | Performed by: NURSE PRACTITIONER

## 2019-03-06 PROCEDURE — 82962 GLUCOSE BLOOD TEST: CPT

## 2019-03-06 PROCEDURE — 83050 HGB METHEMOGLOBIN QUAN: CPT | Performed by: NURSE PRACTITIONER

## 2019-03-06 PROCEDURE — 71045 X-RAY EXAM CHEST 1 VIEW: CPT

## 2019-03-06 PROCEDURE — 36600 WITHDRAWAL OF ARTERIAL BLOOD: CPT | Performed by: INTERNAL MEDICINE

## 2019-03-06 PROCEDURE — 25010000002 HYDROCORTISONE SODIUM SUCCINATE 100 MG RECONSTITUTED SOLUTION: Performed by: HOSPITALIST

## 2019-03-06 PROCEDURE — 86140 C-REACTIVE PROTEIN: CPT | Performed by: NURSE PRACTITIONER

## 2019-03-06 PROCEDURE — 25010000002 FUROSEMIDE PER 20 MG: Performed by: INTERNAL MEDICINE

## 2019-03-06 RX ORDER — ATROPINE SULFATE 0.4 MG/ML
0.5 AMPUL (ML) INJECTION AS NEEDED
Start: 2019-03-06

## 2019-03-06 RX ORDER — DIPHENHYDRAMINE, LIDOCAINE, NYSTATIN
5 KIT ORAL 4 TIMES DAILY
Start: 2019-03-07

## 2019-03-06 RX ORDER — NICOTINE POLACRILEX 4 MG
15 LOZENGE BUCCAL
Start: 2019-03-06

## 2019-03-06 RX ORDER — CASTOR OIL AND BALSAM, PERU 788; 87 MG/G; MG/G
OINTMENT TOPICAL EVERY 12 HOURS SCHEDULED
Status: DISCONTINUED | OUTPATIENT
Start: 2019-03-06 | End: 2019-03-07 | Stop reason: HOSPADM

## 2019-03-06 RX ORDER — ACETAMINOPHEN 160 MG/5ML
650 SOLUTION ORAL EVERY 6 HOURS PRN
Start: 2019-03-06

## 2019-03-06 RX ORDER — L.ACID,PARA/B.BIFIDUM/S.THERM 8B CELL
1 CAPSULE ORAL DAILY
Start: 2019-03-07

## 2019-03-06 RX ORDER — ATROPINE SULFATE 0.4 MG/ML
0.1 AMPUL (ML) INJECTION AS NEEDED
Status: DISCONTINUED | OUTPATIENT
Start: 2019-03-06 | End: 2019-03-06

## 2019-03-06 RX ORDER — IPRATROPIUM BROMIDE AND ALBUTEROL SULFATE 2.5; .5 MG/3ML; MG/3ML
3 SOLUTION RESPIRATORY (INHALATION)
Qty: 360 ML
Start: 2019-03-07

## 2019-03-06 RX ORDER — LANSOPRAZOLE
30 KIT 2 TIMES DAILY
Qty: 300 ML
Start: 2019-03-07

## 2019-03-06 RX ORDER — GLYCOPYRROLATE 1 MG/1
0.5 TABLET ORAL EVERY 8 HOURS SCHEDULED
Start: 2019-03-07

## 2019-03-06 RX ORDER — HEPARIN SODIUM 5000 [USP'U]/ML
5000 INJECTION, SOLUTION INTRAVENOUS; SUBCUTANEOUS EVERY 8 HOURS SCHEDULED
Start: 2019-03-07

## 2019-03-06 RX ORDER — POTASSIUM CHLORIDE 20MEQ/15ML
20 LIQUID (ML) ORAL ONCE
Status: COMPLETED | OUTPATIENT
Start: 2019-03-06 | End: 2019-03-06

## 2019-03-06 RX ORDER — LEVOTHYROXINE SODIUM ANHYDROUS 100 UG/5ML
50 INJECTION, POWDER, LYOPHILIZED, FOR SOLUTION INTRAVENOUS
Qty: 30 EACH
Start: 2019-03-07

## 2019-03-06 RX ORDER — CHLORHEXIDINE GLUCONATE 0.12 MG/ML
15 RINSE ORAL EVERY 12 HOURS SCHEDULED
Start: 2019-03-07

## 2019-03-06 RX ORDER — PHENYTOIN 50 MG/1
100 TABLET, CHEWABLE ORAL EVERY 8 HOURS SCHEDULED
Status: DISCONTINUED | OUTPATIENT
Start: 2019-03-06 | End: 2019-03-07 | Stop reason: HOSPADM

## 2019-03-06 RX ORDER — FUROSEMIDE 10 MG/ML
20 INJECTION INTRAMUSCULAR; INTRAVENOUS ONCE
Status: COMPLETED | OUTPATIENT
Start: 2019-03-06 | End: 2019-03-06

## 2019-03-06 RX ORDER — ATROPINE SULFATE 0.4 MG/ML
0.5 AMPUL (ML) INJECTION AS NEEDED
Status: DISCONTINUED | OUTPATIENT
Start: 2019-03-06 | End: 2019-03-07 | Stop reason: HOSPADM

## 2019-03-06 RX ORDER — DEXTROSE MONOHYDRATE 25 G/50ML
25 INJECTION, SOLUTION INTRAVENOUS
Start: 2019-03-06

## 2019-03-06 RX ORDER — LORAZEPAM 2 MG/ML
2 INJECTION INTRAMUSCULAR EVERY 6 HOURS PRN
Start: 2019-03-06 | End: 2019-03-14

## 2019-03-06 RX ADMIN — Medication 1 CAPSULE: at 08:12

## 2019-03-06 RX ADMIN — CLONAZEPAM 2 MG: 1 TABLET ORAL at 16:25

## 2019-03-06 RX ADMIN — DOXYCYCLINE 100 MG: 100 INJECTION, POWDER, LYOPHILIZED, FOR SOLUTION INTRAVENOUS at 22:00

## 2019-03-06 RX ADMIN — Medication 5 ML: at 08:12

## 2019-03-06 RX ADMIN — IPRATROPIUM BROMIDE AND ALBUTEROL SULFATE 3 ML: .5; 3 SOLUTION RESPIRATORY (INHALATION) at 00:51

## 2019-03-06 RX ADMIN — Medication 5 ML: at 17:32

## 2019-03-06 RX ADMIN — POTASSIUM CHLORIDE 20 MEQ: 20 SOLUTION ORAL at 22:04

## 2019-03-06 RX ADMIN — IPRATROPIUM BROMIDE AND ALBUTEROL SULFATE 3 ML: .5; 3 SOLUTION RESPIRATORY (INHALATION) at 18:35

## 2019-03-06 RX ADMIN — HEPARIN SODIUM 5000 UNITS: 5000 INJECTION INTRAVENOUS; SUBCUTANEOUS at 21:59

## 2019-03-06 RX ADMIN — CLONAZEPAM 2 MG: 1 TABLET ORAL at 21:58

## 2019-03-06 RX ADMIN — HYDROCORTISONE SODIUM SUCCINATE 100 MG: 100 INJECTION, POWDER, FOR SOLUTION INTRAMUSCULAR; INTRAVENOUS at 04:50

## 2019-03-06 RX ADMIN — METRONIDAZOLE 500 MG: 500 INJECTION, SOLUTION INTRAVENOUS at 11:11

## 2019-03-06 RX ADMIN — MEROPENEM 1 G: 1 INJECTION, POWDER, FOR SOLUTION INTRAVENOUS at 08:44

## 2019-03-06 RX ADMIN — CLONAZEPAM 2 MG: 1 TABLET ORAL at 08:12

## 2019-03-06 RX ADMIN — GLYCOPYRROLATE 0.5 MG: 1 TABLET ORAL at 17:32

## 2019-03-06 RX ADMIN — IPRATROPIUM BROMIDE AND ALBUTEROL SULFATE 3 ML: .5; 3 SOLUTION RESPIRATORY (INHALATION) at 12:50

## 2019-03-06 RX ADMIN — SODIUM CHLORIDE, PRESERVATIVE FREE 3 ML: 5 INJECTION INTRAVENOUS at 08:13

## 2019-03-06 RX ADMIN — NOREPINEPHRINE BITARTRATE 0.08 MCG/KG/MIN: 1 INJECTION INTRAVENOUS at 23:26

## 2019-03-06 RX ADMIN — LACOSAMIDE 200 MG: 50 TABLET, FILM COATED ORAL at 21:58

## 2019-03-06 RX ADMIN — PROPOFOL 20 MCG/KG/MIN: 10 INJECTION, EMULSION INTRAVENOUS at 20:28

## 2019-03-06 RX ADMIN — GLYCOPYRROLATE 0.5 MG: 1 TABLET ORAL at 05:00

## 2019-03-06 RX ADMIN — VIGABATRIN 1000 MG: 500 POWDER, FOR SOLUTION ORAL at 16:26

## 2019-03-06 RX ADMIN — FUROSEMIDE 20 MG: 10 INJECTION, SOLUTION INTRAMUSCULAR; INTRAVENOUS at 11:10

## 2019-03-06 RX ADMIN — LANSOPRAZOLE 30 MG: KIT at 08:12

## 2019-03-06 RX ADMIN — PHENYTOIN 100 MG: 50 TABLET, CHEWABLE ORAL at 21:59

## 2019-03-06 RX ADMIN — GLYCOPYRROLATE 0.5 MG: 1 TABLET ORAL at 21:58

## 2019-03-06 RX ADMIN — SODIUM CHLORIDE, PRESERVATIVE FREE 10 ML: 5 INJECTION INTRAVENOUS at 22:02

## 2019-03-06 RX ADMIN — Medication 5 ML: at 11:12

## 2019-03-06 RX ADMIN — CASTOR OIL AND BALSAM, PERU: 788; 87 OINTMENT TOPICAL at 14:55

## 2019-03-06 RX ADMIN — SODIUM CHLORIDE, PRESERVATIVE FREE 3 ML: 5 INJECTION INTRAVENOUS at 22:01

## 2019-03-06 RX ADMIN — DOXYCYCLINE 100 MG: 100 INJECTION, POWDER, LYOPHILIZED, FOR SOLUTION INTRAVENOUS at 11:11

## 2019-03-06 RX ADMIN — CHLORHEXIDINE GLUCONATE 15 ML: 1.2 RINSE ORAL at 08:13

## 2019-03-06 RX ADMIN — Medication 5 ML: at 22:00

## 2019-03-06 RX ADMIN — HYDROCORTISONE SODIUM SUCCINATE 50 MG: 100 INJECTION, POWDER, FOR SOLUTION INTRAMUSCULAR; INTRAVENOUS at 11:11

## 2019-03-06 RX ADMIN — Medication: at 19:03

## 2019-03-06 RX ADMIN — LACOSAMIDE 200 MG: 50 TABLET, FILM COATED ORAL at 08:12

## 2019-03-06 RX ADMIN — CHLORHEXIDINE GLUCONATE 15 ML: 1.2 RINSE ORAL at 22:00

## 2019-03-06 RX ADMIN — LACOSAMIDE 200 MG: 50 TABLET, FILM COATED ORAL at 16:25

## 2019-03-06 RX ADMIN — LANSOPRAZOLE 30 MG: KIT at 21:59

## 2019-03-06 RX ADMIN — METRONIDAZOLE 500 MG: 500 INJECTION, SOLUTION INTRAVENOUS at 19:36

## 2019-03-06 RX ADMIN — HYDROCORTISONE SODIUM SUCCINATE 50 MG: 100 INJECTION, POWDER, FOR SOLUTION INTRAMUSCULAR; INTRAVENOUS at 22:00

## 2019-03-06 RX ADMIN — CASTOR OIL AND BALSAM, PERU: 788; 87 OINTMENT TOPICAL at 22:01

## 2019-03-06 RX ADMIN — MEROPENEM 1 G: 1 INJECTION, POWDER, FOR SOLUTION INTRAVENOUS at 00:43

## 2019-03-06 RX ADMIN — HEPARIN SODIUM 5000 UNITS: 5000 INJECTION INTRAVENOUS; SUBCUTANEOUS at 14:55

## 2019-03-06 RX ADMIN — VIGABATRIN 1000 MG: 500 POWDER, FOR SOLUTION ORAL at 22:02

## 2019-03-06 RX ADMIN — ATROPINE SULFATE 1 MG: 0.1 INJECTION PARENTERAL at 11:12

## 2019-03-06 RX ADMIN — IPRATROPIUM BROMIDE AND ALBUTEROL SULFATE 3 ML: .5; 3 SOLUTION RESPIRATORY (INHALATION) at 06:57

## 2019-03-06 RX ADMIN — LEVOTHYROXINE SODIUM ANHYDROUS 50 MCG: 100 INJECTION, POWDER, LYOPHILIZED, FOR SOLUTION INTRAVENOUS at 11:11

## 2019-03-06 RX ADMIN — HEPARIN SODIUM 5000 UNITS: 5000 INJECTION INTRAVENOUS; SUBCUTANEOUS at 05:00

## 2019-03-06 RX ADMIN — VIGABATRIN 1000 MG: 500 POWDER, FOR SOLUTION ORAL at 08:12

## 2019-03-06 RX ADMIN — HYDROCORTISONE SODIUM SUCCINATE 50 MG: 100 INJECTION, POWDER, FOR SOLUTION INTRAMUSCULAR; INTRAVENOUS at 16:26

## 2019-03-06 RX ADMIN — PROPOFOL 30 MCG/KG/MIN: 10 INJECTION, EMULSION INTRAVENOUS at 08:44

## 2019-03-06 NOTE — PROGRESS NOTES
[facility] CCU PROGRESS NOTE     Patient Identification:  Name:  Alyson Mercado  Age:  19 y.o.  Sex:  female  :  2000  MRN:  [id number]  Visit Number:  [id number]  ROOM: [address]     Primary Care Provider:  William Zaldivar MD    Length of stay:  7       Subjective        Chief Compliant Follow up for pneumonia, shock, seizure    Patient seen and examined this afternoon with REFUGIO weeks at the bedside.  No family at the bedside.  Patient awake with L eye open.  on Levophed after the seizure episode yesterday . No further seizure activity. Diarrhea resolved. Afebrile overnight.  FiO2 stable at 65%.  Tolerating tube feeding. On propofol and fentanyl gtt.      Objective     Current Hospital Meds:    chlorhexidine 15 mL Mouth/Throat Q12H   clonazePAM 2 mg Per J Tube TID   doxycycline 100 mg Intravenous Q12H   glycopyrrolate 0.5 mg Oral Q8H   heparin (porcine) 5,000 Units Subcutaneous Q8H   hydrocortisone sodium succinate 100 mg Intravenous Q8H   insulin aspart 0-9 Units Subcutaneous 4x Daily AC & at Bedtime   ipratropium-albuterol 3 mL Nebulization Q6H - RT   lacosamide 200 mg Per J Tube TID   lactobacillus acidophilus 1 capsule Oral Daily   lansoprazole 30 mg Per G Tube BID   levothyroxine sodium 50 mcg Intravenous Daily   meropenem 1 g Intravenous Q8H   nystatin susp + lidocaine viscous 5 mL Swish & Swallow 4x Daily   sodium chloride 10 mL Intravenous Q12H   sodium chloride 3 mL Intravenous Q12H   Vigabatrin 1,000 mg Per PEG Tube TID       fentaNYL (SUBLIMAZE) PCA 1500 mcg/30 mL syringe     norepinephrine 0.02-0.3 mcg/kg/min Last Rate: 0.08 mcg/kg/min (19 5510)   propofol 5-50 mcg/kg/min Last Rate: 25 mcg/kg/min (19 1403)     ----------------------------------------------------------------------------------------------------------------------  Vital Signs:  Temp:  [98.3 °F (36.8 °C)-99.1 °F (37.3 °C)] 98.6 °F (37 °C)  Heart Rate:  [] 68  Resp:  [16-22] 17  BP:  ()/(45-89) 115/85  FiO2 (%):  [65 %] 65 %  SpO2:  [86 %-98 %] 94 %  on   ;   Device (Oxygen Therapy): ventilator  Body mass index is 30.52 kg/m².    Wt Readings from Last 3 Encounters:   03/05/19 45.4 kg (100 lb)       Intake/Output Summary (Last 24 hours) at 3/5/2019 2026  Last data filed at 3/5/2019 1730  Gross per 24 hour   Intake 2041.95 ml   Output 1280 ml   Net 761.95 ml     NPO Diet  ----------------------------------------------------------------------------------------------------------------------  Physical exam:  General: Comfortable, Not in distress.    HENT:  Head:  atraumatic.  Mouth:  Moist mucous membranes.    Eyes:  Conjunctivae and EOM are normal.  Pupils are equal, round, and reactive to light left eye. right eye closed. No scleral icterus.    Neck:  Neck supple.  No JVD present.  trachea midline.  Cardiovascular: tachycardia, regular rhythm with no murmur.  Pulmonary/Chest:  No respiratory distress, + exp wheezes b/l, no crackles, with coarse breath sounds and good air movement.  Abdomen:  Soft.  Mild distension. Bowel sounds are normal.  J tube + with minimal erythema.  Musculoskeletal:  Contractures + in all the four extremities.   Neurological:    Skin:  Skin is warm and dry. No rash noted. No pallor.   Peripheral vascular:  pulses in all 4 extremities with no clubbing, no cyanosis, no edema.  Genitourinary: jeffers +  ----------------------------------------------------------------------------------------------------------------------  ----------------------------------------------------------------------------------------------------------------------  Results from last 7 days   Lab Units 03/05/19  1044 03/05/19  0214 03/04/19  1124 03/04/19  0251 03/03/19  0126   CRP mg/dL  --  2.35*  --   --   --    LACTATE mmol/L 1.2  --  0.7  --   --    WBC 10*3/mm3  --  17.18*  --  22.76* 12.55*   HEMOGLOBIN g/dL  --  11.1*  --  11.4* 11.3*   HEMATOCRIT %  --  34.8*  --  35.9* 34.0*   MCV fL  --   105.1*  --  103.2* 98.3*   MCHC g/dL  --  31.9*  --  31.8* 33.2   PLATELETS 10*3/mm3  --  329  --  265 170     Results from last 7 days   Lab Units 03/05/19  0214 03/04/19  0251 03/03/19  0126  02/28/19  0738   SODIUM mmol/L 148 148 150   < >  --    POTASSIUM mmol/L 4.6 4.6 4.3   < > 2.6*   MAGNESIUM mg/dL  --   --   --   --  1.6   CHLORIDE mmol/L 109 111 116*   < >  --    CO2 mmol/L 27.3 29.1 26.2   < >  --    BUN mg/dL 13 12 10   < >  --    CREATININE mg/dL 0.21* 0.16* 0.27*   < >  --    EGFR IF NONAFRICN AM mL/min/1.73 >150 >150 >150   < >  --    CALCIUM mg/dL 8.6 8.2 7.6*   < >  --    GLUCOSE mg/dL 133* 145* 187*   < >  --     < > = values in this interval not displayed.   Estimated Creatinine Clearance: 253 mL/min (A) (by C-G formula based on SCr of 0.21 mg/dL (L)).      Glucose   Date/Time Value Ref Range Status   03/05/2019 1727 117 70 - 130 mg/dL Final   03/05/2019 1157 129 70 - 130 mg/dL Final   03/05/2019 0535 140 (H) 70 - 130 mg/dL Final   03/04/2019 1952 169 (H) 70 - 130 mg/dL Final   03/04/2019 1804 105 70 - 130 mg/dL Final   03/04/2019 1100 101 70 - 130 mg/dL Final   03/04/2019 0610 147 (H) 70 - 130 mg/dL Final   03/03/2019 2038 131 (H) 70 - 130 mg/dL Final     No results found for: AMMONIA        Blood Culture   Date Value Ref Range Status   02/28/2019 No growth at 3 days  Preliminary   02/26/2019 Staphylococcus, coagulase negative (A)  Final     Comment:     Probable Contaminant.     02/26/2019 No growth at 5 days  Final      Urine Culture   Date Value Ref Range Status   02/26/2019 No growth  Final        I have personally looked at the labs and they are summarized above.  ----------------------------------------------------------------------------------------------------------------------  Imaging Results (last 24 hours)     Procedure Component Value Units Date/Time    XR Chest 1 View [[id number]] Collected:  03/05/19 0907     Updated:  03/05/19 0910    Narrative:       EXAMINATION: XR CHEST 1 VW-       CLINICAL INDICATION:     Shortness of breath; J96.01-Acute respiratory  failure with hypoxia; I95.9-Hypotension, unspecified; A41.9-Sepsis,  unspecified organism; J18.9-Pneumonia, unspecified organism;  N30.00-Acute cystitis without hematuria     TECHNIQUE:  XR CHEST 1 VW-      COMPARISON: 3/4/2019      FINDINGS:   ET tube with tip above the clavicles. Left-sided Port-A-Cath stable.     Stable opacities both lungs. Low volumes again noted. Heart size stable.  No effusion or pneumothorax.       Impression:       1. Support devices as above.  2. Stable bilateral airspace disease.     This report was finalized on 3/5/2019 9:08 AM by Dr. Matthew Juárez MD.           I have personally reviewed the radiology images and read the final radiology report.    Assessment & Plan      Assessment:  Septic Shock  B/L Pneumonia  Influenza A  Acute hypoxic respiratory failure  Diarrhea  Dilantin toxicity  Intractable complex partial seizure  Acute metabolic encephalopathy from sepsis  Cerebral Palsy    Plan:  Septic shock secondary to bilateral pneumonia.  on Levophed, wean off for SBP>90 and MAP>65.  Will start on hydrocortisone. DC solumedrol. Will do one time NS bolus. Continue to monitor blood pressure closely.  Leukocytosis and CRP improving.     For bilateral pneumonia, currently on doxycycline and meropenem.  Get respiratory culture.  ID consulted.  Advised Zyvox and cefepime.  Patient has history of anaphylactic reaction to Rocephin so cefepime cannot be given.  Zyvox has adverse reaction of seizure.  Discussed with Julia this morning reg the abx choices.  Awaiting callback.  Meropenem also has adverse reaction of seizures.  MRSA PCR is negative although risk of MRSA pneumonia post influenza is very high.  Other options include telavancin and clindamycin.  Telavancin the same group of vancomycin so risk of allergic reaction is high.  Follow-up respiratory culture and decide on the antibiotics accordingly.    Finished  course of Tamiflu for influenza A.  For acute hypoxic respiratory failure, pulmonology critical care on board.  Continue to wean FiO2.  Continue with duo nebs. Received lasix for the volume overload.  Diarrhea resolved.  Likely secondary to tube feeding.  C. difficile negative.  Dilantin toxicity, levels elevated at the time of admission. Phenytoin level low.   For intractable seizure, continue with Vigabatrin, Vimpat and clonazepam.  Will do as needed Ativan.  Consult neurology.  Lansoprazole for GI prophylaxis  Heparin for DVT prophylaxis.  Will discuss with the Grandmother in the morning reg the further options including if she would like to transfer the patient to the tertiary level care hospital.    The patient is high risk due to the following diagnoses/reasons:  Septic Shock, B/L Pneumonia  Influenza A, Acute hypoxic respiratory failure, Dilantin toxicity, Intractable complex partial seizure    Mariah Barnhart MD  03/05/19  8:26 PM

## 2019-03-06 NOTE — NURSING NOTE
DTI to left nostril treatment ordered     03/06/19 0947   Wound 03/06/19 0907 Left nostril   Date first assessed/Time first assessed: 03/06/19 0907   Present On Admission : no  Side: Left  Location: nostril  Stage, Pressure Injury: deep tissue injury   Dressing Appearance open to air   Base dry;maroon/purple   Periwound intact;dry   Wound Length (cm) 0.3 cm   Wound Width (cm) 0.1 cm   Drainage Amount none

## 2019-03-06 NOTE — PLAN OF CARE
Problem: Patient Care Overview  Goal: Individualization and Mutuality  Outcome: Ongoing (interventions implemented as appropriate)    Goal: Discharge Needs Assessment  Outcome: Ongoing (interventions implemented as appropriate)    Goal: Interprofessional Rounds/Family Conf  Outcome: Ongoing (interventions implemented as appropriate)      Problem: Skin Injury Risk (Adult)  Goal: Identify Related Risk Factors and Signs and Symptoms  Outcome: Ongoing (interventions implemented as appropriate)    Goal: Skin Health and Integrity  Outcome: Ongoing (interventions implemented as appropriate)      Problem: Ventilation, Mechanical Invasive (Adult)  Goal: Signs and Symptoms of Listed Potential Problems Will be Absent, Minimized or Managed (Ventilation, Mechanical Invasive)  Outcome: Ongoing (interventions implemented as appropriate)      Problem: Fall Risk (Adult)  Goal: Identify Related Risk Factors and Signs and Symptoms  Outcome: Ongoing (interventions implemented as appropriate)    Goal: Absence of Fall  Outcome: Ongoing (interventions implemented as appropriate)      Problem: Pneumonia (Adult)  Goal: Signs and Symptoms of Listed Potential Problems Will be Absent, Minimized or Managed (Pneumonia)  Outcome: Ongoing (interventions implemented as appropriate)

## 2019-03-06 NOTE — PROGRESS NOTES
PROGRESS NOTE         Patient Identification:  Name:  Alyson Mercado  Age:  19 y.o.  Sex:  female  :  2000  MRN:  [id number]  Visit Number:  [id number]  Primary Care Provider:  William Zaldivar MD      ----------------------------------------------------------------------------------------------------------------------  Subjective       Chief Complaints:    Loss of Consciousness        Interval History:      Patient remains sedated and ventilated at 65% FiO2. Continues to require Levophed for blood pressure support. CRP improving at 1.15.     Review of Systems:       Unable to obtain. Intubated and sedated.     ----------------------------------------------------------------------------------------------------------------------      Objective       Current Hospital Meds:    castor oil-balsam peru  Topical Q12H   chlorhexidine 15 mL Mouth/Throat Q12H   clonazePAM 2 mg Per J Tube TID   doxycycline 100 mg Intravenous Q12H   glycopyrrolate 0.5 mg Oral Q8H   heparin (porcine) 5,000 Units Subcutaneous Q8H   hydrocortisone sodium succinate 50 mg Intravenous Q6H   insulin aspart 0-9 Units Subcutaneous 4x Daily AC & at Bedtime   ipratropium-albuterol 3 mL Nebulization Q6H - RT   lacosamide 200 mg Per J Tube TID   lactobacillus acidophilus 1 capsule Oral Daily   lansoprazole 30 mg Per G Tube BID   levothyroxine sodium 50 mcg Intravenous Daily   metroNIDAZOLE 500 mg Intravenous Q8H   nystatin susp + lidocaine viscous 5 mL Swish & Swallow 4x Daily   sodium chloride 10 mL Intravenous Q12H   sodium chloride 3 mL Intravenous Q12H   Vigabatrin 1,000 mg Per PEG Tube TID       fentaNYL (SUBLIMAZE) PCA 1500 mcg/30 mL syringe     norepinephrine 0.02-0.3 mcg/kg/min Last Rate: Stopped (19)   propofol 5-50 mcg/kg/min Last Rate: 30 mcg/kg/min (19)     ----------------------------------------------------------------------------------------------------------------------    Vital Signs:  Temp:   [97.9 °F (36.6 °C)-98.9 °F (37.2 °C)] 97.9 °F (36.6 °C)  Heart Rate:  [] 78  Resp:  [15-23] 15  BP: ()/(53-97) 118/91  FiO2 (%):  [65 %] 65 %  Mean Arterial Pressure (Non-Invasive) for the past 24 hrs (Last 3 readings):   Noninvasive MAP (mmHg)   03/06/19 1100 101   03/06/19 1014 96   03/06/19 1000 67     SpO2 Percentage    03/06/19 1000 03/06/19 1014 03/06/19 1100   SpO2: 95% 96% 93%     SpO2:  [91 %-97 %] 93 %  on   ;   Device (Oxygen Therapy): ventilator    Body mass index is 30.52 kg/m².  Wt Readings from Last 3 Encounters:   03/05/19 45.4 kg (100 lb)        Intake/Output Summary (Last 24 hours) at 3/6/2019 1242  Last data filed at 3/6/2019 1110  Gross per 24 hour   Intake 889.61 ml   Output 1150 ml   Net -260.39 ml     NPO Diet  ----------------------------------------------------------------------------------------------------------------------    Physical exam:    Constitutional:  Intubated and sedated.   HENT:  Head: Normocephalic and atraumatic.  Mouth:  Moist mucous membranes.    Eyes:  Conjunctivae and EOM are normal.  No scleral icterus.  Neck:  Neck supple.  No JVD present.    Cardiovascular:  Normal rate, regular rhythm and normal heart sounds with no murmur. No edema.  Pulmonary/Chest: Bilateral scattered rhonchi.   Abdominal:  Soft.  Bowel sounds are normal.  No distension and no tenderness.   Musculoskeletal:  No edema, no tenderness, and no deformity.  No swelling or redness of joints.  Neurological:  Intubated and sedated.   Skin:  Skin is warm and dry.  No rash noted.  No pallor.   Psychiatric:   Intubated and sedated.     ----------------------------------------------------------------------------------------------------------------------    ----------------------------------------------------------------------------------------------------------------------          Results from last 7 days   Lab Units 03/06/19  1148   PH, ARTERIAL pH units 7.463*   PO2 ART mm Hg 58.8*   PCO2,  ARTERIAL mm Hg 42.7   HCO3 ART mmol/L 29.9*     Results from last 7 days   Lab Units 03/06/19  0232 03/05/19  1044 03/05/19  0214 03/04/19  1124 03/04/19  0251   CRP mg/dL 1.15*  --  2.35*  --   --    LACTATE mmol/L  --  1.2  --  0.7  --    WBC 10*3/mm3 13.72*  --  17.18*  --  22.76*   HEMOGLOBIN g/dL 10.1*  --  11.1*  --  11.4*   HEMATOCRIT % 33.4*  --  34.8*  --  35.9*   MCV fL 105.4*  --  105.1*  --  103.2*   MCHC g/dL 30.2*  --  31.9*  --  31.8*   PLATELETS 10*3/mm3 360  --  329  --  265     Results from last 7 days   Lab Units 03/06/19  0232 03/05/19  0214 03/04/19 0251 02/28/19  0738   SODIUM mmol/L 144 148 148   < >  --    POTASSIUM mmol/L 3.7 4.6 4.6   < > 2.6*   MAGNESIUM mg/dL  --   --   --   --  1.6   CHLORIDE mmol/L 107 109 111   < >  --    CO2 mmol/L 28.3 27.3 29.1   < >  --    BUN mg/dL 12 13 12   < >  --    CREATININE mg/dL 0.19* 0.21* 0.16*   < >  --    EGFR IF NONAFRICN AM mL/min/1.73 >150 >150 >150   < >  --    CALCIUM mg/dL 8.4 8.6 8.2   < >  --    GLUCOSE mg/dL 158* 133* 145*   < >  --    ALBUMIN g/dL 3.00*  --   --   --   --    BILIRUBIN mg/dL 0.3  --   --   --   --    ALK PHOS U/L 107*  --   --   --   --    AST (SGOT) U/L 43*  --   --   --   --    ALT (SGPT) U/L 20  --   --   --   --     < > = values in this interval not displayed.   Estimated Creatinine Clearance: 279.6 mL/min (A) (by C-G formula based on SCr of 0.19 mg/dL (L)).  No results found for: AMMONIA    Glucose   Date/Time Value Ref Range Status   03/06/2019 1156 103 70 - 130 mg/dL Final   03/06/2019 0509 142 (H) 70 - 130 mg/dL Final   03/05/2019 2051 147 (H) 70 - 130 mg/dL Final   03/05/2019 1727 117 70 - 130 mg/dL Final   03/05/2019 1157 129 70 - 130 mg/dL Final   03/05/2019 0535 140 (H) 70 - 130 mg/dL Final   03/04/2019 1952 169 (H) 70 - 130 mg/dL Final   03/04/2019 1804 105 70 - 130 mg/dL Final     No results found for: HGBA1C  Lab Results   Component Value Date    TSH 0.428 (L) 02/26/2019    FREET4 0.66 (L) 02/26/2019        Blood Culture   Date Value Ref Range Status   02/28/2019 No growth at 4 days  Preliminary   02/26/2019 Staphylococcus, coagulase negative (A)  Final     Comment:     Probable Contaminant.     02/26/2019 No growth at 5 days  Final     Urine Culture   Date Value Ref Range Status   02/26/2019 No growth  Final              Pain Management Panel     Pain Management Panel Latest Ref Rng & Units 2/26/2019 6/3/2015    CREATININE UR mg/dL 83.6 22.1          I have personally reviewed the above laboratory results.   ----------------------------------------------------------------------------------------------------------------------  Imaging Results (last 24 hours)     Procedure Component Value Units Date/Time    XR Chest 1 View [[id number]] Collected:  03/06/19 0929     Updated:  03/06/19 0932    Narrative:       EXAMINATION: XR CHEST 1 VW-      CLINICAL INDICATION:     intubated; J96.01-Acute respiratory failure  with hypoxia; I95.9-Hypotension, unspecified; A41.9-Sepsis, unspecified  organism; J18.9-Pneumonia, unspecified organism; N30.00-Acute cystitis  without hematuria     TECHNIQUE:  XR CHEST 1 VW-      COMPARISON: 3/5/2019      FINDINGS:   ET tube with tip above level of clavicles. Left Port-A-Cath stable in  appearance and positioning.     Improvement in bilateral airspace disease. Improved lung volumes. No  effusion or pneumothorax. Heart size within normal limits.       Impression:       1. Improvement in bilateral airspace disease with improved lung volumes.  2. Support devices as above.     This report was finalized on 3/6/2019 9:30 AM by Dr. Matthew Juárez MD.           I have personally reviewed the above radiology results.   ----------------------------------------------------------------------------------------------------------------------    Assessment/Plan       Assessment/Plan     ASSESSMENT:    1. Septic shock requiring mechanical ventilation and levophed  2. Bilateral pneumonia  3. Bacteremia  versus contaminant    PLAN:    Patient remains sedated and ventilated at 65% FiO2. Continues to require Levophed for blood pressure support. CRP improving at 1.15.      Urine culture from 2/26/19 finalized as no growth. C Difficile negative. Influenza A positive. Chest x-ray from 3/4/2019 revealed bilateral airspace disease, right greater than left. Blood culture from 2/26/19 1 out of 2 sets positive for Coag negative Staphylococcus. MRSA nasal swab negative.     At this time we feel positive blood culture to be a contaminant. CRP ordered for AM.    In the setting of clinical and laboratory improvement with almost normalized CRP, Merrem was de-escalated to Flagyl 500mg IV Q8H, as risk outweighs benefit, to continue with Doxycycline.     Current Antimicrobials:  Flagyl 500mg IV Q8H  Doxycyline 100mg IV Q12H         Code Status:   Code Status and Medical Interventions:   Ordered at: 02/28/19 1200     Limited Support to NOT Include:    Cardioversion/Defibrillation     Level Of Support Discussed With:    Health Care Surrogate     Code Status:    No CPR     Medical Interventions (Level of Support Prior to Arrest):    Limited       ALISON Cardozo  03/06/19  12:42 PM     Physician Attestation:    I have personally seen and examined the patient. I reviewed the patient's data including history of present illness, review of systems, physical examination, assessment and treatment plan and agree with findings above. The assessment and plan are my own.  I have reviewed and edited the note above after discussing the findings with the midlevel.    Vinicio Pastor MD  Infectious Diseases  03/06/19  12:42 PM

## 2019-03-06 NOTE — PROGRESS NOTES
Nurse Practitioner - Brief Progress Note  PERMANENT  03/06/2019 05:46    [facility] ICU [facility]  [facility] - U - 10 - [place], [place] ([facility])    DAPHNEY QUEZADA    Date of Service 03/06/2019 05:46    HPI/Events of Note Comments: RT report ABG on AC/VC 18 330 65% peep8 = 7.51 38 65 30 BE 7 sat 92%     Decrease RR to 15.      Interventions Major-Hypoxemia - evaluation and management, Respiratory failure - evaluation and management        Electronically Signed by: David Arora (NP) on 03/06/2019 05:47

## 2019-03-06 NOTE — PLAN OF CARE
Problem: Patient Care Overview  Goal: Individualization and Mutuality  Outcome: Ongoing (interventions implemented as appropriate)      Problem: Skin Injury Risk (Adult)  Goal: Identify Related Risk Factors and Signs and Symptoms  Outcome: Ongoing (interventions implemented as appropriate)    Goal: Skin Health and Integrity  Outcome: Ongoing (interventions implemented as appropriate)      Problem: Ventilation, Mechanical Invasive (Adult)  Goal: Signs and Symptoms of Listed Potential Problems Will be Absent, Minimized or Managed (Ventilation, Mechanical Invasive)  Outcome: Ongoing (interventions implemented as appropriate)      Problem: Fall Risk (Adult)  Goal: Identify Related Risk Factors and Signs and Symptoms  Outcome: Ongoing (interventions implemented as appropriate)    Goal: Absence of Fall  Outcome: Ongoing (interventions implemented as appropriate)      Problem: Pneumonia (Adult)  Goal: Signs and Symptoms of Listed Potential Problems Will be Absent, Minimized or Managed (Pneumonia)  Outcome: Ongoing (interventions implemented as appropriate)

## 2019-03-07 LAB
BACTERIA SPEC RESP CULT: NO GROWTH
GRAM STN SPEC: NORMAL
GRAM STN SPEC: NORMAL

## 2019-03-07 PROCEDURE — 99239 HOSP IP/OBS DSCHRG MGMT >30: CPT | Performed by: HOSPITALIST

## 2019-03-07 NOTE — DISCHARGE SUMMARY
[facility] HOSPITALISTS DISCHARGE SUMMARY    Patient Identification:  Name:  Alyson Mercado  Age:  19 y.o.  Sex:  female  :  2000  MRN:  [id number]  Visit Number:  [id number]    Date of Admission: 2019  Date of Discharge:  3/7/2019     PCP: William Zaldivar MD    DISCHARGE DIAGNOSIS  Septic Shock  B/L Pneumonia  Influenza A  Acute hypoxic respiratory failure  Diarrhea, improved  Dilantin toxicity  Intractable complex partial seizure  Acute metabolic encephalopathy from sepsis  Cerebral Palsy    CONSULTS   Pulmonology and Critical care  ID  Palliative care    PROCEDURES PERFORMED  Endotracheal Intubation    HOSPITAL COURSE  Ms. Mercado is a 19 y.o. female with a history of CP, and chronic hypoxemic respiratory failure on home O2 use at 2L.  She is known to have history of seizure disorder, baseline does not communicate except by opening her eyes presented to [facility] with loss of consciousness.  Please see the admitting history and physical for further details.   Patient was diagnosed with septic shock secondary to bilateral pneumonia, influenza A and acute on chronic hypoxic respiratory failure.  Pulmonology critical care consulted.  Patient was intubated and mechanically ventilated.  Started on IV antibiotics, Tamiflu and ID consulted.  Resuscitate with IV fluids and Levophed for maintaining pressure.  Blood culture 1 set with staph coag is negative which was likely contamination.  Respiratory culture was negative.  Patient continued to require 65% of FiO2 on vent and PEEP has also increased to 10 from 8.  Continue to require levophed for the BP maintenance.  Patient did have frequent episodes of seizure which was controlled with IV Ativan and propofol GTT.  EEG was done which showed diffuse cortical dysfunction, as seen in toxic metabolic encephalopathic states as well as evidence of partial onset seizure.  Patient was continued on home antiepileptics.  Since patient  septic shock was not improving and hypoxia was not improving and requiring more vent support, it was decided to transfer the patient for higher level of care.  Discussed in detail with the grandmother(POA) and she would like the patient to be transferred to [facility] and patient was transferred in stable condition.  Grandmother wants patient to be no CPR and she does not want tracheostomy.     VITAL SIGNS:  Temp:  [97.6 °F (36.4 °C)-98.9 °F (37.2 °C)] 98.9 °F (37.2 °C)  Heart Rate:  [] 68  Resp:  [15-19] 17  BP: ()/(40-82) 94/58  FiO2 (%):  [70 %] 70 %  SpO2:  [93 %-99 %] 97 %  on   ;   Device (Oxygen Therapy): ventilator    Body mass index is 30.52 kg/m².  Wt Readings from Last 3 Encounters:   03/05/19 45.4 kg (100 lb)       PHYSICAL EXAM:  General: Comfortable, Not in distress.    HENT:  Head:  atraumatic.  Mouth:  Moist mucous membranes.    Eyes:  Conjunctivae and EOM are normal.  Pupils are equal, round, and reactive to light left eye. right eye closed. No scleral icterus.    Neck:  Neck supple.  No JVD present.  trachea midline.  Cardiovascular: tachycardia, regular rhythm with no murmur.  Pulmonary/Chest:  No respiratory distress, no wheezes b/l, no crackles, with coarse breath sounds and good air movement.  Abdomen:  Soft.  Mild distension. Bowel sounds are normal.  J tube + with minimal erythema.  Musculoskeletal:  Contractures + in all the four extremities.   Neurological:    Skin:  Skin is warm and dry. No rash noted. No pallor.   Peripheral vascular:  pulses in all 4 extremities with no clubbing, no cyanosis, no edema.  Genitourinary: jeffers +    DISCHARGE DISPOSITION   To [facility]    DISCHARGE MEDICATIONS:     Discharge Medications      New Medications      Instructions Start Date   acetaminophen 160 MG/5ML solution  Commonly known as:  TYLENOL   650 mg, Oral, Every 6 Hours PRN      atropine 0.4 MG/ML injection   0.5 mg, Intravenous, As Needed       chlorhexidine 0.12 % solution  Commonly known as:  PERIDEX   15 mL, Mouth/Throat, Every 12 Hours Scheduled      dextrose 40 % gel  Commonly known as:  GLUTOSE   15 g, Oral, Every 15 Minutes PRN      dextrose 50 % solution  Commonly known as:  D50W   25 g, Intravenous, Every 15 Minutes PRN      doxycycline  Commonly known as:  VIBRAMYCIN   100 mg, Intravenous, Every 12 Hours      FENTANYL PCA 1500 MCG/30 ML ([facility])   Nurse Loading Dose: 25 mcg Patient Bolus Dose: 0 mcg Lockout Interval: 10 Minutes Basal Rate: 50 mcg/hr One Hour Dose Limit: 300 mcg      glucagon (human recombinant) 1 MG injection  Commonly known as:  GLUCAGEN DIAGNOSTIC   1 mg, Subcutaneous, As Needed      glycopyrrolate 1 MG tablet  Commonly known as:  ROBINUL   0.5 mg, Oral, Every 8 Hours Scheduled      heparin (porcine) 5000 UNIT/ML injection   5,000 Units, Subcutaneous, Every 8 Hours Scheduled      heparin flush (porcine) 100 UNIT/ML injection   5 mL, Intravenous, As Needed      hydrocortisone sodium succinate 100 MG injection  Commonly known as:  Solu-CORTEF   50 mg, Intravenous, Every 6 Hours      insulin aspart 100 UNIT/ML injection  Commonly known as:  novoLOG   0-9 Units, Subcutaneous, 4 Times Daily Before Meals & Nightly      ipratropium-albuterol 0.5-2.5 mg/3 ml nebulizer  Commonly known as:  DUO-NEB   3 mL, Nebulization, Every 6 Hours - RT      lactobacillus acidophilus capsule capsule   1 capsule, Oral, Daily      lansoprazole 3 MG/ML suspension oral suspension   30 mg, Per G Tube, 2 Times Daily      levothyroxine sodium 100 MCG reconstituted solution injection   50 mcg, Intravenous, Daily at 1100      LORazepam 2 MG/ML injection  Commonly known as:  ATIVAN   2 mg, Intravenous, Every 6 Hours PRN      metroNIDAZOLE 5-0.79 MG/ML-% IVPB  Commonly known as:  FLAGYL   500 mg, Intravenous, Every 8 Hours      nystatin susp + lidocaine viscous oral suspension  Commonly known as:  MAGIC MOUTHWASH   5 mL, Swish & Swallow, 4 Times Daily       propofol 1000 mg/mL emulsion infusion  Commonly known as:  DIPRIVAN   5-50 mcg/kg/min, Intravenous, Titrated      sodium chloride 0.9 % solution 242 mL with norepinephrine 1 MG/ML solution 8,000 mcg   0.02-0.3 mcg/kg/min, Intravenous, Titrated         Continue These Medications      Instructions Start Date   clonazePAM 2 MG tablet  Commonly known as:  KlonoPIN   2 mg, Per J Tube, 3 Times Daily      lacosamide 100 MG tablet tablet  Commonly known as:  VIMPAT   200 mg, Per J Tube, 3 Times Daily      phenytoin 50 MG chewable tablet  Commonly known as:  DILANTIN   125 mg, Per J Tube, 3 Times Daily      potassium chloride 20 MEQ/15ML (10%) solution  Commonly known as:  KAYCIEL   7.5 mEq, Per J Tube, Daily      SABRIL 500 MG pack  Generic drug:  Vigabatrin   1,000 mg, Per J Tube, 3 Times Daily, Give 2 packets in 20mls of water three times a day.         Stop These Medications    baclofen 20 MG tablet  Commonly known as:  LIORESAL     cetirizine 10 MG tablet  Commonly known as:  zyrTEC     cholecalciferol 1000 units tablet  Commonly known as:  VITAMIN D3     EPIDIOLEX 100 MG/ML solution  Generic drug:  Cannabidiol     furosemide 20 MG tablet  Commonly known as:  LASIX     montelukast 5 MG chewable tablet  Commonly known as:  SINGULAIR     pantoprazole 40 MG EC tablet  Commonly known as:  PROTONIX     Taurine 500 MG capsule              No future appointments.    Follow-up Information     William Zaldivar MD .    Specialty:  Family Medicine  Contact information:  [address] [place], [place] [id number] [phone]                    TEST  RESULTS PENDING AT DISCHARGE  None     CODE STATUS  Code Status and Medical Interventions:   Ordered at: 02/28/19 1200     Limited Support to NOT Include:    Cardioversion/Defibrillation     Level Of Support Discussed With:    Health Care Surrogate     Code Status:    No CPR     Medical Interventions (Level of Support Prior to Arrest):    Limited       Mariah Barnhart MD  03/07/19  12:33  PM    Please note that this discharge summary required more than 30 minutes to complete.    Please send a copy of this dictation to the following providers:  William Zaldivar MD

## 2019-03-07 NOTE — NURSING NOTE
Barb from [facility] transport called at this time and stated she did not assign a bed. Melly, receiving nurse called for report one hour ago. [facility] called and updated although already en route. I called mercedes who gave me the bed number assigned to the patient per the charge nurse. Stated she would call back after making sure of bed assignment with charge nurse if there were any issues. Also stated she did not anticipate there would be anymore issues. Will call mercedes upon [facility]'s arrival.

## 2019-03-07 NOTE — PROGRESS NOTES
LOS: 8 days     Patient Care Team:  William Zaldivar MD as PCP - General ([facility])      .Chief Complaint:  Pulmonology is following for septic shock related to community acquire pneumonia, influenza A positive, acute hypoxic respiratory failure requiring mechanical ventilation.       Subjective     Interval History:     Intubated and sedated. On ACVC mode of 15, 33, 65, 9. Blood pressure noted at 94/53 (MAP of 67). Chest xray showed appearance today of decreasing lung volumes. Having episodes of bradycardia. No further seizure activity reported. Family members were present at bedside. No fever reported overnight. Input/Output was net +633 mL over the past 24 hours, with 550 mL output.       History taken from: chart family RN    Review of Systems:     Unable to obtain review of systems due to intubation and sedation.                     Objective     Vital Signs  Temp:  [97.6 °F (36.4 °C)-98.9 °F (37.2 °C)] 98.9 °F (37.2 °C)  Heart Rate:  [] 89  Resp:  [15-23] 18  BP: ()/(40-97) 114/73  FiO2 (%):  [65 %-70 %] 70 %  Body mass index is 30.52 kg/m².    Intake/Output Summary (Last 24 hours) at 3/6/2019 2213  Last data filed at 3/6/2019 2158  Gross per 24 hour   Intake 1673.01 ml   Output 800 ml   Net 873.01 ml     I/O this shift:  In: 200 [IV Piggyback:200]  Out: -     Physical Exam:  GENERAL APPEARANCE: Intubated and sedated.  Appears to be resting comfortably in bed.     HEAD: normocephalic. Atraumatic.     EYES: PERRLA. No scleral icterus.    THROAT: ET tube in place. Oral cavity and pharynx normal. No inflammation, swelling, exudate, or lesions.     NECK: Neck supple. No thyromegaly     CARDIAC: Normal S1 and S2. No S3, S4 or murmurs. Rhythm is regular. There is no peripheral edema, cyanosis or pallor. Extremities are warm and well perfused. Capillary refill is less than 2 seconds. No carotid bruits.    RESPIRATORY: Bilateral air entry positive. Diminished breath sounds with bibasilar  crackles noted at the lung bases. Diffuse rhonchi noted of the lungs bilaterally.   No wheezing upon auscultation.     GI: Positive bowel sounds. Soft, nondistended, nontender.     MUSCULOSKELETAL: No significant deformity or joint abnormality. No edema. Peripheral pulses intact.    NEUROLOGICAL: Unable to assess due to sedation status.     PSYCHIATRIC: Unable to assess due to sedation status.                             Results Review:                I reviewed the patient's new clinical results.  I reviewed the patient's new imaging results and agree with the interpretation.  Results from last 7 days   Lab Units 03/06/19 0232 03/05/19 0214 03/04/19 0251   WBC 10*3/mm3 13.72* 17.18* 22.76*   HEMOGLOBIN g/dL 10.1* 11.1* 11.4*   PLATELETS 10*3/mm3 360 329 265     Results from last 7 days   Lab Units 03/06/19 0232 03/05/19 0214 03/04/19  0251  02/28/19  0738   SODIUM mmol/L 144 148 148   < >  --    POTASSIUM mmol/L 3.7 4.6 4.6   < > 2.6*   CHLORIDE mmol/L 107 109 111   < >  --    CO2 mmol/L 28.3 27.3 29.1   < >  --    BUN mg/dL 12 13 12   < >  --    CREATININE mg/dL 0.19* 0.21* 0.16*   < >  --    CALCIUM mg/dL 8.4 8.6 8.2   < >  --    GLUCOSE mg/dL 158* 133* 145*   < >  --    MAGNESIUM mg/dL  --   --   --   --  1.6    < > = values in this interval not displayed.     No results found for: INR, PROTIME  Results from last 7 days   Lab Units 03/06/19  0232   ALK PHOS U/L 107*   BILIRUBIN mg/dL 0.3   ALT (SGPT) U/L 20   AST (SGOT) U/L 43*     Results from last 7 days   Lab Units 03/06/19  1148   PH, ARTERIAL pH units 7.463*   PO2 ART mm Hg 58.8*   PCO2, ARTERIAL mm Hg 42.7   HCO3 ART mmol/L 29.9*     Imaging Results (last 24 hours)     Procedure Component Value Units Date/Time    XR Chest 1 View [[id number]] Collected:  03/06/19 0929     Updated:  03/06/19 0932    Narrative:       EXAMINATION: XR CHEST 1 VW-      CLINICAL INDICATION:     intubated; J96.01-Acute respiratory failure  with hypoxia; I95.9-Hypotension,  unspecified; A41.9-Sepsis, unspecified  organism; J18.9-Pneumonia, unspecified organism; N30.00-Acute cystitis  without hematuria     TECHNIQUE:  XR CHEST 1 VW-      COMPARISON: 3/5/2019      FINDINGS:   ET tube with tip above level of clavicles. Left Port-A-Cath stable in  appearance and positioning.     Improvement in bilateral airspace disease. Improved lung volumes. No  effusion or pneumothorax. Heart size within normal limits.       Impression:       1. Improvement in bilateral airspace disease with improved lung volumes.  2. Support devices as above.     This report was finalized on 3/6/2019 9:30 AM by Dr. Matthew Juárez MD.                Medication Review:   Scheduled Medications:    atropine sulfate      castor oil-balsam peru  Topical Q12H   chlorhexidine 15 mL Mouth/Throat Q12H   clonazePAM 2 mg Per J Tube TID   doxycycline 100 mg Intravenous Q12H   glycopyrrolate 0.5 mg Oral Q8H   heparin (porcine) 5,000 Units Subcutaneous Q8H   hydrocortisone sodium succinate 50 mg Intravenous Q6H   insulin aspart 0-9 Units Subcutaneous 4x Daily AC & at Bedtime   ipratropium-albuterol 3 mL Nebulization Q6H - RT   lacosamide 200 mg Per J Tube TID   lactobacillus acidophilus 1 capsule Oral Daily   lansoprazole 30 mg Per G Tube BID   levothyroxine sodium 50 mcg Intravenous Daily   metroNIDAZOLE 500 mg Intravenous Q8H   nystatin susp + lidocaine viscous 5 mL Swish & Swallow 4x Daily   phenytoin 100 mg Oral Q8H   sodium chloride 10 mL Intravenous Q12H   sodium chloride 3 mL Intravenous Q12H   Vigabatrin 1,000 mg Per PEG Tube TID     Continuous infusions:    fentaNYL (SUBLIMAZE) PCA 1500 mcg/30 mL syringe     norepinephrine 0.02-0.3 mcg/kg/min Last Rate: 0.04 mcg/kg/min (03/06/19 1848)   propofol 5-50 mcg/kg/min Last Rate: 20 mcg/kg/min (03/06/19 2028)       Assessment/Plan     Patient Active Problem List   Diagnosis Code   • Acute respiratory failure with hypoxia (CMS/[facility]) J96.01   • Septic shock (CMS/[facility]) A41.9, R65.21   •  Bilateral pneumonia J18.9   • Bacteremia R78.81       Assessment:  - Acute hypoxic and hypercarbic respiratory failure, requiring mechanical ventilation  - Mechanical ventilator dependence  - Septic shock  - Anoxic/hypoxic, metabolic encephalopathy versus status epilepticus  - Right upper lobe pneumonia  - Influenza type A positive        Central nervous system:  Intubated and sedated.   Plan   - Pain management: fentanyl drip.   - Sedation: Propofol , RASS goal: 0 to -1  - Patient does not have biot's type of breathing pattern while on opioids.   -  I am assessing the patient daily for possible spontaneous awakening trial.  - I highly recommend avoiding nighttime disturbances and light exposure during night to maintain normal circadian rhythms to avoid hypoactive or hyperactive delirium  - Continue clonazepam, dilantin      Cardiac/aorta  Vasopressor: Norepinephrine   Plan:  - Goal mean arterial pressure is greater than 65 mmHg  - Changed solucortef to every 6 hours.  - Ordered PRN .4 mg push of atropine for recurrent bradycardia      Respiratory:  ACVC mode of 15, 33, 65%, 9.   ABG 7.512, 38.2, 65.3, 30.4.  This was on settings of rate 18 PEEP of 8.  Chest x-ray: reviewed.   ET tube location: Within 5 cm from the celeste.  Plan   - I have reviewed the ventilator graphics.  Patient is synchronous with mechanical ventilator.  There is no auto PEEP or leak appreciated. Auto PEEP of 12 noted.   - I'm keeping tidal volume equal to 6 ML per KG ideal body weight.  - Mechanical ventilators were changed to keep Plateau pressure less than 30 cm H20  - I will titrate FiO2 and PEEP as per ARDS NET protocol.   - Goal SPO2 greater than 90%.  - I have reviewed the ABG.  Vent settings were changed as per ABG results  - Changed the PEEP to 11 with ABG following changes.   - Ordered Lasix single dose  - Continue Doxycycline, DuoNEb, Flagyl      Gastrointestinal/Liver  AST elevated at 43.  ALT within normal limits.  Route of feeding:  PEG  Plan:  - Continue with PPI for ulcer prophylaxis.  - Continue with lactobacillus.  - Nutrition team on board.  Appreciate nutrition team recommendations      Genitorenal:  Creatinine decreased to 0.19.  Input/Output was net +633 mL over the past 24 hours, with 550 mL output.   Acetone level small.   Plan   - I will avoid nephrotoxic agents  - Pharmacy is on board for dosages of medication based on creatinine clearance.  Appreciate pharmacy recommendations.  - I will replete serum electrolytes as per ICU electrolyte replacement protocol.      Endocrine   Glucose remains below 180.  Plan   - Goal serum glucose level is 180 mg/dL while in ICU.  I strongly recommend starting insulin drip if 2 consecutive serum glucose levels are greater than 200 mg/dL.   - Continue levothyroxine       Infectious disease   Influenza A positive.  Blood culture positive for staphylococcus, not aureus  Plan   - Current antibiotics Doxycyline, Flagyl      Heme/Onc   Hemoglobin trended downward at 10.1.  Platelets stable.   Plan   - I recommend PRBC transfusion if hemoglobin is less than 7 g/dL unless active bleeding or cardiac ischemia.  - Continue with subcutaneous heparin for l DVT prophylaxis.  - Hold chemical anticoagulation if platelet count is less than 50,000.      Musculoskeletal   Wound left nostril (< 1 day).   Plan   - Turn the patient every 2 hours to prevent pressure ulcers.      Activity   - I recommend aggressive PTOT while in hospital to avoid critical care neuropathy/myopathy.    Lines:   - Single lumen implantable port left subclavian  - Peripheral iV (7 days)  - PEG tube  - Urethral catheter (8 days)  - ETT (8 days)      Quality measure:  1. Elevation of the head of the bed to 30-45 degrees- yes  2. Deep venous thrombosis prophylaxis- yes (chemical)  3. Indwelling urinary catheter - required for accurate urine output.      Code status: No CPR, cardioversion/defibrillation.       Medical power of : Health care  surrogate listed as Damaris Mercado (mother).       Karis Ortiz PA-C  03/06/19  10:13 PM      Scribed for Dr. Skaggs by Karis Ortiz PA-C    The clinical plan was discussed extensively with the nurse, and respiratory therapist.     Critical Care time spent in direct patient care: 35 minutes (excluding procedure time) including high complexity decision making to assess, and support vital organ system failure in this individual who has impairment of one or more vital organ systems such that there is a high probability of imminent or life threatening deterioration in the patient’s condition.     Patient is critically ill and is at higher risk for further mortality/morbidity. Continue ICU care.        I, Billy Skaggs M.D. attest that the above note accurately reflects the work and decisions made by me. Patient was seen and evaluated by me, including history of present illness, physical exam, assessment, and treatment plan.  The above note was reviewed and edited by me.        Billy Skaggs M.D  Pulmonary and Critical Care Medicine

## 2019-03-07 NOTE — NURSING NOTE
Report given to REFUGIO Pickard at [facility]. [facility] contacted. [facility] of [facility] accepted to take patient and caregiver. ETA 15 minutes.

## 2019-03-07 NOTE — PROGRESS NOTES
Discharge Planning Assessment   Renato     Patient Name: Alyson Mercado  MRN: [id number]  Today's Date: 3/7/2019    Admit Date: 2/26/2019    Discharge Needs Assessment    No documentation.       Discharge Plan     Row Name 03/07/19 0714       Plan    Final Discharge Disposition Code  02 - [facility] for  care    Final Note  Patient was transferred to [facility] on 3-7-19.        Destination      No service coordination in this encounter.      Durable Medical Equipment      No service coordination in this encounter.      Dialysis/Infusion      No service coordination in this encounter.      Home Medical Care      No service coordination in this encounter.      Community Resources      No service coordination in this encounter.        Expected Discharge Date and Time     Expected Discharge Date Expected Discharge Time    Mar 6, 2019         Demographic Summary    No documentation.       Functional Status    No documentation.       Psychosocial    No documentation.       Abuse/Neglect    No documentation.       Legal    No documentation.       Substance Abuse    No documentation.       Patient Forms    No documentation.           Franci Recio RN

## 2019-03-07 NOTE — PROGRESS NOTES
[facility]     Patient Identification:  Name:  Alyson Mercado  Age:  19 y.o.  Sex:  female  :  2000  MRN:  [id number]  Visit Number:  [id number]  ROOM: [address]     Primary Care Provider:  William Zaldivar MD    Length of stay:  8       Subjective        Chief Compliant Follow up for pneumonia, shock, seizure    Patient seen and examined this morning with grandmother at the bedside.  Sedated. Off of Levophed in the morning but requiring since evening. No further seizure activity. Diarrhea resolved. Afebrile overnight.  FiO2 stable at 70%.  Tolerating tube feeding. On propofol and fentanyl gtt.Received a dose of lasix.  Noted to have bradycardia at this morning with heart rate in 40s so received a dose of atropine and bradycardia resolved.    Had a discussion with grandmother at the bedside with Dr. Skaggs also present regarding further goals of care.  Grandmother wants patient to be no CPR and she does not want tracheostomy.  Discussed regarding if the patient's clinical condition deteriorates would she prefer to transfer to tertiary care center and patient grandmother agreeable to transfer the patient to [facility].      Objective     Current Hospital Meds:    atropine sulfate      castor oil-balsam peru  Topical Q12H   chlorhexidine 15 mL Mouth/Throat Q12H   clonazePAM 2 mg Per J Tube TID   doxycycline 100 mg Intravenous Q12H   glycopyrrolate 0.5 mg Oral Q8H   heparin (porcine) 5,000 Units Subcutaneous Q8H   hydrocortisone sodium succinate 50 mg Intravenous Q6H   insulin aspart 0-9 Units Subcutaneous 4x Daily AC & at Bedtime   ipratropium-albuterol 3 mL Nebulization Q6H - RT   lacosamide 200 mg Per J Tube TID   lactobacillus acidophilus 1 capsule Oral Daily   lansoprazole 30 mg Per G Tube BID   levothyroxine sodium 50 mcg Intravenous Daily   metroNIDAZOLE 500 mg Intravenous Q8H   nystatin susp + lidocaine viscous 5 mL Swish & Swallow 4x  Daily   phenytoin 100 mg Oral Q8H   sodium chloride 10 mL Intravenous Q12H   sodium chloride 3 mL Intravenous Q12H   Vigabatrin 1,000 mg Per PEG Tube TID       fentaNYL (SUBLIMAZE) PCA 1500 mcg/30 mL syringe     norepinephrine 0.02-0.3 mcg/kg/min Last Rate: 0.04 mcg/kg/min (03/06/19 1848)   propofol 5-50 mcg/kg/min Last Rate: 30 mcg/kg/min (03/06/19 0844)     ----------------------------------------------------------------------------------------------------------------------  Vital Signs:  Temp:  [97.6 °F (36.4 °C)-98.5 °F (36.9 °C)] 97.6 °F (36.4 °C)  Heart Rate:  [] 89  Resp:  [15-23] 18  BP: ()/(40-97) 103/65  FiO2 (%):  [65 %-70 %] 70 %  SpO2:  [92 %-99 %] 97 %  on   ;   Device (Oxygen Therapy): ventilator  Body mass index is 30.52 kg/m².    Wt Readings from Last 3 Encounters:   03/05/19 45.4 kg (100 lb)       Intake/Output Summary (Last 24 hours) at 3/6/2019 2005  Last data filed at 3/6/2019 1936  Gross per 24 hour   Intake 1573.01 ml   Output 800 ml   Net 773.01 ml     NPO Diet  ----------------------------------------------------------------------------------------------------------------------  Physical exam:  General: Comfortable, Not in distress.    HENT:  Head:  atraumatic.  Mouth:  Moist mucous membranes.    Eyes:  Conjunctivae and EOM are normal.  Pupils are equal, round, and reactive to light left eye. right eye closed. No scleral icterus.    Neck:  Neck supple.  No JVD present.  trachea midline.  Cardiovascular: tachycardia, regular rhythm with no murmur.  Pulmonary/Chest:  No respiratory distress, no wheezes b/l, no crackles, with coarse breath sounds and good air movement.  Abdomen:  Soft.  Mild distension. Bowel sounds are normal.  J tube + with minimal erythema.  Musculoskeletal:  Contractures + in all the four extremities.   Neurological:    Skin:  Skin is warm and dry. No rash noted. No pallor.   Peripheral vascular:  pulses in all 4 extremities with no clubbing, no cyanosis, no  edema.  Genitourinary: jeffers +  ----------------------------------------------------------------------------------------------------------------------  ----------------------------------------------------------------------------------------------------------------------  Results from last 7 days   Lab Units 03/06/19 0232 03/05/19  1044 03/05/19 0214 03/04/19  1124 03/04/19 0251   CRP mg/dL 1.15*  --  2.35*  --   --    LACTATE mmol/L  --  1.2  --  0.7  --    WBC 10*3/mm3 13.72*  --  17.18*  --  22.76*   HEMOGLOBIN g/dL 10.1*  --  11.1*  --  11.4*   HEMATOCRIT % 33.4*  --  34.8*  --  35.9*   MCV fL 105.4*  --  105.1*  --  103.2*   MCHC g/dL 30.2*  --  31.9*  --  31.8*   PLATELETS 10*3/mm3 360  --  329  --  265     Results from last 7 days   Lab Units 03/06/19 0232 03/05/19 0214 03/04/19 0251 02/28/19  0738   SODIUM mmol/L 144 148 148   < >  --    POTASSIUM mmol/L 3.7 4.6 4.6   < > 2.6*   MAGNESIUM mg/dL  --   --   --   --  1.6   CHLORIDE mmol/L 107 109 111   < >  --    CO2 mmol/L 28.3 27.3 29.1   < >  --    BUN mg/dL 12 13 12   < >  --    CREATININE mg/dL 0.19* 0.21* 0.16*   < >  --    EGFR IF NONAFRICN AM mL/min/1.73 >150 >150 >150   < >  --    CALCIUM mg/dL 8.4 8.6 8.2   < >  --    GLUCOSE mg/dL 158* 133* 145*   < >  --    ALBUMIN g/dL 3.00*  --   --   --   --    BILIRUBIN mg/dL 0.3  --   --   --   --    ALK PHOS U/L 107*  --   --   --   --    AST (SGOT) U/L 43*  --   --   --   --    ALT (SGPT) U/L 20  --   --   --   --     < > = values in this interval not displayed.   Estimated Creatinine Clearance: 279.6 mL/min (A) (by C-G formula based on SCr of 0.19 mg/dL (L)).      Glucose   Date/Time Value Ref Range Status   03/06/2019 1735 123 70 - 130 mg/dL Final   03/06/2019 1156 103 70 - 130 mg/dL Final   03/06/2019 0509 142 (H) 70 - 130 mg/dL Final   03/05/2019 2051 147 (H) 70 - 130 mg/dL Final   03/05/2019 1727 117 70 - 130 mg/dL Final   03/05/2019 1157 129 70 - 130 mg/dL Final   03/05/2019 0535 140 (H) 70 - 130  mg/dL Final   03/04/2019 1952 169 (H) 70 - 130 mg/dL Final     No results found for: AMMONIA        Blood Culture   Date Value Ref Range Status   02/28/2019 No growth at 3 days  Preliminary   02/26/2019 Staphylococcus, coagulase negative (A)  Final     Comment:     Probable Contaminant.     02/26/2019 No growth at 5 days  Final      Urine Culture   Date Value Ref Range Status   02/26/2019 No growth  Final        I have personally looked at the labs and they are summarized above.  ----------------------------------------------------------------------------------------------------------------------  Imaging Results (last 24 hours)     Procedure Component Value Units Date/Time    XR Chest 1 View [[id number]] Collected:  03/06/19 0929     Updated:  03/06/19 0932    Narrative:       EXAMINATION: XR CHEST 1 VW-      CLINICAL INDICATION:     intubated; J96.01-Acute respiratory failure  with hypoxia; I95.9-Hypotension, unspecified; A41.9-Sepsis, unspecified  organism; J18.9-Pneumonia, unspecified organism; N30.00-Acute cystitis  without hematuria     TECHNIQUE:  XR CHEST 1 VW-      COMPARISON: 3/5/2019      FINDINGS:   ET tube with tip above level of clavicles. Left Port-A-Cath stable in  appearance and positioning.     Improvement in bilateral airspace disease. Improved lung volumes. No  effusion or pneumothorax. Heart size within normal limits.       Impression:       1. Improvement in bilateral airspace disease with improved lung volumes.  2. Support devices as above.     This report was finalized on 3/6/2019 9:30 AM by Dr. Matthew Juárez MD.           I have personally reviewed the radiology images and read the final radiology report.    Assessment & Plan      Assessment:  Septic Shock  B/L Pneumonia  Influenza A  Acute hypoxic respiratory failure  Diarrhea  Dilantin toxicity  Intractable complex partial seizure  Acute metabolic encephalopathy from sepsis  Cerebral Palsy    Plan:  Septic shock secondary to bilateral  pneumonia.  on Levophed since evening, wean off for SBP>90 and MAP>65.  on hydrocortisone. Continue to monitor blood pressure closely.  Leukocytosis and CRP improving.     For bilateral pneumonia, currently on doxycycline and flagyl. Meropenem dced by ID. respiratory culture normal curry.  ID on board.      Finished course of Tamiflu for influenza A.  For acute hypoxic respiratory failure, pulmonology critical care on board.  Continue to wean FiO2.  Continue with duo nebs. Lasix given again today for the volume overload.Fio2 and PEEP requirement increased and Xray today with improved congestion.  Diarrhea resolved.  Likely secondary to tube feeding.  C. difficile negative.  Dilantin toxicity, levels elevated at the time of admission. Phenytoin level low. Resume phenytoin at the low dose.  For intractable seizure, continue with Vigabatrin, Vimpat and clonazepam.  Will do as needed Ativan.  Neurology consulted.  Lansoprazole for GI prophylaxis  Heparin for DVT prophylaxis.    03/06: Had a discussion with grandmother at the bedside with Dr. Skaggs also present regarding further goals of care.  Grandmother wants patient to be no CPR and she does not want tracheostomy.  Discussed regarding if the patient's clinical condition deteriorates would she prefer to transfer to tertiary care center and patient grandmother agreeable to transfer the patient to [facility].    Per Dr. Skaggs, patient FiO2 requirement is trending up and PEEP has also increased to 10 from 8.  Would consider transferring to [facility].  I am awaiting callback from MICU Senior resident for acceptance.      The patient is high risk due to the following diagnoses/reasons:  Septic Shock, B/L Pneumonia  Influenza A, Acute hypoxic respiratory failure, Dilantin toxicity, Intractable complex partial seizure    Mariah Barnhart MD  03/06/19  8:05 PM    Addendum:[id number]  Patient has been accepted to the [facility]  [facility] under Dr. Abiel Bonilla and they have bed avaialble. Informed the ICU charge nurse to inform the grandmother Damaris and to arrange for the transport.

## 2019-03-07 NOTE — NURSING NOTE
[facility] on unit for patient. Bed confirmed with Melly the receiving RN and Chriss the receiving respiratory therapist at [facility]. Caregiver at bedside with patient. Patient belongings gathered and given to family. Will continue to monitor until patient is off unit.

## 2019-06-24 ENCOUNTER — LAB REQUISITION (OUTPATIENT)
Dept: LAB | Facility: HOSPITAL | Age: 19
End: 2019-06-24

## 2019-06-24 DIAGNOSIS — E87.6 HYPOKALEMIA: ICD-10-CM

## 2019-06-24 LAB — POTASSIUM BLD-SCNC: 3.4 MMOL/L (ref 3.5–5.2)

## 2019-06-24 PROCEDURE — 84132 ASSAY OF SERUM POTASSIUM: CPT | Performed by: FAMILY MEDICINE

## 2020-06-12 ENCOUNTER — LAB REQUISITION (OUTPATIENT)
Dept: LAB | Facility: HOSPITAL | Age: 20
End: 2020-06-12

## 2020-06-12 DIAGNOSIS — G40.119 LOCALIZATION-RELATED (FOCAL) (PARTIAL) SYMPTOMATIC EPILEPSY AND EPILEPTIC SYNDROMES WITH SIMPLE PARTIAL SEIZURES, INTRACTABLE, WITHOUT STATUS EPILEPTICUS (HCC): ICD-10-CM

## 2020-06-12 LAB
ALBUMIN SERPL-MCNC: 3.8 G/DL (ref 3.5–5.2)
ALBUMIN SERPL-MCNC: 3.85 G/DL (ref 3.5–5.2)
ALBUMIN SERPL-MCNC: 3.85 G/DL (ref 3.5–5.2)
ALBUMIN/GLOB SERPL: 1.2 G/DL
ALP SERPL-CCNC: 157 U/L (ref 39–117)
ALP SERPL-CCNC: 157 U/L (ref 39–117)
ALT SERPL W P-5'-P-CCNC: <5 U/L (ref 1–33)
ALT SERPL W P-5'-P-CCNC: <5 U/L (ref 1–33)
ANION GAP SERPL CALCULATED.3IONS-SCNC: 13.2 MMOL/L (ref 5–15)
ANION GAP SERPL CALCULATED.3IONS-SCNC: 14.3 MMOL/L (ref 5–15)
AST SERPL-CCNC: 22 U/L (ref 1–32)
AST SERPL-CCNC: 22 U/L (ref 1–32)
BASOPHILS # BLD AUTO: 0.04 10*3/MM3 (ref 0–0.2)
BASOPHILS NFR BLD AUTO: 0.7 % (ref 0–1.5)
BILIRUB CONJ SERPL-MCNC: <0.2 MG/DL (ref 0.2–0.3)
BILIRUB INDIRECT SERPL-MCNC: ABNORMAL MG/DL
BILIRUB SERPL-MCNC: <0.2 MG/DL (ref 0.2–1.2)
BILIRUB SERPL-MCNC: <0.2 MG/DL (ref 0.2–1.2)
BUN BLD-MCNC: 4 MG/DL (ref 6–20)
BUN BLD-MCNC: 4 MG/DL (ref 6–20)
BUN/CREAT SERPL: 23.5 (ref 7–25)
BUN/CREAT SERPL: 23.5 (ref 7–25)
CALCIUM SPEC-SCNC: 9.1 MG/DL (ref 8.6–10.5)
CALCIUM SPEC-SCNC: 9.2 MG/DL (ref 8.6–10.5)
CHLORIDE SERPL-SCNC: 96 MMOL/L (ref 98–107)
CHLORIDE SERPL-SCNC: 96 MMOL/L (ref 98–107)
CHOLEST SERPL-MCNC: 204 MG/DL (ref 0–200)
CO2 SERPL-SCNC: 30.7 MMOL/L (ref 22–29)
CO2 SERPL-SCNC: 30.8 MMOL/L (ref 22–29)
CREAT BLD-MCNC: 0.17 MG/DL (ref 0.57–1)
CREAT BLD-MCNC: 0.17 MG/DL (ref 0.57–1)
DEPRECATED RDW RBC AUTO: 45.6 FL (ref 37–54)
EOSINOPHIL # BLD AUTO: 0.1 10*3/MM3 (ref 0–0.4)
EOSINOPHIL NFR BLD AUTO: 1.9 % (ref 0.3–6.2)
ERYTHROCYTE [DISTWIDTH] IN BLOOD BY AUTOMATED COUNT: 14 % (ref 12.3–15.4)
GFR SERPL CREATININE-BSD FRML MDRD: >150 ML/MIN/1.73
GFR SERPL CREATININE-BSD FRML MDRD: >150 ML/MIN/1.73
GLOBULIN UR ELPH-MCNC: 3.2 GM/DL
GLUCOSE BLD-MCNC: 77 MG/DL (ref 65–99)
GLUCOSE BLD-MCNC: 78 MG/DL (ref 65–99)
HCT VFR BLD AUTO: 35.8 % (ref 34–46.6)
HDLC SERPL-MCNC: 27 MG/DL (ref 40–60)
HGB BLD-MCNC: 10.2 G/DL (ref 12–15.9)
HYPOCHROMIA BLD QL: NORMAL
IMM GRANULOCYTES # BLD AUTO: 0.02 10*3/MM3 (ref 0–0.05)
IMM GRANULOCYTES NFR BLD AUTO: 0.4 % (ref 0–0.5)
LDLC SERPL CALC-MCNC: 127 MG/DL (ref 0–100)
LDLC/HDLC SERPL: 4.72 {RATIO}
LYMPHOCYTES # BLD AUTO: 1.91 10*3/MM3 (ref 0.7–3.1)
LYMPHOCYTES NFR BLD AUTO: 35.6 % (ref 19.6–45.3)
MCH RBC QN AUTO: 25.4 PG (ref 26.6–33)
MCHC RBC AUTO-ENTMCNC: 28.5 G/DL (ref 31.5–35.7)
MCV RBC AUTO: 89.3 FL (ref 79–97)
MONOCYTES # BLD AUTO: 0.91 10*3/MM3 (ref 0.1–0.9)
MONOCYTES NFR BLD AUTO: 16.9 % (ref 5–12)
NEUTROPHILS # BLD AUTO: 2.39 10*3/MM3 (ref 1.7–7)
NEUTROPHILS NFR BLD AUTO: 44.5 % (ref 42.7–76)
NRBC BLD AUTO-RTO: 0 /100 WBC (ref 0–0.2)
PHENYTOIN SERPL-MCNC: 35.3 MCG/ML (ref 10–20)
PHOSPHATE SERPL-MCNC: 4.3 MG/DL (ref 2.5–4.5)
PLAT MORPH BLD: NORMAL
PLATELET # BLD AUTO: 381 10*3/MM3 (ref 140–450)
PMV BLD AUTO: 10.5 FL (ref 6–12)
POTASSIUM BLD-SCNC: 3.3 MMOL/L (ref 3.5–5.2)
POTASSIUM BLD-SCNC: 3.4 MMOL/L (ref 3.5–5.2)
PROT SERPL-MCNC: 7 G/DL (ref 6–8.5)
PROT SERPL-MCNC: 7 G/DL (ref 6–8.5)
RBC # BLD AUTO: 4.01 10*6/MM3 (ref 3.77–5.28)
SODIUM BLD-SCNC: 140 MMOL/L (ref 136–145)
SODIUM BLD-SCNC: 141 MMOL/L (ref 136–145)
TRIGL SERPL-MCNC: 248 MG/DL (ref 0–150)
TSH SERPL DL<=0.05 MIU/L-ACNC: 0.99 UIU/ML (ref 0.27–4.2)
VLDLC SERPL-MCNC: 49.6 MG/DL
WBC NRBC COR # BLD: 5.37 10*3/MM3 (ref 3.4–10.8)

## 2020-06-12 PROCEDURE — 85025 COMPLETE CBC W/AUTO DIFF WBC: CPT | Performed by: PSYCHIATRY & NEUROLOGY

## 2020-06-12 PROCEDURE — 84443 ASSAY THYROID STIM HORMONE: CPT | Performed by: PSYCHIATRY & NEUROLOGY

## 2020-06-12 PROCEDURE — 80053 COMPREHEN METABOLIC PANEL: CPT | Performed by: PSYCHIATRY & NEUROLOGY

## 2020-06-12 PROCEDURE — 80061 LIPID PANEL: CPT | Performed by: PSYCHIATRY & NEUROLOGY

## 2020-06-12 PROCEDURE — 82306 VITAMIN D 25 HYDROXY: CPT | Performed by: PSYCHIATRY & NEUROLOGY

## 2020-06-12 PROCEDURE — 85007 BL SMEAR W/DIFF WBC COUNT: CPT | Performed by: PSYCHIATRY & NEUROLOGY

## 2020-06-12 PROCEDURE — 80185 ASSAY OF PHENYTOIN TOTAL: CPT | Performed by: PSYCHIATRY & NEUROLOGY

## 2020-06-13 LAB — 25(OH)D3 SERPL-MCNC: 56.8 NG/ML (ref 30–100)

## 2020-07-06 ENCOUNTER — LAB REQUISITION (OUTPATIENT)
Dept: LAB | Facility: HOSPITAL | Age: 20
End: 2020-07-06

## 2020-07-06 DIAGNOSIS — R56.9 UNSPECIFIED CONVULSIONS (HCC): ICD-10-CM

## 2020-07-06 DIAGNOSIS — G80.9 CEREBRAL PALSY, UNSPECIFIED (HCC): ICD-10-CM

## 2020-07-06 PROCEDURE — 84630 ASSAY OF ZINC: CPT | Performed by: THORACIC SURGERY (CARDIOTHORACIC VASCULAR SURGERY)

## 2020-07-06 PROCEDURE — 82525 ASSAY OF COPPER: CPT | Performed by: THORACIC SURGERY (CARDIOTHORACIC VASCULAR SURGERY)

## 2020-07-06 PROCEDURE — 82010 KETONE BODYS QUAN: CPT | Performed by: THORACIC SURGERY (CARDIOTHORACIC VASCULAR SURGERY)

## 2020-07-06 PROCEDURE — 84255 ASSAY OF SELENIUM: CPT | Performed by: THORACIC SURGERY (CARDIOTHORACIC VASCULAR SURGERY)

## 2020-07-08 LAB
COPPER SERPL-MCNC: 142 UG/DL (ref 72–166)
SELENIUM SERPL-MCNC: 77 UG/L (ref 91–198)
ZINC SERPL-MCNC: 68 UG/DL (ref 56–134)

## 2020-07-09 LAB
B-OH-BUTYR SERPL-MCNC: 35 MG/DL
CARN ESTERS/C0 SERPL-SRTO: 1.5 RATIO (ref 0–0.9)
CARNITINE FREE SERPL-SCNC: 24 UMOL/L (ref 20–55)
CARNITINE SERPL-SCNC: 60 UMOL/L (ref 27–73)

## 2020-11-03 ENCOUNTER — APPOINTMENT (OUTPATIENT)
Dept: CT IMAGING | Facility: HOSPITAL | Age: 20
End: 2020-11-03

## 2020-11-03 ENCOUNTER — APPOINTMENT (OUTPATIENT)
Dept: GENERAL RADIOLOGY | Facility: HOSPITAL | Age: 20
End: 2020-11-03

## 2020-11-03 ENCOUNTER — HOSPITAL ENCOUNTER (EMERGENCY)
Facility: HOSPITAL | Age: 20
Discharge: ANOTHER HOSPITAL | End: 2020-11-03
Attending: FAMILY MEDICINE | Admitting: FAMILY MEDICINE

## 2020-11-03 VITALS
RESPIRATION RATE: 18 BRPM | WEIGHT: 100 LBS | BODY MASS INDEX: 23.14 KG/M2 | TEMPERATURE: 97.7 F | DIASTOLIC BLOOD PRESSURE: 56 MMHG | OXYGEN SATURATION: 94 % | HEART RATE: 112 BPM | HEIGHT: 55 IN | SYSTOLIC BLOOD PRESSURE: 95 MMHG

## 2020-11-03 DIAGNOSIS — J18.9 PNEUMONIA OF BOTH LUNGS DUE TO INFECTIOUS ORGANISM, UNSPECIFIED PART OF LUNG: ICD-10-CM

## 2020-11-03 DIAGNOSIS — A41.9 SEPSIS, DUE TO UNSPECIFIED ORGANISM, UNSPECIFIED WHETHER ACUTE ORGAN DYSFUNCTION PRESENT (HCC): ICD-10-CM

## 2020-11-03 DIAGNOSIS — I95.9 HYPOTENSION, UNSPECIFIED HYPOTENSION TYPE: ICD-10-CM

## 2020-11-03 DIAGNOSIS — J69.0 ASPIRATION PNEUMONIA OF RIGHT UPPER LOBE, UNSPECIFIED ASPIRATION PNEUMONIA TYPE (HCC): Primary | ICD-10-CM

## 2020-11-03 DIAGNOSIS — K59.00 CONSTIPATION, UNSPECIFIED CONSTIPATION TYPE: ICD-10-CM

## 2020-11-03 DIAGNOSIS — R78.89 ELEVATED DILANTIN LEVEL: ICD-10-CM

## 2020-11-03 LAB
A-A DO2: 114.8 MMHG (ref 0–300)
A-A DO2: 163.1 MMHG (ref 0–300)
ALBUMIN SERPL-MCNC: 3.31 G/DL (ref 3.5–5.2)
ALBUMIN/GLOB SERPL: 1.3 G/DL
ALP SERPL-CCNC: 110 U/L (ref 39–117)
ALT SERPL W P-5'-P-CCNC: 10 U/L (ref 1–33)
ANION GAP SERPL CALCULATED.3IONS-SCNC: 9.4 MMOL/L (ref 5–15)
ARTERIAL PATENCY WRIST A: ABNORMAL
ARTERIAL PATENCY WRIST A: ABNORMAL
AST SERPL-CCNC: 43 U/L (ref 1–32)
ATMOSPHERIC PRESS: 734 MMHG
ATMOSPHERIC PRESS: 734 MMHG
BACTERIA UR QL AUTO: ABNORMAL /HPF
BASE EXCESS BLDA CALC-SCNC: 5.8 MMOL/L (ref 0–2)
BASE EXCESS BLDA CALC-SCNC: 6.6 MMOL/L (ref 0–2)
BASOPHILS # BLD AUTO: 0.02 10*3/MM3 (ref 0–0.2)
BASOPHILS NFR BLD AUTO: 0.3 % (ref 0–1.5)
BDY SITE: ABNORMAL
BDY SITE: ABNORMAL
BILIRUB SERPL-MCNC: 0.2 MG/DL (ref 0–1.2)
BILIRUB UR QL STRIP: NEGATIVE
BODY TEMPERATURE: 0 C
BODY TEMPERATURE: 0 C
BUN SERPL-MCNC: 9 MG/DL (ref 6–20)
BUN/CREAT SERPL: 52.9 (ref 7–25)
CALCIUM SPEC-SCNC: 7.9 MG/DL (ref 8.6–10.5)
CHLORIDE SERPL-SCNC: 100 MMOL/L (ref 98–107)
CLARITY UR: CLEAR
CO2 BLDA-SCNC: 33 MMOL/L (ref 22–33)
CO2 BLDA-SCNC: 34.5 MMOL/L (ref 22–33)
CO2 SERPL-SCNC: 28.6 MMOL/L (ref 22–29)
COHGB MFR BLD: 1.3 % (ref 0–5)
COHGB MFR BLD: 1.4 % (ref 0–5)
COLOR UR: ABNORMAL
CREAT SERPL-MCNC: 0.17 MG/DL (ref 0.57–1)
CRP SERPL-MCNC: 7.8 MG/DL (ref 0–0.5)
D-LACTATE SERPL-SCNC: 0.6 MMOL/L (ref 0.5–2)
DEPRECATED RDW RBC AUTO: 49.9 FL (ref 37–54)
EOSINOPHIL # BLD AUTO: 0.03 10*3/MM3 (ref 0–0.4)
EOSINOPHIL NFR BLD AUTO: 0.4 % (ref 0.3–6.2)
ERYTHROCYTE [DISTWIDTH] IN BLOOD BY AUTOMATED COUNT: 16.4 % (ref 12.3–15.4)
GAS FLOW AIRWAY: 3.5 LPM
GAS FLOW AIRWAY: 5 LPM
GFR SERPL CREATININE-BSD FRML MDRD: >150 ML/MIN/1.73
GLOBULIN UR ELPH-MCNC: 2.6 GM/DL
GLUCOSE SERPL-MCNC: 142 MG/DL (ref 65–99)
GLUCOSE UR STRIP-MCNC: NEGATIVE MG/DL
HCO3 BLDA-SCNC: 31.4 MMOL/L (ref 20–26)
HCO3 BLDA-SCNC: 32.8 MMOL/L (ref 20–26)
HCT VFR BLD AUTO: 31.4 % (ref 34–46.6)
HCT VFR BLD CALC: 28.3 % (ref 38–51)
HCT VFR BLD CALC: 28.8 % (ref 38–51)
HGB BLD-MCNC: 9.1 G/DL (ref 12–15.9)
HGB BLDA-MCNC: 9.2 G/DL (ref 13.5–17.5)
HGB BLDA-MCNC: 9.4 G/DL (ref 13.5–17.5)
HGB UR QL STRIP.AUTO: ABNORMAL
HYALINE CASTS UR QL AUTO: ABNORMAL /LPF
IMM GRANULOCYTES # BLD AUTO: 0.05 10*3/MM3 (ref 0–0.05)
IMM GRANULOCYTES NFR BLD AUTO: 0.7 % (ref 0–0.5)
INHALED O2 CONCENTRATION: 34 %
INHALED O2 CONCENTRATION: 40 %
KETONES UR QL STRIP: ABNORMAL
LEUKOCYTE ESTERASE UR QL STRIP.AUTO: ABNORMAL
LYMPHOCYTES # BLD AUTO: 0.44 10*3/MM3 (ref 0.7–3.1)
LYMPHOCYTES NFR BLD AUTO: 6 % (ref 19.6–45.3)
Lab: ABNORMAL
Lab: ABNORMAL
MAGNESIUM SERPL-MCNC: 1.9 MG/DL (ref 1.6–2.6)
MCH RBC QN AUTO: 24.4 PG (ref 26.6–33)
MCHC RBC AUTO-ENTMCNC: 29 G/DL (ref 31.5–35.7)
MCV RBC AUTO: 84.2 FL (ref 79–97)
METHGB BLD QL: 0.1 % (ref 0–3)
METHGB BLD QL: 0.1 % (ref 0–3)
MODALITY: ABNORMAL
MODALITY: ABNORMAL
MONOCYTES # BLD AUTO: 0.53 10*3/MM3 (ref 0.1–0.9)
MONOCYTES NFR BLD AUTO: 7.2 % (ref 5–12)
NEUTROPHILS NFR BLD AUTO: 6.28 10*3/MM3 (ref 1.7–7)
NEUTROPHILS NFR BLD AUTO: 85.4 % (ref 42.7–76)
NITRITE UR QL STRIP: NEGATIVE
NOTE: ABNORMAL
NOTE: ABNORMAL
NRBC BLD AUTO-RTO: 0 /100 WBC (ref 0–0.2)
NT-PROBNP SERPL-MCNC: 193 PG/ML (ref 0–450)
OXYHGB MFR BLDV: 87.1 % (ref 94–99)
OXYHGB MFR BLDV: 87.5 % (ref 94–99)
PCO2 BLDA: 50.6 MM HG (ref 35–45)
PCO2 BLDA: 55.5 MM HG (ref 35–45)
PCO2 TEMP ADJ BLD: ABNORMAL MM[HG]
PCO2 TEMP ADJ BLD: ABNORMAL MM[HG]
PH BLDA: 7.38 PH UNITS (ref 7.35–7.45)
PH BLDA: 7.4 PH UNITS (ref 7.35–7.45)
PH UR STRIP.AUTO: <=5 [PH] (ref 5–8)
PH, TEMP CORRECTED: ABNORMAL
PH, TEMP CORRECTED: ABNORMAL
PHENYTOIN SERPL-MCNC: >40 MCG/ML (ref 10–20)
PLATELET # BLD AUTO: 307 10*3/MM3 (ref 140–450)
PMV BLD AUTO: 9.5 FL (ref 6–12)
PO2 BLDA: 56.1 MM HG (ref 83–108)
PO2 BLDA: 57.5 MM HG (ref 83–108)
PO2 TEMP ADJ BLD: ABNORMAL MM[HG]
PO2 TEMP ADJ BLD: ABNORMAL MM[HG]
POTASSIUM SERPL-SCNC: 3.2 MMOL/L (ref 3.5–5.2)
PROT SERPL-MCNC: 5.9 G/DL (ref 6–8.5)
PROT UR QL STRIP: NEGATIVE
QT INTERVAL: 328 MS
QTC INTERVAL: 429 MS
RBC # BLD AUTO: 3.73 10*6/MM3 (ref 3.77–5.28)
RBC # UR: ABNORMAL /HPF
REF LAB TEST METHOD: ABNORMAL
SAO2 % BLDCOA: 88.3 % (ref 94–99)
SAO2 % BLDCOA: 88.8 % (ref 94–99)
SARS-COV-2 RDRP RESP QL NAA+PROBE: NOT DETECTED
SODIUM SERPL-SCNC: 138 MMOL/L (ref 136–145)
SP GR UR STRIP: 1.02 (ref 1–1.03)
SQUAMOUS #/AREA URNS HPF: ABNORMAL /HPF
TROPONIN T SERPL-MCNC: 0.01 NG/ML (ref 0–0.03)
TSH SERPL DL<=0.05 MIU/L-ACNC: 1.58 UIU/ML (ref 0.27–4.2)
UROBILINOGEN UR QL STRIP: ABNORMAL
VENTILATOR MODE: ABNORMAL
VENTILATOR MODE: ABNORMAL
WBC # BLD AUTO: 7.35 10*3/MM3 (ref 3.4–10.8)
WBC UR QL AUTO: ABNORMAL /HPF

## 2020-11-03 PROCEDURE — 71045 X-RAY EXAM CHEST 1 VIEW: CPT | Performed by: RADIOLOGY

## 2020-11-03 PROCEDURE — 93005 ELECTROCARDIOGRAM TRACING: CPT | Performed by: FAMILY MEDICINE

## 2020-11-03 PROCEDURE — 87040 BLOOD CULTURE FOR BACTERIA: CPT | Performed by: FAMILY MEDICINE

## 2020-11-03 PROCEDURE — 99285 EMERGENCY DEPT VISIT HI MDM: CPT

## 2020-11-03 PROCEDURE — 74176 CT ABD & PELVIS W/O CONTRAST: CPT | Performed by: RADIOLOGY

## 2020-11-03 PROCEDURE — 82375 ASSAY CARBOXYHB QUANT: CPT

## 2020-11-03 PROCEDURE — 96365 THER/PROPH/DIAG IV INF INIT: CPT

## 2020-11-03 PROCEDURE — 82805 BLOOD GASES W/O2 SATURATION: CPT

## 2020-11-03 PROCEDURE — 71045 X-RAY EXAM CHEST 1 VIEW: CPT

## 2020-11-03 PROCEDURE — 87150 DNA/RNA AMPLIFIED PROBE: CPT | Performed by: FAMILY MEDICINE

## 2020-11-03 PROCEDURE — P9612 CATHETERIZE FOR URINE SPEC: HCPCS

## 2020-11-03 PROCEDURE — 36600 WITHDRAWAL OF ARTERIAL BLOOD: CPT

## 2020-11-03 PROCEDURE — 86140 C-REACTIVE PROTEIN: CPT | Performed by: FAMILY MEDICINE

## 2020-11-03 PROCEDURE — 74176 CT ABD & PELVIS W/O CONTRAST: CPT

## 2020-11-03 PROCEDURE — 96361 HYDRATE IV INFUSION ADD-ON: CPT

## 2020-11-03 PROCEDURE — 83880 ASSAY OF NATRIURETIC PEPTIDE: CPT | Performed by: FAMILY MEDICINE

## 2020-11-03 PROCEDURE — 71250 CT THORAX DX C-: CPT | Performed by: RADIOLOGY

## 2020-11-03 PROCEDURE — 81001 URINALYSIS AUTO W/SCOPE: CPT | Performed by: FAMILY MEDICINE

## 2020-11-03 PROCEDURE — 96367 TX/PROPH/DG ADDL SEQ IV INF: CPT

## 2020-11-03 PROCEDURE — 25010000002 LINEZOLID 600 MG/300ML SOLUTION: Performed by: FAMILY MEDICINE

## 2020-11-03 PROCEDURE — 96368 THER/DIAG CONCURRENT INF: CPT

## 2020-11-03 PROCEDURE — 93010 ELECTROCARDIOGRAM REPORT: CPT | Performed by: INTERNAL MEDICINE

## 2020-11-03 PROCEDURE — 80185 ASSAY OF PHENYTOIN TOTAL: CPT | Performed by: FAMILY MEDICINE

## 2020-11-03 PROCEDURE — 94799 UNLISTED PULMONARY SVC/PX: CPT

## 2020-11-03 PROCEDURE — 96366 THER/PROPH/DIAG IV INF ADDON: CPT

## 2020-11-03 PROCEDURE — 87635 SARS-COV-2 COVID-19 AMP PRB: CPT | Performed by: FAMILY MEDICINE

## 2020-11-03 PROCEDURE — 84484 ASSAY OF TROPONIN QUANT: CPT | Performed by: FAMILY MEDICINE

## 2020-11-03 PROCEDURE — 25010000003 POTASSIUM CHLORIDE 10 MEQ/100ML SOLUTION: Performed by: FAMILY MEDICINE

## 2020-11-03 PROCEDURE — 80053 COMPREHEN METABOLIC PANEL: CPT | Performed by: FAMILY MEDICINE

## 2020-11-03 PROCEDURE — 84443 ASSAY THYROID STIM HORMONE: CPT | Performed by: FAMILY MEDICINE

## 2020-11-03 PROCEDURE — 85025 COMPLETE CBC W/AUTO DIFF WBC: CPT | Performed by: FAMILY MEDICINE

## 2020-11-03 PROCEDURE — 83605 ASSAY OF LACTIC ACID: CPT | Performed by: FAMILY MEDICINE

## 2020-11-03 PROCEDURE — 71250 CT THORAX DX C-: CPT

## 2020-11-03 PROCEDURE — 83735 ASSAY OF MAGNESIUM: CPT | Performed by: FAMILY MEDICINE

## 2020-11-03 PROCEDURE — 83050 HGB METHEMOGLOBIN QUAN: CPT

## 2020-11-03 PROCEDURE — 87147 CULTURE TYPE IMMUNOLOGIC: CPT | Performed by: FAMILY MEDICINE

## 2020-11-03 RX ORDER — SODIUM CHLORIDE 0.9 % (FLUSH) 0.9 %
10 SYRINGE (ML) INJECTION AS NEEDED
Status: DISCONTINUED | OUTPATIENT
Start: 2020-11-03 | End: 2020-11-03 | Stop reason: HOSPADM

## 2020-11-03 RX ORDER — LINEZOLID 2 MG/ML
600 INJECTION, SOLUTION INTRAVENOUS ONCE
Status: COMPLETED | OUTPATIENT
Start: 2020-11-03 | End: 2020-11-03

## 2020-11-03 RX ORDER — DEXAMETHASONE SODIUM PHOSPHATE 4 MG/ML
8 INJECTION, SOLUTION INTRA-ARTICULAR; INTRALESIONAL; INTRAMUSCULAR; INTRAVENOUS; SOFT TISSUE ONCE
Status: DISCONTINUED | OUTPATIENT
Start: 2020-11-03 | End: 2020-11-03 | Stop reason: HOSPADM

## 2020-11-03 RX ORDER — POTASSIUM CHLORIDE 7.45 MG/ML
10 INJECTION INTRAVENOUS ONCE
Status: DISCONTINUED | OUTPATIENT
Start: 2020-11-03 | End: 2020-11-03 | Stop reason: HOSPADM

## 2020-11-03 RX ORDER — POTASSIUM CHLORIDE 7.45 MG/ML
10 INJECTION INTRAVENOUS ONCE
Status: COMPLETED | OUTPATIENT
Start: 2020-11-03 | End: 2020-11-03

## 2020-11-03 RX ORDER — NOREPINEPHRINE BIT/0.9 % NACL 8 MG/250ML
.02-.3 INFUSION BOTTLE (ML) INTRAVENOUS
Status: DISCONTINUED | OUTPATIENT
Start: 2020-11-03 | End: 2020-11-03 | Stop reason: HOSPADM

## 2020-11-03 RX ADMIN — NOREPINEPHRINE BITARTRATE 0.02 MCG/KG/MIN: 1 INJECTION, SOLUTION, CONCENTRATE INTRAVENOUS at 15:54

## 2020-11-03 RX ADMIN — METRONIDAZOLE 500 MG: 500 INJECTION, SOLUTION INTRAVENOUS at 16:45

## 2020-11-03 RX ADMIN — LINEZOLID 600 MG: 600 INJECTION, SOLUTION INTRAVENOUS at 17:55

## 2020-11-03 RX ADMIN — POTASSIUM CHLORIDE 10 MEQ: 7.46 INJECTION, SOLUTION INTRAVENOUS at 17:06

## 2020-11-03 RX ADMIN — SODIUM CHLORIDE 2 G: 900 INJECTION INTRAVENOUS at 14:38

## 2020-11-03 RX ADMIN — SODIUM CHLORIDE 908 ML: 9 INJECTION, SOLUTION INTRAVENOUS at 12:49

## 2020-11-03 RX ADMIN — SODIUM CHLORIDE 1000 ML: 9 INJECTION, SOLUTION INTRAVENOUS at 15:17

## 2020-11-03 NOTE — ED NOTES
Report given to Tyra HUFF- PICU at [facility], Marge Marroquin RN  11/03/20 9448

## 2020-11-03 NOTE — ED NOTES
Called [facility]  For possible pt transfer     Ollie Huddleston  11/03/20 9500

## 2020-11-03 NOTE — ED NOTES
Pt accepted to [facility]  Dr.jame Weston accepted      Ollie Huddleston  11/03/20 9738

## 2020-11-03 NOTE — ED NOTES
Unsuccessful attempt by Sandra ED tech and Mahendra  tech to obtain labs. Contacted outpatient lab at this time, provider aware.      Diane Gonzalez RN  11/03/20 2616

## 2020-11-03 NOTE — ED NOTES
[facility] is here to transport pt to [facility] Ollie Luna  11/03/20 9063

## 2020-11-03 NOTE — ED NOTES
Called phi talked to caterina about possible transfer to [facility]      Ollie Huddleston  11/03/20 6926

## 2020-11-03 NOTE — ED NOTES
PHI arriving at this time. Patient accepted at [facility]. Waiting for bed assignment.     Marge Farah RN  11/03/20 5217

## 2020-11-03 NOTE — ED PROVIDER NOTES
Subjective   Patient is a 20-year-old female who presents to the emergency department for abdominal distention.  The patient is severely debilitated at baseline and bedbound.  She has a history of herpes encephalitis, and seizures.  These diagnoses were made shortly after birth.  The patient's mother is her primary caretaker and she states that for the past 2 days she has noticed that when she tries to do tube feedings the feeding seem to come back out the tube.  She has not noticed any vomiting from the patient.  She states that patient's temperature was 99 on Sunday and got 200 degrees earlier today.  The patient has a chronic cough, but had a slight increase in coughing over the past couple days as well.  There has been no known exposure to COVID-19.  The patient has not had any foul-smelling urine.  Her last bowel movement was on Sunday and was normal.  The patient has not seemed to be in any pain, and no other symptoms are out of the ordinary.  Mother does state that she seems to be a little bit sleepier than usual today.          Review of Systems   Unable to perform ROS: Patient nonverbal   Constitutional: Positive for activity change and fever.   HENT: Negative for congestion and sneezing.    Eyes: Negative for discharge and itching.   Respiratory: Positive for cough. Negative for apnea, choking, shortness of breath and wheezing.    Gastrointestinal: Positive for abdominal distention. Negative for blood in stool, constipation, diarrhea, nausea and vomiting.   Neurological: Negative for seizures.       Past Medical History:   Diagnosis Date   • Herpes encephalitis    • Seizures (CMS/[facility])        Allergies   Allergen Reactions   • Rocephin [Ceftriaxone] Anaphylaxis   • Vancomycin Anaphylaxis   • Dextrose Nausea Only     Caregiver reports dextrose causes patient to have seizures        Past Surgical History:   Procedure Laterality Date   • GTUBE REPLACEMENT     • NISSEN FUNDOPLICATION     • TONSILLECTOMY          History reviewed. No pertinent family history.    Social History     Socioeconomic History   • Marital status: Single     Spouse name: Not on file   • Number of children: Not on file   • Years of education: Not on file   • Highest education level: Not on file   Tobacco Use   • Smoking status: Never Smoker   • Smokeless tobacco: Never Used   Substance and Sexual Activity   • Alcohol use: No   • Sexual activity: Never           Objective   Physical Exam  Vitals signs and nursing note reviewed.   Constitutional:       General: She is not in acute distress.     Appearance: She is ill-appearing.      Comments: Frail, chronically ill-appearing female.   HENT:      Head: Normocephalic.      Mouth/Throat:      Pharynx: No pharyngeal swelling or oropharyngeal exudate.   Cardiovascular:      Rate and Rhythm: Normal rate and regular rhythm.      Heart sounds: Normal heart sounds. No murmur. No friction rub. No gallop.    Pulmonary:      Effort: Pulmonary effort is normal. No respiratory distress.      Breath sounds: No stridor. Examination of the right-upper field reveals rhonchi. Examination of the right-middle field reveals rhonchi. Examination of the right-lower field reveals rhonchi. Rhonchi present. No wheezing or rales.   Abdominal:      General: Bowel sounds are normal. There is distension. There is no abdominal bruit.      Palpations: Abdomen is soft.      Tenderness: There is no abdominal tenderness.      Comments: Mild distention, G tube in place appears clean and there is no exudate.  Patient does not appear to be tender on examintaion   Skin:     General: Skin is warm and dry.      Capillary Refill: Capillary refill takes less than 2 seconds.      Coloration: Skin is not cyanotic, jaundiced, mottled or pale.   Neurological:      Mental Status: She is lethargic.         Procedures           ED Course  ED Course as of Nov 03 1702   Tue Nov 03, 2020   1351 EKG sinus tachycardia with a rate of 103 bpm VT interval  120 ms QTc 429    [EG]   1641 Waiting to hear back from [facility].    [EG]   1702 Case discussed with the ER physician Dr. Weston at [facility] who is accepted this patient for transfer.    [EG]      ED Course User Index  [EG] Mary Kay Abbasi,                                            MDM  Number of Diagnoses or Management Options  Aspiration pneumonia of right upper lobe, unspecified aspiration pneumonia type (CMS/HCC): new and requires workup  Constipation, unspecified constipation type: new and requires workup  Elevated Dilantin level: new and requires workup  Hypotension, unspecified hypotension type: new and requires workup  Pneumonia of both lungs due to infectious organism, unspecified part of lung: new and requires workup  Sepsis, due to unspecified organism, unspecified whether acute organ dysfunction present (CMS/HCC): new and requires workup     Amount and/or Complexity of Data Reviewed  Clinical lab tests: ordered and reviewed  Tests in the radiology section of CPT®: ordered and reviewed  Tests in the medicine section of CPT®: ordered and reviewed  Decide to obtain previous medical records or to obtain history from someone other than the patient: yes  Discuss the patient with other providers: yes  Independent visualization of images, tracings, or specimens: yes    Risk of Complications, Morbidity, and/or Mortality  Presenting problems: high  Diagnostic procedures: high  Management options: high    Critical Care  Total time providing critical care: 30-74 minutes (45 mins  minutes of critical care provided. This time excludes other billable procedures. Time does include preparation of documents, medical consultations, review of old records, and direct bedside care. Patient was at high risk for life-threatening deterioration due to sepsis, hypotension, aspiration pna. )    Patient Progress  Patient progress: stable      Final diagnoses:   Aspiration pneumonia of right upper lobe,  unspecified aspiration pneumonia type (CMS/[facility])   Pneumonia of both lungs due to infectious organism, unspecified part of lung   Sepsis, due to unspecified organism, unspecified whether acute organ dysfunction present (CMS/[facility])   Hypotension, unspecified hypotension type   Constipation, unspecified constipation type   Elevated Dilantin level            Mary Kay Abbasi,   11/03/20 1702       Mary Kay Abbasi,   11/03/20 1702

## 2020-11-03 NOTE — ED NOTES
Per micromedex, Flagyl and Azactam are not IV compatible. Will administer Flagyl post Azactam infusion.      Diane Gonzalez RN  11/03/20 9941

## 2020-11-04 LAB
BACTERIA BLD CULT: ABNORMAL
BOTTLE TYPE: ABNORMAL

## 2020-11-05 LAB
BACTERIA SPEC AEROBE CULT: ABNORMAL
GRAM STN SPEC: ABNORMAL
ISOLATED FROM: ABNORMAL